# Patient Record
Sex: MALE | Race: WHITE | NOT HISPANIC OR LATINO | Employment: FULL TIME | URBAN - METROPOLITAN AREA
[De-identification: names, ages, dates, MRNs, and addresses within clinical notes are randomized per-mention and may not be internally consistent; named-entity substitution may affect disease eponyms.]

---

## 2023-05-22 ENCOUNTER — OFFICE VISIT (OUTPATIENT)
Dept: PRIMARY CARE | Facility: CLINIC | Age: 63
End: 2023-05-22
Payer: COMMERCIAL

## 2023-05-22 VITALS
TEMPERATURE: 97.9 F | WEIGHT: 315 LBS | BODY MASS INDEX: 41.75 KG/M2 | HEIGHT: 73 IN | SYSTOLIC BLOOD PRESSURE: 172 MMHG | DIASTOLIC BLOOD PRESSURE: 104 MMHG | HEART RATE: 86 BPM

## 2023-05-22 DIAGNOSIS — Z00.00 HEALTHCARE MAINTENANCE: ICD-10-CM

## 2023-05-22 DIAGNOSIS — B35.6 TINEA CRURIS: ICD-10-CM

## 2023-05-22 DIAGNOSIS — E66.01 CLASS 3 SEVERE OBESITY DUE TO EXCESS CALORIES WITH SERIOUS COMORBIDITY AND BODY MASS INDEX (BMI) OF 50.0 TO 59.9 IN ADULT (MULTI): ICD-10-CM

## 2023-05-22 DIAGNOSIS — L98.9 LESION OF LOWER EXTREMITY: ICD-10-CM

## 2023-05-22 DIAGNOSIS — E11.9 DIET-CONTROLLED DIABETES MELLITUS (MULTI): ICD-10-CM

## 2023-05-22 DIAGNOSIS — I10 PRIMARY HYPERTENSION: Primary | ICD-10-CM

## 2023-05-22 DIAGNOSIS — I83.002: ICD-10-CM

## 2023-05-22 DIAGNOSIS — G89.29 CHRONIC BILATERAL LOW BACK PAIN WITHOUT SCIATICA: ICD-10-CM

## 2023-05-22 DIAGNOSIS — L97.209: ICD-10-CM

## 2023-05-22 DIAGNOSIS — I48.11 LONGSTANDING PERSISTENT ATRIAL FIBRILLATION (MULTI): ICD-10-CM

## 2023-05-22 DIAGNOSIS — Z86.39 HISTORY OF HYPERTHYROIDISM: ICD-10-CM

## 2023-05-22 DIAGNOSIS — D50.0 ANEMIA DUE TO BLOOD LOSS: ICD-10-CM

## 2023-05-22 DIAGNOSIS — I89.0 LYMPHEDEMA: ICD-10-CM

## 2023-05-22 DIAGNOSIS — I87.2 CHRONIC VENOUS STASIS DERMATITIS OF BOTH LOWER EXTREMITIES: ICD-10-CM

## 2023-05-22 DIAGNOSIS — M54.50 CHRONIC BILATERAL LOW BACK PAIN WITHOUT SCIATICA: ICD-10-CM

## 2023-05-22 DIAGNOSIS — R53.83 OTHER FATIGUE: ICD-10-CM

## 2023-05-22 PROBLEM — I83.009 ULCER, VENOUS STASIS (MULTI): Status: ACTIVE | Noted: 2023-05-22

## 2023-05-22 PROBLEM — L97.909 ULCER, VENOUS STASIS (MULTI): Status: ACTIVE | Noted: 2023-05-22

## 2023-05-22 PROBLEM — B35.1 ONYCHOMYCOSIS OF TOENAIL: Status: ACTIVE | Noted: 2023-05-22

## 2023-05-22 PROBLEM — I48.91 ATRIAL FIBRILLATION (MULTI): Status: ACTIVE | Noted: 2023-05-22

## 2023-05-22 PROBLEM — E66.813 CLASS 3 SEVERE OBESITY DUE TO EXCESS CALORIES WITH SERIOUS COMORBIDITY AND BODY MASS INDEX (BMI) OF 50.0 TO 59.9 IN ADULT: Status: ACTIVE | Noted: 2023-05-22

## 2023-05-22 PROCEDURE — 4010F ACE/ARB THERAPY RXD/TAKEN: CPT | Performed by: FAMILY MEDICINE

## 2023-05-22 PROCEDURE — 99215 OFFICE O/P EST HI 40 MIN: CPT | Performed by: FAMILY MEDICINE

## 2023-05-22 PROCEDURE — 3080F DIAST BP >= 90 MM HG: CPT | Performed by: FAMILY MEDICINE

## 2023-05-22 PROCEDURE — 1036F TOBACCO NON-USER: CPT | Performed by: FAMILY MEDICINE

## 2023-05-22 PROCEDURE — 3077F SYST BP >= 140 MM HG: CPT | Performed by: FAMILY MEDICINE

## 2023-05-22 PROCEDURE — 3008F BODY MASS INDEX DOCD: CPT | Performed by: FAMILY MEDICINE

## 2023-05-22 PROCEDURE — 3044F HG A1C LEVEL LT 7.0%: CPT | Performed by: FAMILY MEDICINE

## 2023-05-22 RX ORDER — CHOLECALCIFEROL (VITAMIN D3) 50 MCG
1 TABLET ORAL DAILY
COMMUNITY
Start: 2019-10-04

## 2023-05-22 RX ORDER — NYSTATIN 100000 U/G
CREAM TOPICAL 2 TIMES DAILY
Qty: 60 G | Refills: 3 | Status: SHIPPED | OUTPATIENT
Start: 2023-05-22 | End: 2023-06-05

## 2023-05-22 RX ORDER — FLUCONAZOLE 100 MG/1
100 TABLET ORAL DAILY
Qty: 10 TABLET | Refills: 0 | Status: SHIPPED | OUTPATIENT
Start: 2023-05-22 | End: 2023-06-01

## 2023-05-22 RX ORDER — NYSTATIN 100000 [USP'U]/G
POWDER TOPICAL 2 TIMES DAILY
Qty: 60 G | Refills: 2 | Status: SHIPPED | OUTPATIENT
Start: 2023-05-22 | End: 2024-05-21

## 2023-05-22 RX ORDER — LISINOPRIL 20 MG/1
20 TABLET ORAL DAILY
COMMUNITY
End: 2023-05-22 | Stop reason: DRUGHIGH

## 2023-05-22 RX ORDER — FOLIC ACID 0.8 MG
1 TABLET ORAL DAILY
COMMUNITY
Start: 2019-10-04

## 2023-05-22 RX ORDER — LISINOPRIL 20 MG/1
30 TABLET ORAL DAILY
Qty: 45 TABLET | Refills: 0 | Status: SHIPPED | OUTPATIENT
Start: 2023-05-22 | End: 2023-07-17 | Stop reason: ALTCHOICE

## 2023-05-22 RX ORDER — ZINC GLUCONATE 100 MG
1 TABLET ORAL DAILY
Refills: 0
Start: 2023-05-22

## 2023-05-22 RX ORDER — IBUPROFEN 100 MG/5ML
1 SUSPENSION, ORAL (FINAL DOSE FORM) ORAL DAILY
COMMUNITY
Start: 2019-10-04

## 2023-05-22 RX ORDER — CALCIUM CARBONATE 300MG(750)
1 TABLET,CHEWABLE ORAL DAILY
COMMUNITY
Start: 2019-10-04

## 2023-05-22 RX ORDER — IODINE
CRYSTALS MISCELLANEOUS
COMMUNITY
Start: 2019-10-04

## 2023-05-22 RX ORDER — PREDNISONE 20 MG/1
TABLET ORAL
Qty: 18 TABLET | Refills: 0 | Status: ON HOLD | OUTPATIENT
Start: 2023-05-22 | End: 2023-10-13 | Stop reason: ALTCHOICE

## 2023-05-22 RX ORDER — SELENIUM 200 MCG
1 TABLET ORAL DAILY
COMMUNITY
Start: 2019-10-04

## 2023-05-22 RX ORDER — WARFARIN 1 MG/1
0.5 TABLET ORAL DAILY
COMMUNITY
Start: 2016-12-05 | End: 2023-12-07

## 2023-05-22 RX ORDER — VITAMIN E 268 MG
CAPSULE ORAL
COMMUNITY

## 2023-05-22 ASSESSMENT — ENCOUNTER SYMPTOMS
FATIGUE: 1
SHORTNESS OF BREATH: 1
WOUND: 1

## 2023-05-22 NOTE — PROGRESS NOTES
Subjective   Patient ID: Hugo Gauthier is a 62 y.o. male.    HPI  62 year old male for follow up, lesion on scalp for a week or two, not sure  Feels very tired  Meds unchanged  In wound care, ordered ultrasound  Review of Systems   Constitutional:  Positive for fatigue.   Respiratory:  Positive for shortness of breath.    Cardiovascular:  Positive for leg swelling.   Skin:  Positive for wound.       Objective   Physical Exam  Vitals reviewed.   Constitutional:       Appearance: Normal appearance.   HENT:      Head: Normocephalic and atraumatic.      Right Ear: Tympanic membrane normal.      Left Ear: Tympanic membrane normal.      Nose: Nose normal.      Mouth/Throat:      Mouth: Mucous membranes are moist.      Pharynx: Oropharynx is clear.   Eyes:      Extraocular Movements: Extraocular movements intact.      Conjunctiva/sclera: Conjunctivae normal.      Pupils: Pupils are equal, round, and reactive to light.   Cardiovascular:      Rate and Rhythm: Normal rate. Rhythm irregular.      Pulses: Normal pulses.      Heart sounds: Normal heart sounds.   Pulmonary:      Effort: Pulmonary effort is normal.      Breath sounds: Normal breath sounds.   Abdominal:      General: Abdomen is flat. Bowel sounds are normal.      Palpations: Abdomen is soft.   Musculoskeletal:         General: Normal range of motion.      Cervical back: Normal range of motion and neck supple.   Skin:     General: Skin is warm and dry.      Capillary Refill: Capillary refill takes less than 2 seconds.      Findings: Lesion present.      Comments: SCALP LESION   Neurological:      General: No focal deficit present.      Mental Status: He is alert and oriented to person, place, and time.   Psychiatric:         Mood and Affect: Mood normal.         Behavior: Behavior normal.         Assessment/Plan   Diagnoses and all orders for this visit:  Primary hypertension  Healthcare maintenance  -     zinc gluconate 100 mg tablet; Take 1 tablet (100 mg) by  mouth once daily.  Longstanding persistent atrial fibrillation (CMS/[facility])  -     POCT INR manually resulted  Chronic venous stasis dermatitis of both lower extremities  Lesion of lower extremity  Anemia due to blood loss  -     CBC and Auto Differential; Future  History of hyperthyroidism  -     Comprehensive Metabolic Panel; Future  Lymphedema  Venous stasis ulcer of calf with varicose veins, unspecified laterality, unspecified ulcer stage (CMS/[facility])  Class 3 severe obesity due to excess calories with serious comorbidity and body mass index (BMI) of 50.0 to 59.9 in adult (CMS/[facility])  Diet-controlled diabetes mellitus (CMS/[facility])  -     Hemoglobin A1C; Future  Other fatigue  -     Testosterone; Future  Chronic bilateral low back pain without sciatica  Tinea cruris  -     fluconazole (Diflucan) 100 mg tablet; Take 1 tablet (100 mg) by mouth once daily for 10 days.  -     nystatin (Mycostatin) 100,000 unit/gram powder; Apply topically 2 times a day.  -     nystatin (Mycostatin) cream; Apply topically 2 times a day for 14 days. apply to affected area  -     predniSONE (Deltasone) 20 mg tablet; Take 3 tabs (60mg) daily for 3 days, then take 2 tabs (40mg) daily for 3 days, then take 1 tab (20mg) daily for 3 days.  Other orders  -     lisinopril 20 mg tablet; Take 1.5 tablets (30 mg) by mouth once daily.

## 2023-05-22 NOTE — PATIENT INSTRUCTIONS
Diagnoses and all orders for this visit:  Primary hypertension  Healthcare maintenance  -     zinc gluconate 100 mg tablet; Take 1 tablet (100 mg) by mouth once daily.  Longstanding persistent atrial fibrillation (CMS/HCC)  -     POCT INR manually resulted  Chronic venous stasis dermatitis of both lower extremities  Lesion of lower extremity  Anemia due to blood loss  -     CBC and Auto Differential; Future  History of hyperthyroidism  -     Comprehensive Metabolic Panel; Future  Lymphedema  Venous stasis ulcer of calf with varicose veins, unspecified laterality, unspecified ulcer stage (CMS/[facility])  Class 3 severe obesity due to excess calories with serious comorbidity and body mass index (BMI) of 50.0 to 59.9 in adult (CMS/[facility])- stable.   Diet-controlled diabetes mellitus (CMS/[facility])  -     Hemoglobin A1C; Future (last 6.0)   Other fatigue  -     Testosterone; Future with next bloodwork  Chronic bilateral low back pain without sciatica- may have to go back to [facility] at some point.   Other orders  -     lisinopril 20 mg tablet; Take 1.5 tablets (30 mg) by mouth once daily.    Tinea cruris- will try oral steroid (which will also help scalp lesion) , diflucan, nystatin creams and powders.     Follow up six months. Blood work this summer. Recheck blood pressure with next INR.

## 2023-06-27 LAB
ALANINE AMINOTRANSFERASE (SGPT) (U/L) IN SER/PLAS: 31 U/L (ref 10–52)
ALBUMIN (G/DL) IN SER/PLAS: 4 G/DL (ref 3.4–5)
ALKALINE PHOSPHATASE (U/L) IN SER/PLAS: 44 U/L (ref 33–136)
ANION GAP IN SER/PLAS: 10 MMOL/L (ref 10–20)
ASPARTATE AMINOTRANSFERASE (SGOT) (U/L) IN SER/PLAS: 23 U/L (ref 9–39)
BILIRUBIN TOTAL (MG/DL) IN SER/PLAS: 0.5 MG/DL (ref 0–1.2)
CALCIUM (MG/DL) IN SER/PLAS: 9.3 MG/DL (ref 8.6–10.3)
CARBON DIOXIDE, TOTAL (MMOL/L) IN SER/PLAS: 29 MMOL/L (ref 21–32)
CHLORIDE (MMOL/L) IN SER/PLAS: 103 MMOL/L (ref 98–107)
CREATININE (MG/DL) IN SER/PLAS: 0.76 MG/DL (ref 0.5–1.3)
ERYTHROCYTE DISTRIBUTION WIDTH (RATIO) BY AUTOMATED COUNT: 18.5 % (ref 11.5–14.5)
ERYTHROCYTE MEAN CORPUSCULAR HEMOGLOBIN CONCENTRATION (G/DL) BY AUTOMATED: 30 G/DL (ref 32–36)
ERYTHROCYTE MEAN CORPUSCULAR VOLUME (FL) BY AUTOMATED COUNT: 92 FL (ref 80–100)
ERYTHROCYTES (10*6/UL) IN BLOOD BY AUTOMATED COUNT: 4.51 X10E12/L (ref 4.5–5.9)
GFR MALE: >90 ML/MIN/1.73M2
GLUCOSE (MG/DL) IN SER/PLAS: 124 MG/DL (ref 74–99)
HEMATOCRIT (%) IN BLOOD BY AUTOMATED COUNT: 41.7 % (ref 41–52)
HEMOGLOBIN (G/DL) IN BLOOD: 12.5 G/DL (ref 13.5–17.5)
LEUKOCYTES (10*3/UL) IN BLOOD BY AUTOMATED COUNT: 5.1 X10E9/L (ref 4.4–11.3)
PLATELETS (10*3/UL) IN BLOOD AUTOMATED COUNT: 219 X10E9/L (ref 150–450)
POTASSIUM (MMOL/L) IN SER/PLAS: 4.1 MMOL/L (ref 3.5–5.3)
PROTEIN TOTAL: 7.3 G/DL (ref 6.4–8.2)
SODIUM (MMOL/L) IN SER/PLAS: 138 MMOL/L (ref 136–145)
UREA NITROGEN (MG/DL) IN SER/PLAS: 12 MG/DL (ref 6–23)

## 2023-07-17 ENCOUNTER — CLINICAL SUPPORT (OUTPATIENT)
Dept: PRIMARY CARE | Facility: CLINIC | Age: 63
End: 2023-07-17
Payer: COMMERCIAL

## 2023-07-17 ENCOUNTER — TELEPHONE (OUTPATIENT)
Dept: PRIMARY CARE | Facility: CLINIC | Age: 63
End: 2023-07-17

## 2023-07-17 DIAGNOSIS — I10 PRIMARY HYPERTENSION: Primary | ICD-10-CM

## 2023-07-17 DIAGNOSIS — I48.11 LONGSTANDING PERSISTENT ATRIAL FIBRILLATION (MULTI): ICD-10-CM

## 2023-07-17 LAB — POC INR: 1.5 (ref 0.9–1.1)

## 2023-07-17 PROCEDURE — 85610 PROTHROMBIN TIME: CPT | Performed by: FAMILY MEDICINE

## 2023-07-17 RX ORDER — DILTIAZEM HYDROCHLORIDE 60 MG/1
60 TABLET, FILM COATED ORAL DAILY
Qty: 90 TABLET | Refills: 3 | Status: SHIPPED | OUTPATIENT
Start: 2023-07-17 | End: 2024-01-22 | Stop reason: WASHOUT

## 2023-07-17 RX ORDER — LISINOPRIL 30 MG/1
30 TABLET ORAL DAILY
Qty: 90 TABLET | Refills: 3 | Status: SHIPPED | OUTPATIENT
Start: 2023-07-17 | End: 2023-09-27

## 2023-07-17 RX ORDER — DILTIAZEM HYDROCHLORIDE 60 MG/1
60 TABLET, FILM COATED ORAL DAILY
Qty: 90 TABLET | Refills: 3 | Status: SHIPPED | OUTPATIENT
Start: 2023-07-17 | End: 2023-07-17 | Stop reason: SDUPTHER

## 2023-07-17 RX ORDER — DILTIAZEM HYDROCHLORIDE 60 MG/1
60 TABLET, FILM COATED ORAL DAILY
COMMUNITY
End: 2023-07-17 | Stop reason: SDUPTHER

## 2023-07-17 RX ORDER — LISINOPRIL 30 MG/1
30 TABLET ORAL DAILY
Qty: 90 TABLET | Refills: 3 | Status: SHIPPED | OUTPATIENT
Start: 2023-07-17 | End: 2023-07-17 | Stop reason: SDUPTHER

## 2023-07-17 NOTE — TELEPHONE ENCOUNTER
Patient needs refills on Warfairin 1mg -Family           Needs 2 medications sent to [facility]     Lisinopril 30mg or 20mg 1.5    Diltiazem 60mg

## 2023-07-17 NOTE — PROGRESS NOTES
Patient presented today for INR check. INR was 1.5  patient will take 4.5mg daily.  Repeat X1 month.    Leidy Charles MA II

## 2023-08-01 LAB
ALBUMIN (G/DL) IN SER/PLAS: 4.1 G/DL (ref 3.4–5)
ANION GAP IN SER/PLAS: 11 MMOL/L (ref 10–20)
CALCIUM (MG/DL) IN SER/PLAS: 9.3 MG/DL (ref 8.6–10.3)
CARBON DIOXIDE, TOTAL (MMOL/L) IN SER/PLAS: 29 MMOL/L (ref 21–32)
CHLORIDE (MMOL/L) IN SER/PLAS: 104 MMOL/L (ref 98–107)
CREATININE (MG/DL) IN SER/PLAS: 0.86 MG/DL (ref 0.5–1.3)
GFR MALE: >90 ML/MIN/1.73M2
GLUCOSE (MG/DL) IN SER/PLAS: 99 MG/DL (ref 74–99)
PHOSPHATE (MG/DL) IN SER/PLAS: 3.5 MG/DL (ref 2.5–4.9)
POTASSIUM (MMOL/L) IN SER/PLAS: 4.3 MMOL/L (ref 3.5–5.3)
SODIUM (MMOL/L) IN SER/PLAS: 140 MMOL/L (ref 136–145)
UREA NITROGEN (MG/DL) IN SER/PLAS: 14 MG/DL (ref 6–23)

## 2023-08-21 ENCOUNTER — LAB (OUTPATIENT)
Dept: LAB | Facility: LAB | Age: 63
End: 2023-08-21
Payer: COMMERCIAL

## 2023-08-21 DIAGNOSIS — R53.83 OTHER FATIGUE: ICD-10-CM

## 2023-08-21 DIAGNOSIS — E11.9 DIET-CONTROLLED DIABETES MELLITUS (MULTI): ICD-10-CM

## 2023-08-21 DIAGNOSIS — D50.0 ANEMIA DUE TO BLOOD LOSS: ICD-10-CM

## 2023-08-21 DIAGNOSIS — Z86.39 HISTORY OF HYPERTHYROIDISM: ICD-10-CM

## 2023-08-21 LAB
ALANINE AMINOTRANSFERASE (SGPT) (U/L) IN SER/PLAS: 28 U/L (ref 10–52)
ALBUMIN (G/DL) IN SER/PLAS: 4 G/DL (ref 3.4–5)
ALKALINE PHOSPHATASE (U/L) IN SER/PLAS: 43 U/L (ref 33–136)
ANION GAP IN SER/PLAS: 12 MMOL/L (ref 10–20)
ASPARTATE AMINOTRANSFERASE (SGOT) (U/L) IN SER/PLAS: 26 U/L (ref 9–39)
BASOPHILS (10*3/UL) IN BLOOD BY AUTOMATED COUNT: 0.04 X10E9/L (ref 0–0.1)
BASOPHILS/100 LEUKOCYTES IN BLOOD BY AUTOMATED COUNT: 0.6 % (ref 0–2)
BILIRUBIN TOTAL (MG/DL) IN SER/PLAS: 0.5 MG/DL (ref 0–1.2)
CALCIUM (MG/DL) IN SER/PLAS: 9.3 MG/DL (ref 8.6–10.3)
CARBON DIOXIDE, TOTAL (MMOL/L) IN SER/PLAS: 27 MMOL/L (ref 21–32)
CHLORIDE (MMOL/L) IN SER/PLAS: 104 MMOL/L (ref 98–107)
CREATININE (MG/DL) IN SER/PLAS: 0.75 MG/DL (ref 0.5–1.3)
EOSINOPHILS (10*3/UL) IN BLOOD BY AUTOMATED COUNT: 0.26 X10E9/L (ref 0–0.7)
EOSINOPHILS/100 LEUKOCYTES IN BLOOD BY AUTOMATED COUNT: 4.2 % (ref 0–6)
ERYTHROCYTE DISTRIBUTION WIDTH (RATIO) BY AUTOMATED COUNT: 17.2 % (ref 11.5–14.5)
ERYTHROCYTE MEAN CORPUSCULAR HEMOGLOBIN CONCENTRATION (G/DL) BY AUTOMATED: 29.6 G/DL (ref 32–36)
ERYTHROCYTE MEAN CORPUSCULAR VOLUME (FL) BY AUTOMATED COUNT: 94 FL (ref 80–100)
ERYTHROCYTES (10*6/UL) IN BLOOD BY AUTOMATED COUNT: 4.42 X10E12/L (ref 4.5–5.9)
ESTIMATED AVERAGE GLUCOSE FOR HBA1C: 131 MG/DL
GFR MALE: >90 ML/MIN/1.73M2
GLUCOSE (MG/DL) IN SER/PLAS: 116 MG/DL (ref 74–99)
HEMATOCRIT (%) IN BLOOD BY AUTOMATED COUNT: 41.5 % (ref 41–52)
HEMOGLOBIN (G/DL) IN BLOOD: 12.3 G/DL (ref 13.5–17.5)
HEMOGLOBIN A1C/HEMOGLOBIN TOTAL IN BLOOD: 6.2 %
IMMATURE GRANULOCYTES/100 LEUKOCYTES IN BLOOD BY AUTOMATED COUNT: 0.2 % (ref 0–0.9)
LEUKOCYTES (10*3/UL) IN BLOOD BY AUTOMATED COUNT: 6.2 X10E9/L (ref 4.4–11.3)
LYMPHOCYTES (10*3/UL) IN BLOOD BY AUTOMATED COUNT: 1.35 X10E9/L (ref 1.2–4.8)
LYMPHOCYTES/100 LEUKOCYTES IN BLOOD BY AUTOMATED COUNT: 21.8 % (ref 13–44)
MONOCYTES (10*3/UL) IN BLOOD BY AUTOMATED COUNT: 0.73 X10E9/L (ref 0.1–1)
MONOCYTES/100 LEUKOCYTES IN BLOOD BY AUTOMATED COUNT: 11.8 % (ref 2–10)
NEUTROPHILS (10*3/UL) IN BLOOD BY AUTOMATED COUNT: 3.79 X10E9/L (ref 1.2–7.7)
NEUTROPHILS/100 LEUKOCYTES IN BLOOD BY AUTOMATED COUNT: 61.4 % (ref 40–80)
PLATELETS (10*3/UL) IN BLOOD AUTOMATED COUNT: 217 X10E9/L (ref 150–450)
POTASSIUM (MMOL/L) IN SER/PLAS: 4.6 MMOL/L (ref 3.5–5.3)
PROTEIN TOTAL: 6.9 G/DL (ref 6.4–8.2)
SODIUM (MMOL/L) IN SER/PLAS: 138 MMOL/L (ref 136–145)
TESTOSTERONE (NG/DL) IN SER/PLAS: 96 NG/DL (ref 240–1000)
UREA NITROGEN (MG/DL) IN SER/PLAS: 16 MG/DL (ref 6–23)

## 2023-08-21 PROCEDURE — 80053 COMPREHEN METABOLIC PANEL: CPT

## 2023-08-21 PROCEDURE — 36415 COLL VENOUS BLD VENIPUNCTURE: CPT

## 2023-08-21 PROCEDURE — 83036 HEMOGLOBIN GLYCOSYLATED A1C: CPT

## 2023-08-21 PROCEDURE — 84403 ASSAY OF TOTAL TESTOSTERONE: CPT

## 2023-08-21 PROCEDURE — 85025 COMPLETE CBC W/AUTO DIFF WBC: CPT

## 2023-08-29 ENCOUNTER — TELEPHONE (OUTPATIENT)
Dept: PRIMARY CARE | Facility: CLINIC | Age: 63
End: 2023-08-29
Payer: COMMERCIAL

## 2023-08-29 DIAGNOSIS — M54.50 CHRONIC BILATERAL LOW BACK PAIN WITHOUT SCIATICA: Primary | ICD-10-CM

## 2023-08-29 DIAGNOSIS — G89.29 CHRONIC BILATERAL LOW BACK PAIN WITHOUT SCIATICA: Primary | ICD-10-CM

## 2023-08-29 DIAGNOSIS — M47.817 SPONDYLOSIS OF LUMBOSACRAL REGION WITHOUT MYELOPATHY OR RADICULOPATHY: ICD-10-CM

## 2023-08-29 PROBLEM — E29.1 HYPOGONADISM MALE: Status: ACTIVE | Noted: 2023-08-29

## 2023-08-29 NOTE — RESULT ENCOUNTER NOTE
Labs looked pretty good except A1C was 6.2 (still pretty good) Otherwise not too anemic, and testosterone was VERY low- you would be a candidate for supplementation- either with shots or patches or gel. Check you insurance to see what is on your formulary.

## 2023-08-29 NOTE — TELEPHONE ENCOUNTER
Would like to know if you can help him with FMLA papers and short term disability paperwork   Needs referral for lower back pain, Would like DR. Landers with [facility] if he is in network  Also needs a second blood test for testosterone

## 2023-09-05 ENCOUNTER — LAB (OUTPATIENT)
Dept: LAB | Facility: LAB | Age: 63
End: 2023-09-05
Payer: COMMERCIAL

## 2023-09-05 ENCOUNTER — OFFICE VISIT (OUTPATIENT)
Dept: PRIMARY CARE | Facility: CLINIC | Age: 63
End: 2023-09-05
Payer: COMMERCIAL

## 2023-09-05 VITALS
WEIGHT: 315 LBS | HEIGHT: 71 IN | BODY MASS INDEX: 44.1 KG/M2 | DIASTOLIC BLOOD PRESSURE: 84 MMHG | HEART RATE: 80 BPM | SYSTOLIC BLOOD PRESSURE: 152 MMHG | TEMPERATURE: 97.8 F | RESPIRATION RATE: 18 BRPM | OXYGEN SATURATION: 95 %

## 2023-09-05 DIAGNOSIS — M54.50 CHRONIC BILATERAL LOW BACK PAIN WITHOUT SCIATICA: ICD-10-CM

## 2023-09-05 DIAGNOSIS — I48.11 LONGSTANDING PERSISTENT ATRIAL FIBRILLATION (MULTI): ICD-10-CM

## 2023-09-05 DIAGNOSIS — G89.29 CHRONIC BILATERAL LOW BACK PAIN WITHOUT SCIATICA: ICD-10-CM

## 2023-09-05 DIAGNOSIS — L57.0 ACTINIC KERATOSES: Primary | ICD-10-CM

## 2023-09-05 DIAGNOSIS — M47.817 SPONDYLOSIS OF LUMBOSACRAL REGION WITHOUT MYELOPATHY OR RADICULOPATHY: ICD-10-CM

## 2023-09-05 LAB
POC INR: 4 (ref 0.9–1.1)
TESTOSTERONE (NG/DL) IN SER/PLAS: 75 NG/DL (ref 240–1000)

## 2023-09-05 PROCEDURE — 3079F DIAST BP 80-89 MM HG: CPT | Performed by: FAMILY MEDICINE

## 2023-09-05 PROCEDURE — 3008F BODY MASS INDEX DOCD: CPT | Performed by: FAMILY MEDICINE

## 2023-09-05 PROCEDURE — 99215 OFFICE O/P EST HI 40 MIN: CPT | Performed by: FAMILY MEDICINE

## 2023-09-05 PROCEDURE — 1036F TOBACCO NON-USER: CPT | Performed by: FAMILY MEDICINE

## 2023-09-05 PROCEDURE — 84403 ASSAY OF TOTAL TESTOSTERONE: CPT

## 2023-09-05 PROCEDURE — 85610 PROTHROMBIN TIME: CPT | Performed by: FAMILY MEDICINE

## 2023-09-05 PROCEDURE — 4010F ACE/ARB THERAPY RXD/TAKEN: CPT | Performed by: FAMILY MEDICINE

## 2023-09-05 PROCEDURE — 3044F HG A1C LEVEL LT 7.0%: CPT | Performed by: FAMILY MEDICINE

## 2023-09-05 PROCEDURE — 17110 DESTRUCTION B9 LES UP TO 14: CPT | Performed by: FAMILY MEDICINE

## 2023-09-05 PROCEDURE — 3077F SYST BP >= 140 MM HG: CPT | Performed by: FAMILY MEDICINE

## 2023-09-05 PROCEDURE — 36415 COLL VENOUS BLD VENIPUNCTURE: CPT

## 2023-09-05 ASSESSMENT — ENCOUNTER SYMPTOMS
SHORTNESS OF BREATH: 1
ROS SKIN COMMENTS: SEE HPI
FATIGUE: 1
PALPITATIONS: 0

## 2023-09-05 ASSESSMENT — PATIENT HEALTH QUESTIONNAIRE - PHQ9
SUM OF ALL RESPONSES TO PHQ9 QUESTIONS 1 AND 2: 0
1. LITTLE INTEREST OR PLEASURE IN DOING THINGS: NOT AT ALL
2. FEELING DOWN, DEPRESSED OR HOPELESS: NOT AT ALL

## 2023-09-05 ASSESSMENT — LIFESTYLE VARIABLES
SKIP TO QUESTIONS 9-10: 1
AUDIT-C TOTAL SCORE: 0
HOW OFTEN DO YOU HAVE SIX OR MORE DRINKS ON ONE OCCASION: NEVER
HOW MANY STANDARD DRINKS CONTAINING ALCOHOL DO YOU HAVE ON A TYPICAL DAY: PATIENT DOES NOT DRINK
HOW OFTEN DO YOU HAVE A DRINK CONTAINING ALCOHOL: NEVER

## 2023-09-05 NOTE — PROGRESS NOTES
"Subjective   Patient ID: Hugo Gauthier is a 62 y.o. male who presents for paperwork (FMLA).  Patient ID: Hugo Gauthier is a 62 y.o. male.    Destruction of lesion    Date/Time: 9/5/2023 12:48 PM    Performed by: SHERLEY Fox  Authorized by: SHERLEY Fox    Number of Lesions: 1  Lesion 1:     Body area: upper extremity    Upper extremity location: left shoulder.    Malignancy: benign lesion      Destruction method: cryotherapy        Presents for FMLA paperwork has been off work since 8/29 due to multiple health issues and is currently under the care of several specialists and is undergoing testing for potential surgery, due to this it is difficult for him to attend work, would benefit from a continuous period off to be able to attend appointments and testing, is following with vascular for peripheral vascular disease, cardiology, for shortness of breath and fatigue with pending cardiovascular testing and infectious disease for leg wounds.     Wound of left leg for 2 years, following with wound care for this monthly and was a recommended to see the podiatrist     Also wishes to have skin growth removed today               Review of Systems   Constitutional:  Positive for fatigue.   Respiratory:  Positive for shortness of breath.    Cardiovascular:  Negative for chest pain and palpitations.   Skin:         See HPI   All other systems reviewed and are negative.      Objective   /84   Pulse 80   Temp 36.6 °C (97.8 °F) (Temporal)   Resp 18   Ht 1.803 m (5' 11\")   Wt (!) 169 kg (371 lb 12.8 oz)   SpO2 95%   BMI 51.86 kg/m²     Physical Exam  Constitutional:       Appearance: Normal appearance.   HENT:      Head: Normocephalic and atraumatic.   Cardiovascular:      Rate and Rhythm: Normal rate and regular rhythm.      Heart sounds: No murmur heard.     No gallop.   Pulmonary:      Effort: Pulmonary effort is normal. No respiratory distress.      Breath sounds: Normal " breath sounds.   Abdominal:      General: Bowel sounds are normal. There is no distension.      Tenderness: There is no abdominal tenderness.   Musculoskeletal:         General: Normal range of motion.   Skin:     General: Skin is warm and dry.      Findings: No lesion or rash.      Comments: Left shoulder with 4mm in diameter actinic keratosis    Neurological:      General: No focal deficit present.      Mental Status: He is alert and oriented to person, place, and time. Mental status is at baseline.   Psychiatric:         Mood and Affect: Mood normal.         Behavior: Behavior normal.         Assessment/Plan   Problem List Items Addressed This Visit    None  Visit Diagnoses       Actinic keratoses    -  Primary    Relevant Orders    Cryotherapy, skin lesion

## 2023-09-11 ENCOUNTER — TELEPHONE (OUTPATIENT)
Dept: PRIMARY CARE | Facility: CLINIC | Age: 63
End: 2023-09-11
Payer: COMMERCIAL

## 2023-09-11 NOTE — TELEPHONE ENCOUNTER
Testosterone shots  [facility]  [facility]   [phone]  Fax [phone]

## 2023-09-12 ENCOUNTER — TELEPHONE (OUTPATIENT)
Dept: PRIMARY CARE | Facility: CLINIC | Age: 63
End: 2023-09-12
Payer: COMMERCIAL

## 2023-09-12 DIAGNOSIS — E29.1 HYPOGONADISM MALE: Primary | ICD-10-CM

## 2023-09-12 RX ORDER — TESTOSTERONE CYPIONATE 1000 MG/10ML
100 INJECTION, SOLUTION INTRAMUSCULAR
Qty: 2 ML | Refills: 5 | Status: SHIPPED | OUTPATIENT
Start: 2023-09-12 | End: 2023-09-13 | Stop reason: SDUPTHER

## 2023-09-12 RX ORDER — SYRINGE WITH NEEDLE, 1 ML 25GX5/8"
1 SYRINGE, EMPTY DISPOSABLE MISCELLANEOUS
Qty: 2 EACH | Refills: 5 | Status: SHIPPED | OUTPATIENT
Start: 2023-09-12 | End: 2024-03-06

## 2023-09-13 DIAGNOSIS — E29.1 HYPOGONADISM MALE: ICD-10-CM

## 2023-09-13 RX ORDER — TESTOSTERONE CYPIONATE 1000 MG/10ML
200 INJECTION, SOLUTION INTRAMUSCULAR
Qty: 2 ML | Refills: 0 | Status: SHIPPED | OUTPATIENT
Start: 2023-09-13 | End: 2023-09-27 | Stop reason: ENTERED-IN-ERROR

## 2023-09-18 ENCOUNTER — TELEPHONE (OUTPATIENT)
Dept: PRIMARY CARE | Facility: CLINIC | Age: 63
End: 2023-09-18
Payer: COMMERCIAL

## 2023-09-18 DIAGNOSIS — I48.11 LONGSTANDING PERSISTENT ATRIAL FIBRILLATION (MULTI): ICD-10-CM

## 2023-09-19 RX ORDER — WARFARIN 4 MG/1
TABLET ORAL
Qty: 30 TABLET | Refills: 0 | Status: SHIPPED | OUTPATIENT
Start: 2023-09-19 | End: 2023-10-24

## 2023-09-21 ENCOUNTER — TELEPHONE (OUTPATIENT)
Dept: PRIMARY CARE | Facility: CLINIC | Age: 63
End: 2023-09-21
Payer: COMMERCIAL

## 2023-09-21 DIAGNOSIS — L97.209: ICD-10-CM

## 2023-09-21 DIAGNOSIS — M54.50 CHRONIC BILATERAL LOW BACK PAIN WITHOUT SCIATICA: ICD-10-CM

## 2023-09-21 DIAGNOSIS — I87.2 CHRONIC VENOUS STASIS DERMATITIS OF BOTH LOWER EXTREMITIES: Primary | ICD-10-CM

## 2023-09-21 DIAGNOSIS — G89.29 CHRONIC BILATERAL LOW BACK PAIN WITHOUT SCIATICA: ICD-10-CM

## 2023-09-21 DIAGNOSIS — I83.002: ICD-10-CM

## 2023-09-27 ENCOUNTER — TELEPHONE (OUTPATIENT)
Dept: PRIMARY CARE | Facility: CLINIC | Age: 63
End: 2023-09-27
Payer: COMMERCIAL

## 2023-09-27 DIAGNOSIS — E29.1 HYPOGONADISM MALE: ICD-10-CM

## 2023-09-27 RX ORDER — TESTOSTERONE CYPIONATE 1000 MG/10ML
200 INJECTION, SOLUTION INTRAMUSCULAR
Qty: 4 ML | Refills: 5 | Status: CANCELLED | OUTPATIENT
Start: 2023-09-27

## 2023-09-27 RX ORDER — LISINOPRIL 40 MG/1
40 TABLET ORAL DAILY
COMMUNITY
Start: 2023-08-21 | End: 2024-02-22 | Stop reason: SDUPTHER

## 2023-09-27 RX ORDER — TESTOSTERONE CYPIONATE 200 MG/ML
200 INJECTION, SOLUTION INTRAMUSCULAR
Qty: 2 ML | Refills: 5 | Status: SHIPPED | OUTPATIENT
Start: 2023-09-27 | End: 2024-03-06

## 2023-09-29 ENCOUNTER — APPOINTMENT (OUTPATIENT)
Dept: PRIMARY CARE | Facility: CLINIC | Age: 63
End: 2023-09-29
Payer: COMMERCIAL

## 2023-10-01 PROBLEM — R40.0 DAYTIME SOMNOLENCE: Status: ACTIVE | Noted: 2023-10-01

## 2023-10-01 PROBLEM — R06.02 SOBOE (SHORTNESS OF BREATH ON EXERTION): Status: ACTIVE | Noted: 2023-10-01

## 2023-10-01 PROBLEM — E66.01 MORBID OBESITY (MULTI): Status: ACTIVE | Noted: 2023-10-01

## 2023-10-01 PROBLEM — R53.82 CHRONIC FATIGUE: Status: ACTIVE | Noted: 2023-10-01

## 2023-10-02 PROBLEM — B35.6 TINEA CRURIS: Status: ACTIVE | Noted: 2023-10-02

## 2023-10-02 PROBLEM — D22.4 NEVUS OF SCALP: Status: ACTIVE | Noted: 2023-10-02

## 2023-10-02 PROBLEM — R31.9 BLOOD IN URINE: Status: ACTIVE | Noted: 2023-10-02

## 2023-10-02 PROBLEM — L85.8 CUTANEOUS HORN: Status: ACTIVE | Noted: 2023-10-02

## 2023-10-02 PROBLEM — L57.0 ACTINIC KERATOSIS: Status: ACTIVE | Noted: 2023-10-02

## 2023-10-02 PROBLEM — Z86.2 HISTORY OF IRON DEFICIENCY ANEMIA: Status: ACTIVE | Noted: 2023-10-02

## 2023-10-02 PROBLEM — Z79.01 ANTICOAGULATED BY ANTICOAGULATION TREATMENT: Status: ACTIVE | Noted: 2023-10-02

## 2023-10-02 PROBLEM — F41.8 TEST ANXIETY: Status: ACTIVE | Noted: 2023-10-02

## 2023-10-02 PROBLEM — L23.7 POISON IVY DERMATITIS: Status: ACTIVE | Noted: 2023-10-02

## 2023-10-02 PROBLEM — I87.1 MAY-THURNER SYNDROME: Status: ACTIVE | Noted: 2023-10-02

## 2023-10-02 PROBLEM — R30.0 DYSURIA: Status: ACTIVE | Noted: 2023-10-02

## 2023-10-02 PROBLEM — R35.0 URINARY FREQUENCY: Status: ACTIVE | Noted: 2023-10-02

## 2023-10-02 PROBLEM — R94.39 ABNORMAL NUCLEAR STRESS TEST: Status: ACTIVE | Noted: 2023-10-02

## 2023-10-02 RX ORDER — KETOCONAZOLE 20 MG/G
CREAM TOPICAL
COMMUNITY
Start: 2023-05-11 | End: 2023-12-07

## 2023-10-02 RX ORDER — KETOCONAZOLE 200 MG/1
TABLET ORAL
Status: ON HOLD | COMMUNITY
Start: 2023-02-27 | End: 2023-10-13 | Stop reason: ALTCHOICE

## 2023-10-02 RX ORDER — CEFDINIR 300 MG/1
300 CAPSULE ORAL DAILY
Status: ON HOLD | COMMUNITY
End: 2023-10-13 | Stop reason: ALTCHOICE

## 2023-10-04 ENCOUNTER — APPOINTMENT (OUTPATIENT)
Dept: CARDIOLOGY | Facility: CLINIC | Age: 63
End: 2023-10-04
Payer: COMMERCIAL

## 2023-10-04 ENCOUNTER — LAB (OUTPATIENT)
Dept: LAB | Facility: LAB | Age: 63
End: 2023-10-04
Payer: COMMERCIAL

## 2023-10-04 DIAGNOSIS — R94.39 ABNORMAL RESULT OF OTHER CARDIOVASCULAR FUNCTION STUDY: ICD-10-CM

## 2023-10-04 DIAGNOSIS — R94.39 ABNORMAL RESULT OF OTHER CARDIOVASCULAR FUNCTION STUDY: Primary | ICD-10-CM

## 2023-10-04 LAB
ANION GAP SERPL CALC-SCNC: 13 MMOL/L (ref 10–20)
BUN SERPL-MCNC: 12 MG/DL (ref 6–23)
CALCIUM SERPL-MCNC: 9.6 MG/DL (ref 8.6–10.3)
CHLORIDE SERPL-SCNC: 102 MMOL/L (ref 98–107)
CO2 SERPL-SCNC: 26 MMOL/L (ref 21–32)
CREAT SERPL-MCNC: 0.76 MG/DL (ref 0.5–1.3)
ERYTHROCYTE [DISTWIDTH] IN BLOOD BY AUTOMATED COUNT: 16.4 % (ref 11.5–14.5)
GFR SERPL CREATININE-BSD FRML MDRD: >90 ML/MIN/1.73M*2
GLUCOSE SERPL-MCNC: 119 MG/DL (ref 74–99)
HCT VFR BLD AUTO: 39.4 % (ref 41–52)
HGB BLD-MCNC: 12.1 G/DL (ref 13.5–17.5)
MCH RBC QN AUTO: 28.4 PG (ref 26–34)
MCHC RBC AUTO-ENTMCNC: 30.7 G/DL (ref 32–36)
MCV RBC AUTO: 93 FL (ref 80–100)
NRBC BLD-RTO: 0 /100 WBCS (ref 0–0)
PLATELET # BLD AUTO: 225 X10*3/UL (ref 150–450)
PMV BLD AUTO: 9.6 FL (ref 7.5–11.5)
POTASSIUM SERPL-SCNC: 4.4 MMOL/L (ref 3.5–5.3)
RBC # BLD AUTO: 4.26 X10*6/UL (ref 4.5–5.9)
SODIUM SERPL-SCNC: 137 MMOL/L (ref 136–145)
WBC # BLD AUTO: 5.6 X10*3/UL (ref 4.4–11.3)

## 2023-10-04 PROCEDURE — 36415 COLL VENOUS BLD VENIPUNCTURE: CPT

## 2023-10-11 NOTE — TELEPHONE ENCOUNTER
10/11/23  1035  Patient called in to report he had his sleep study, labs done for heart cath and informed he has stopped his Warfarin in preparation for heart cath.    Patient to follow up with Dr. Medeiros on 10/20/23

## 2023-10-13 ENCOUNTER — HOSPITAL ENCOUNTER (OUTPATIENT)
Facility: HOSPITAL | Age: 63
Setting detail: OUTPATIENT SURGERY
Discharge: HOME | End: 2023-10-13
Attending: STUDENT IN AN ORGANIZED HEALTH CARE EDUCATION/TRAINING PROGRAM | Admitting: STUDENT IN AN ORGANIZED HEALTH CARE EDUCATION/TRAINING PROGRAM
Payer: COMMERCIAL

## 2023-10-13 VITALS
DIASTOLIC BLOOD PRESSURE: 91 MMHG | OXYGEN SATURATION: 98 % | TEMPERATURE: 97 F | HEART RATE: 95 BPM | SYSTOLIC BLOOD PRESSURE: 177 MMHG | RESPIRATION RATE: 16 BRPM

## 2023-10-13 DIAGNOSIS — I20.9 ANGINA PECTORIS, UNSPECIFIED (CMS-HCC): ICD-10-CM

## 2023-10-13 LAB
POCT INTERNATIONAL NORMALIZATION RATIO: 1.8
POCT PROTHROMBIN TIME: 22 SECONDS

## 2023-10-13 PROCEDURE — 2500000001 HC RX 250 WO HCPCS SELF ADMINISTERED DRUGS (ALT 637 FOR MEDICARE OP): Performed by: STUDENT IN AN ORGANIZED HEALTH CARE EDUCATION/TRAINING PROGRAM

## 2023-10-13 PROCEDURE — 7100000009 HC PHASE TWO TIME - INITIAL BASE CHARGE: Performed by: STUDENT IN AN ORGANIZED HEALTH CARE EDUCATION/TRAINING PROGRAM

## 2023-10-13 PROCEDURE — 2500000005 HC RX 250 GENERAL PHARMACY W/O HCPCS: Performed by: STUDENT IN AN ORGANIZED HEALTH CARE EDUCATION/TRAINING PROGRAM

## 2023-10-13 PROCEDURE — 2500000004 HC RX 250 GENERAL PHARMACY W/ HCPCS (ALT 636 FOR OP/ED): Performed by: STUDENT IN AN ORGANIZED HEALTH CARE EDUCATION/TRAINING PROGRAM

## 2023-10-13 PROCEDURE — 2720000007 HC OR 272 NO HCPCS: Performed by: STUDENT IN AN ORGANIZED HEALTH CARE EDUCATION/TRAINING PROGRAM

## 2023-10-13 PROCEDURE — 7100000010 HC PHASE TWO TIME - EACH INCREMENTAL 1 MINUTE: Performed by: STUDENT IN AN ORGANIZED HEALTH CARE EDUCATION/TRAINING PROGRAM

## 2023-10-13 PROCEDURE — 99221 1ST HOSP IP/OBS SF/LOW 40: CPT | Performed by: NURSE PRACTITIONER

## 2023-10-13 PROCEDURE — 2500000004 HC RX 250 GENERAL PHARMACY W/ HCPCS (ALT 636 FOR OP/ED): Performed by: NURSE PRACTITIONER

## 2023-10-13 PROCEDURE — C1894 INTRO/SHEATH, NON-LASER: HCPCS | Performed by: STUDENT IN AN ORGANIZED HEALTH CARE EDUCATION/TRAINING PROGRAM

## 2023-10-13 PROCEDURE — 93458 L HRT ARTERY/VENTRICLE ANGIO: CPT | Performed by: STUDENT IN AN ORGANIZED HEALTH CARE EDUCATION/TRAINING PROGRAM

## 2023-10-13 PROCEDURE — 99152 MOD SED SAME PHYS/QHP 5/>YRS: CPT | Performed by: STUDENT IN AN ORGANIZED HEALTH CARE EDUCATION/TRAINING PROGRAM

## 2023-10-13 PROCEDURE — 2550000001 HC RX 255 CONTRASTS: Performed by: STUDENT IN AN ORGANIZED HEALTH CARE EDUCATION/TRAINING PROGRAM

## 2023-10-13 RX ORDER — HEPARIN SODIUM 1000 [USP'U]/ML
INJECTION, SOLUTION INTRAVENOUS; SUBCUTANEOUS AS NEEDED
Status: DISCONTINUED | OUTPATIENT
Start: 2023-10-13 | End: 2023-10-13 | Stop reason: HOSPADM

## 2023-10-13 RX ORDER — HYDRALAZINE HYDROCHLORIDE 20 MG/ML
INJECTION INTRAMUSCULAR; INTRAVENOUS AS NEEDED
Status: DISCONTINUED | OUTPATIENT
Start: 2023-10-13 | End: 2023-10-13 | Stop reason: HOSPADM

## 2023-10-13 RX ORDER — FENTANYL CITRATE 50 UG/ML
50 INJECTION, SOLUTION INTRAMUSCULAR; INTRAVENOUS ONCE
Status: COMPLETED | OUTPATIENT
Start: 2023-10-13 | End: 2023-10-13

## 2023-10-13 RX ORDER — MIDAZOLAM HYDROCHLORIDE 1 MG/ML
INJECTION INTRAMUSCULAR; INTRAVENOUS AS NEEDED
Status: DISCONTINUED | OUTPATIENT
Start: 2023-10-13 | End: 2023-10-13 | Stop reason: HOSPADM

## 2023-10-13 RX ORDER — LIDOCAINE HYDROCHLORIDE 20 MG/ML
INJECTION, SOLUTION INFILTRATION; PERINEURAL AS NEEDED
Status: DISCONTINUED | OUTPATIENT
Start: 2023-10-13 | End: 2023-10-13 | Stop reason: HOSPADM

## 2023-10-13 RX ORDER — ASPIRIN 325 MG
TABLET ORAL AS NEEDED
Status: DISCONTINUED | OUTPATIENT
Start: 2023-10-13 | End: 2023-10-13 | Stop reason: HOSPADM

## 2023-10-13 RX ORDER — SODIUM CHLORIDE 9 MG/ML
100 INJECTION, SOLUTION INTRAVENOUS CONTINUOUS
Status: DISCONTINUED | OUTPATIENT
Start: 2023-10-13 | End: 2023-10-13 | Stop reason: HOSPADM

## 2023-10-13 RX ORDER — HYDRALAZINE HYDROCHLORIDE 20 MG/ML
5 INJECTION INTRAMUSCULAR; INTRAVENOUS EVERY 6 HOURS PRN
Status: DISCONTINUED | OUTPATIENT
Start: 2023-10-13 | End: 2023-10-13

## 2023-10-13 RX ORDER — FENTANYL CITRATE 50 UG/ML
INJECTION, SOLUTION INTRAMUSCULAR; INTRAVENOUS AS NEEDED
Status: DISCONTINUED | OUTPATIENT
Start: 2023-10-13 | End: 2023-10-13 | Stop reason: HOSPADM

## 2023-10-13 RX ADMIN — SODIUM CHLORIDE 100 ML/HR: 9 INJECTION, SOLUTION INTRAVENOUS at 07:55

## 2023-10-13 RX ADMIN — FENTANYL CITRATE 50 MCG: 50 INJECTION, SOLUTION INTRAMUSCULAR; INTRAVENOUS at 10:45

## 2023-10-13 RX ADMIN — HYDRALAZINE HYDROCHLORIDE 5 MG: 20 INJECTION INTRAMUSCULAR; INTRAVENOUS at 14:01

## 2023-10-13 RX ADMIN — HYDRALAZINE HYDROCHLORIDE 5 MG: 20 INJECTION INTRAMUSCULAR; INTRAVENOUS at 13:27

## 2023-10-13 ASSESSMENT — ENCOUNTER SYMPTOMS
NEUROLOGICAL NEGATIVE: 1
CARDIOVASCULAR NEGATIVE: 1
CONSTITUTIONAL NEGATIVE: 1
SHORTNESS OF BREATH: 1

## 2023-10-13 ASSESSMENT — PAIN SCALES - GENERAL
PAINLEVEL_OUTOF10: 5 - MODERATE PAIN
PAINLEVEL_OUTOF10: 0 - NO PAIN
PAINLEVEL_OUTOF10: 0 - NO PAIN
PAINLEVEL_OUTOF10: 3
PAINLEVEL_OUTOF10: 0 - NO PAIN
PAINLEVEL_OUTOF10: 3
PAINLEVEL_OUTOF10: 0 - NO PAIN
PAINLEVEL_OUTOF10: 8
PAINLEVEL_OUTOF10: 0 - NO PAIN
PAINLEVEL_OUTOF10: 5 - MODERATE PAIN

## 2023-10-13 ASSESSMENT — COLUMBIA-SUICIDE SEVERITY RATING SCALE - C-SSRS
2. HAVE YOU ACTUALLY HAD ANY THOUGHTS OF KILLING YOURSELF?: NO
1. IN THE PAST MONTH, HAVE YOU WISHED YOU WERE DEAD OR WISHED YOU COULD GO TO SLEEP AND NOT WAKE UP?: NO
6. HAVE YOU EVER DONE ANYTHING, STARTED TO DO ANYTHING, OR PREPARED TO DO ANYTHING TO END YOUR LIFE?: NO

## 2023-10-13 ASSESSMENT — PAIN - FUNCTIONAL ASSESSMENT
PAIN_FUNCTIONAL_ASSESSMENT: 0-10
PAIN_FUNCTIONAL_ASSESSMENT: 0-10

## 2023-10-13 NOTE — H&P
History Of Present Illness  Hugo Gauthier is a 62 y.o. male presenting with shortness of breath on exertion and abnormal nuclear Lexiscan stress testing on 9/25/2023. The stress test revealed, medium-sized area of partially reversible perfusion defect of the apical and inferior and interoseptal segment, consistent with moderate left ventricular systolic dysfunction on post stress gated imaging. LVEF 38%. We will perform LHC to evaluate for ischemia.      Past Medical History  Past Medical History:   Diagnosis Date    Arrhythmia     Clotting disorder (CMS/HCC)     Disease of thyroid gland     Hypertension     Personal history of diseases of the blood and blood-forming organs and certain disorders involving the immune mechanism 10/19/2021    History of iron deficiency anemia    Personal history of diseases of the blood and blood-forming organs and certain disorders involving the immune mechanism 10/19/2021    History of anemia       Surgical History  History reviewed. No pertinent surgical history.     Social History  He reports that he has quit smoking. His smoking use included cigarettes. He has a 20.00 pack-year smoking history. He has never been exposed to tobacco smoke. He has never used smokeless tobacco. He reports that he does not currently use alcohol. He reports that he does not currently use drugs.    Family History  No family history on file.     Allergies  Patient has no known allergies.    Review of Systems   Constitutional: Negative.    Respiratory:  Positive for shortness of breath.    Cardiovascular: Negative.    Neurological: Negative.         Physical Exam  Constitutional:       Appearance: Normal appearance.   Cardiovascular:      Rate and Rhythm: Normal rate and regular rhythm.      Pulses: Normal pulses.      Heart sounds: Normal heart sounds.   Pulmonary:      Effort: Pulmonary effort is normal.      Breath sounds: Normal breath sounds.   Skin:     General: Skin is warm and dry.   Neurological:       General: No focal deficit present.      Mental Status: He is alert and oriented to person, place, and time.          Last Recorded Vitals  Pulse 87, temperature 36.2 °C (97.2 °F), temperature source Tympanic, resp. rate 14, SpO2 100 %.    Relevant Results  Abnormal stress test, see above          Assessment/Plan   Abnormal Stress Test     [facility]             I spent 20 minutes in the professional and overall care of this patient.      Sruthi Slater, APRN-CNP

## 2023-10-13 NOTE — POST-PROCEDURE NOTE
Physician Transition of Care Summary  Invasive Cardiovascular Lab    Procedure Date: 10/13/2023  Attending:    Harini Lamas - Primary  Resident/Fellow/Other Assistant: No surgical staff documented.    Pre Procedure Indications:   Shortness of breath, abnormal stress test     Post Procedure Diagnosis:   Shortness of breath, abnormal stress test    Procedure(s):   Left Heart Catheterization    Procedure Findings:   Non-obstructive CAD    Description of the Procedure:   RRA>compression band   Hydralazine 10mg IVP     Complications:   None     Stents/Implants:   None     Anticoagulation/Antiplatelet Plan:       Estimated Blood Loss:   5 mL    Anesthesia: Moderate Sedation Anesthesia Staff: No anesthesia staff entered.    Any Specimen(s) Removed:   None     Disposition:   Home       Electronically signed by: SHERLEY Arredondo, 10/13/2023 11:30 AM

## 2023-10-13 NOTE — DISCHARGE INSTRUCTIONS

## 2023-10-15 NOTE — PROGRESS NOTES
[facility]    Patient Name: Hugo Gauthier  Patient : 1960      Scribe Attestation  By signing my name below, I, Dawit Doss   attest that this documentation has been prepared under the direction and in the presence of Gorge Medeiros DO.       Reason for visit:  This is a 62-year-old male here for follow-up regarding shortness of breath on exertion/abnormal stress, chronic fatigue/daytime tiredness, hypertension, and morbid obesity.      HPI:  This is a 62-year-old male here for follow-up regarding shortness of breath on exertion/abnormal stress, chronic fatigue/daytime tiredness, hypertension, and morbid obesity.  The patient was last evaluated by me in 2023.  At that visit I had ordered an echocardiogram, stress study, sleep study, increase lisinopril to 40 mg daily, and asked that he follow-up in 1 month.  Nuclear stress done 2023 showed a medium sized area of partially reversible perfusion defect in the apical/inferoseptal/inferior segments concerning for mixed scar/ischemia.  Patient was subsequently referred for a left heart catheterization.  Echocardiogram done in 2023 showed normal left ventricular systolic function with an ejection fraction of 60%, grade 1 diastolic dysfunction, and difficult imaging with poorly visualized anatomic structures.  Cardiac catheterization done on 10/13/23 showed minimal non obstructive coronary artery disease in a right dominant system.   and left Ventricular end-diastolic pressure = 20.  BMP done 10/4/2023 showed normal serum sodium and potassium with a serum creatinine 0.76, CBC showed a hemoglobin of 12.1. ECG done today showed showed sinus rhythm with a heart rate of 88 bpm.   The patient reports having an episode of chest pain described as heaviness as if someone was sitting on his chest which lasted for a couple of seconds. He also reports having shortness of breath on exertion. He denies  any new palpitations or lightheadedness. He states that he takes all of his medications as prescribed. During my exam, he was resting comfortably on the exam table.           Assessment/Plan:   1. Coronary artery disease/Chest pain  Cardiac catheterization done on 10/13/23 showed minimal non obstructive coronary artery disease in a right dominant system and left Ventricular end-diastolic pressure = 20.  Echocardiogram done in September 2023 showed normal left ventricular systolic function with an ejection fraction of 60%, grade 1 diastolic dysfunction, and difficult imaging with poorly visualized anatomic structures.    ECG done today showed sinus rhythm with a heart rate of 88 bpm.    He reports an episode of chest pain described as chest heaviness with associated shortness of breath as noted in the HPI.  Blood pressure appears suboptimally controlled on exam today.  I will start him on spironolactone 25 mg twice daily and furosemide 40 mg daily.  He should continue his other antihypertensive medications.  Recent lab works as noted in the HPI.   Lab works will be done in 1 week and again in 3 months prior to the next visit.   I discussed with him the importance of following a low-sodium heart healthy diet as well as weight loss.   Follow up in 3 and sooner if necessary.     2. HFpEF/shortness of breath on exertion  Cardiac catheterization done on 10/13/23 showed minimal non obstructive coronary artery disease in a right dominant system and left Ventricular end-diastolic pressure = 20.  Echocardiogram done in September 2023 showed normal left ventricular systolic function with an ejection fraction of 60%, grade 1 diastolic dysfunction, and difficult imaging with poorly visualized anatomic structures.    He reports an episode of chest pain described as chest heaviness with associated shortness of breath as noted in the HPI.  He does have 1+ pitting edema on exam today.  I will start him on spironolactone 25 mg twice  daily and furosemide 40 mg daily.  I will continue his other cardiac medications.  Recent lab works as noted in the HPI.   Lab works will be done in 1 week and again in 3 months prior to the next visit.   I discussed with him the importance of following a low-sodium heart healthy diet as well as weight loss.   Follow up in 3 months and sooner if necessary.     3. Chronic fatigue/daytime tiredness  The previously reported chronic fatigue/daytime sleepiness had remained stable since the last visit.   We will obtain result of recent sleep study.    4. Hypertension  The patient has a history of hypertension which appears controlled on exam today.  He should continue his current antihypertensive medications.    5. Morbid obesity  Please see lifestyle recommendations below.       Orders:   Start spironolactone 25 mg twice daily and furosemide 40 mg daily.  BMP/BNP/Mg in 1 week  CMP/CBC/lipid/Mg/BNP in 3 months  Obtain recent sleep study result.  Follow-up in 3 months    Lifestyle Recommendations  I recommend a whole-food plant-based diet, an eating pattern that encourages the consumption of unrefined plant foods (such as fruits, vegetables, tubers, whole grains, legumes, nuts and seeds) and discourages meats, dairy products, eggs and processed foods.     The AHA/ACC recommends that the patient consume a dietary pattern that emphasizes intake of vegetables, fruits, and whole grains; includes low-fat dairy products, poultry, fish, legumes, non-tropical vegetable oils, and nuts; and limits intake of sodium, sweets, sugar-sweetened beverages, and red meats.  Adapt this dietary pattern to appropriate calorie requirements (a 500-750 kcal/day deficit to loose weight), personal and cultural food preferences, and nutrition therapy for other medical conditions (including diabetes).  Achieve this pattern by following plans such as the [place] Mediterranean, DASH dietary pattern, or AHA diet.     Engage in 2 hours and 30 minutes per  week of moderate-intensity physical activity, or 1 hour and 15 minutes (75 minutes) per week of vigorous-intensity aerobic physical activity, or an equivalent combination of moderate and vigorous-intensity aerobic physical activity. Aerobic activity should be performed in episodes of at least 10 minutes preferably spread throughout the week.     Adhering to a heart healthy diet, regular exercise habits, avoidance of tobacco products, and maintenance of a healthy weight are crucial components of their heart disease risk reduction.     Any positive review of systems not specifically addressed in the office visit today should be evaluated and treated by the patients primary care physician or in an emergency department if necessary     Patient was notified that results from ordered tests will be called to the patient if it changes current management; it will otherwise be discussed at a future appointment and available on  [facility].     Thank you for allowing me to participate in the care of this patient.        This document was generated using the assistance of voice recognition software. If there are any errors of spelling, grammar, syntax, or meaning; please feel free to contact me directly for clarification.    Past Medical History:  He has a past medical history of Arrhythmia, Clotting disorder (CMS/[facility]), Disease of thyroid gland, Hypertension, Personal history of diseases of the blood and blood-forming organs and certain disorders involving the immune mechanism (10/19/2021), and Personal history of diseases of the blood and blood-forming organs and certain disorders involving the immune mechanism (10/19/2021).    He has no past medical history of AAA (abdominal aortic aneurysm) (CMS/[facility]), Asthma, Cancer (CMS/[facility]), Carotid artery occlusion, CHF (congestive heart failure) (CMS/[facility]), Chronic kidney disease, Coronary artery disease, Diabetes mellitus (CMS/[facility]), Heart murmur, Hyperlipidemia, Stroke (CMS/[facility]), or  "Syncope.    Past Surgical History:  He has no past surgical history on file.      Social History:  He reports that he has quit smoking. His smoking use included cigarettes. He has a 20.00 pack-year smoking history. He has never been exposed to tobacco smoke. He has never used smokeless tobacco. He reports that he does not currently use alcohol. He reports that he does not currently use drugs.    Family History:  No family history on file.     Allergies:  Patient has no known allergies.    Outpatient Medications:  Current Outpatient Medications   Medication Instructions    alpha tocopherol (Vitamin E) 268 mg (400 unit) capsule oral    ascorbic acid (Vitamin C) 1,000 mg tablet 1 tablet, oral, Daily    cholecalciferol (Vitamin D-3) 50 MCG (2000 UT) tablet 1 tablet, oral, Daily    dilTIAZem (CARDIZEM) 60 mg, oral, Daily    folic acid (Folvite) 800 mcg tablet 1 tablet, oral, Daily    iodine, bulk, crystals USE AS DIRECTED.    ketoconazole (NIZOral) 2 % cream     lisinopril 40 mg, oral, Daily    magnesium oxide (Mag-Ox) 400 mg tablet 1 tablet, oral, Daily    nystatin (Mycostatin) 100,000 unit/gram powder Topical, 2 times daily    selenium 200 mcg tablet 1 tablet, oral, Daily    silver sulfADIAZINE (Silvadene) 1 % cream APPLY AND RUB IN A THIN FILM TO AFFECTED AREAS TWICE DAILY.(AM AND PM).    syringe with needle (BD Luer-Juliette Syringe) 3 mL 25 gauge x 1\" syringe 1 mL, miscellaneous, Every 14 days, Using syringe. 2 per month.    testosterone cypionate (DEPO-TESTOSTERONE) 200 mg, intramuscular, Every 14 days    triamcinolone (Kenalog) 0.1 % cream APPLY 2-3 TIMES DAILY TO AFFECTED AREA(S).    warfarin (Coumadin) 4 mg tablet TAKE 1 TABLET EVERY DAY    warfarin (COUMADIN) 0.5 mg, oral, Daily, Last dose 10/8     zinc gluconate 100 mg, oral, Daily        ROS:  A 14 point review of systems was done and is negative other than as stated in HPI    Vitals:      10/13/2023    11:30 AM 10/13/2023    12:00 PM 10/13/2023    12:30 PM " 10/13/2023     1:00 PM 10/13/2023     1:35 PM 10/13/2023     2:00 PM 10/13/2023     2:15 PM   Vitals   Systolic 153 150 192 176 188 195 177   Diastolic 69 78 103 102 95 111 91   Heart Rate 87 88 89 88 87 89 95   Resp 18 14 18 18   16        Physical Exam:     Constitutional: Cooperative, in no acute distress, alert, appears stated age.   Skin: Skin color, texture, turgor normal. No rashes or lesions.   Head: Normocephalic. No masses, lesions, tenderness or abnormalities   Eyes: Extraocular movements are grossly intact.   Mouth and throat: Mucous membranes moist   Neck: Neck supple, no carotid bruits, no JVD   Respiratory: Lungs clear to auscultation, no wheezing or rhonchi, no use of accessory muscles   Chest wall: No scars, normal excursion with respiration   Cardiovascular: Regular rhythm without murmur  Gastrointestinal: Abdomen soft, nontender. Bowel sounds normal. Morbidly obese.  Musculoskeletal: Strength equal in upper extremities   Extremities: 1+ pitting edema   Neurologic: Sensation grossly intact, alert and oriented ×3          Intake/Output:   No intake/output data recorded.    Outpatient Medications  Current Outpatient Medications on File Prior to Visit   Medication Sig Dispense Refill    alpha tocopherol (Vitamin E) 268 mg (400 unit) capsule Take by mouth.      ascorbic acid (Vitamin C) 1,000 mg tablet Take 1 tablet (1,000 mg) by mouth once daily.      cholecalciferol (Vitamin D-3) 50 MCG (2000 UT) tablet Take 1 tablet (50 mcg) by mouth once daily.      dilTIAZem (Cardizem) 60 mg immediate release tablet Take 1 tablet (60 mg) by mouth once daily. 90 tablet 3    folic acid (Folvite) 800 mcg tablet Take 1 tablet (800 mcg) by mouth once daily.      iodine, bulk, crystals USE AS DIRECTED.      ketoconazole (NIZOral) 2 % cream       lisinopril 40 mg tablet Take 1 tablet (40 mg) by mouth once daily.      magnesium oxide (Mag-Ox) 400 mg tablet Take 1 tablet (400 mg) by mouth once daily.      nystatin  "(Mycostatin) 100,000 unit/gram powder Apply topically 2 times a day. 60 g 2    selenium 200 mcg tablet Take 1 tablet (200,000 mcg) by mouth once daily.      silver sulfADIAZINE (Silvadene) 1 % cream APPLY AND RUB IN A THIN FILM TO AFFECTED AREAS TWICE DAILY.(AM AND PM). 85 g 0    syringe with needle (BD Luer-Juliette Syringe) 3 mL 25 gauge x 1\" syringe 1 mL every 14 (fourteen) days. Using syringe. 2 per month. 2 each 5    testosterone cypionate (Depo-Testosterone) 200 mg/mL injection Inject 1 mL (200 mg) into the shoulder, thigh, or buttocks every 14 (fourteen) days. 2 mL 5    triamcinolone (Kenalog) 0.1 % cream APPLY 2-3 TIMES DAILY TO AFFECTED AREA(S). 80 g 0    warfarin (Coumadin) 1 mg tablet Take 0.5 tablets (0.5 mg) by mouth once daily. Last dose 10/8      warfarin (Coumadin) 4 mg tablet TAKE 1 TABLET EVERY DAY (Patient taking differently: Take 1 tablet (4 mg) by mouth 1 time. 10/8 last dose) 30 tablet 0    zinc gluconate 100 mg tablet Take 1 tablet (100 mg) by mouth once daily.  0    [DISCONTINUED] cefdinir (Omnicef) 300 mg capsule Take 1 capsule (300 mg) by mouth once daily.      [DISCONTINUED] ketoconazole (NIZOral) 200 mg tablet       [DISCONTINUED] predniSONE (Deltasone) 20 mg tablet Take 3 tabs (60mg) daily for 3 days, then take 2 tabs (40mg) daily for 3 days, then take 1 tab (20mg) daily for 3 days. (Patient not taking: Reported on 9/5/2023) 18 tablet 0     No current facility-administered medications on file prior to visit.       Labs: (past 26 weeks)  Recent Results (from the past 4368 hour(s))   Comprehensive Metabolic Panel    Collection Time: 06/27/23  2:00 PM   Result Value Ref Range    Glucose 124 (H) 74 - 99 mg/dL    Sodium 138 136 - 145 mmol/L    Potassium 4.1 3.5 - 5.3 mmol/L    Chloride 103 98 - 107 mmol/L    Bicarbonate 29 21 - 32 mmol/L    Anion Gap 10 10 - 20 mmol/L    Urea Nitrogen 12 6 - 23 mg/dL    Creatinine 0.76 0.50 - 1.30 mg/dL    GFR MALE >90 >90 mL/min/1.73m2    Calcium 9.3 8.6 - 10.3 " mg/dL    Albumin 4.0 3.4 - 5.0 g/dL    Alkaline Phosphatase 44 33 - 136 U/L    Total Protein 7.3 6.4 - 8.2 g/dL    AST 23 9 - 39 U/L    Total Bilirubin 0.5 0.0 - 1.2 mg/dL    ALT (SGPT) 31 10 - 52 U/L   CBC    Collection Time: 06/27/23  2:00 PM   Result Value Ref Range    WBC 5.1 4.4 - 11.3 x10E9/L    RBC 4.51 4.50 - 5.90 x10E12/L    Hemoglobin 12.5 (L) 13.5 - 17.5 g/dL    Hematocrit 41.7 41.0 - 52.0 %    MCV 92 80 - 100 fL    MCHC 30.0 (L) 32.0 - 36.0 g/dL    Platelets 219 150 - 450 x10E9/L    RDW 18.5 (H) 11.5 - 14.5 %   POCT INR manually resulted    Collection Time: 07/17/23  8:28 AM   Result Value Ref Range    POC INR 1.5 (A) 0.9 - 1.1   Renal Function Panel    Collection Time: 08/01/23  3:13 PM   Result Value Ref Range    Glucose 99 74 - 99 mg/dL    Sodium 140 136 - 145 mmol/L    Potassium 4.3 3.5 - 5.3 mmol/L    Chloride 104 98 - 107 mmol/L    Bicarbonate 29 21 - 32 mmol/L    Anion Gap 11 10 - 20 mmol/L    Urea Nitrogen 14 6 - 23 mg/dL    Creatinine 0.86 0.50 - 1.30 mg/dL    GFR MALE >90 >90 mL/min/1.73m2    Calcium 9.3 8.6 - 10.3 mg/dL    Phosphorus 3.5 2.5 - 4.9 mg/dL    Albumin 4.1 3.4 - 5.0 g/dL   Hemoglobin A1C    Collection Time: 08/21/23 10:09 AM   Result Value Ref Range    Hemoglobin A1C 6.2 (A) %    Estimated Average Glucose 131 MG/DL   CBC and Auto Differential    Collection Time: 08/21/23 10:09 AM   Result Value Ref Range    WBC 6.2 4.4 - 11.3 x10E9/L    RBC 4.42 (L) 4.50 - 5.90 x10E12/L    Hemoglobin 12.3 (L) 13.5 - 17.5 g/dL    Hematocrit 41.5 41.0 - 52.0 %    MCV 94 80 - 100 fL    MCHC 29.6 (L) 32.0 - 36.0 g/dL    Platelets 217 150 - 450 x10E9/L    RDW 17.2 (H) 11.5 - 14.5 %    Neutrophils % 61.4 40.0 - 80.0 %    Immature Granulocytes %, Automated 0.2 0.0 - 0.9 %    Lymphocytes % 21.8 13.0 - 44.0 %    Monocytes % 11.8 2.0 - 10.0 %    Eosinophils % 4.2 0.0 - 6.0 %    Basophils % 0.6 0.0 - 2.0 %    Neutrophils Absolute 3.79 1.20 - 7.70 x10E9/L    Lymphocytes Absolute 1.35 1.20 - 4.80 x10E9/L     Monocytes Absolute 0.73 0.10 - 1.00 x10E9/L    Eosinophils Absolute 0.26 0.00 - 0.70 x10E9/L    Basophils Absolute 0.04 0.00 - 0.10 x10E9/L   Comprehensive Metabolic Panel    Collection Time: 08/21/23 10:09 AM   Result Value Ref Range    Glucose 116 (H) 74 - 99 mg/dL    Sodium 138 136 - 145 mmol/L    Potassium 4.6 3.5 - 5.3 mmol/L    Chloride 104 98 - 107 mmol/L    Bicarbonate 27 21 - 32 mmol/L    Anion Gap 12 10 - 20 mmol/L    Urea Nitrogen 16 6 - 23 mg/dL    Creatinine 0.75 0.50 - 1.30 mg/dL    GFR MALE >90 >90 mL/min/1.73m2    Calcium 9.3 8.6 - 10.3 mg/dL    Albumin 4.0 3.4 - 5.0 g/dL    Alkaline Phosphatase 43 33 - 136 U/L    Total Protein 6.9 6.4 - 8.2 g/dL    AST 26 9 - 39 U/L    Total Bilirubin 0.5 0.0 - 1.2 mg/dL    ALT (SGPT) 28 10 - 52 U/L   Testosterone    Collection Time: 08/21/23 10:09 AM   Result Value Ref Range    Testosterone 96 (L) 240 - 1000 ng/dL   Testosterone    Collection Time: 09/05/23  8:07 AM   Result Value Ref Range    Testosterone 75 (L) 240 - 1000 ng/dL   POCT INR manually resulted    Collection Time: 09/05/23  3:34 PM   Result Value Ref Range    POC INR 4.0 (A) 0.9 - 1.1   Tissue/Wound Culture/Smear    Collection Time: 09/13/23 11:40 AM    Specimen: Tissue/Wound    L 2ND TOE   Result Value Ref Range    Gram Stain       2+ GRANULOCYTES.~4+ GRAM (-) BACILLI.  2+ GRAM (+) COCCI    Tissue/Wound Culture/Smear (A)        4+~AEROBIC MIXED BACTERIA~   ANAEROBIC MIXED BACTERIA    Tissue/Wound Culture/Smear Streptococcus, group C (A)    Basic Metabolic Panel    Collection Time: 10/04/23  9:39 AM   Result Value Ref Range    Glucose 119 (H) 74 - 99 mg/dL    Sodium 137 136 - 145 mmol/L    Potassium 4.4 3.5 - 5.3 mmol/L    Chloride 102 98 - 107 mmol/L    Bicarbonate 26 21 - 32 mmol/L    Anion Gap 13 10 - 20 mmol/L    Urea Nitrogen 12 6 - 23 mg/dL    Creatinine 0.76 0.50 - 1.30 mg/dL    eGFR >90 >60 mL/min/1.73m*2    Calcium 9.6 8.6 - 10.3 mg/dL   CBC    Collection Time: 10/04/23  9:39 AM   Result  Value Ref Range    WBC 5.6 4.4 - 11.3 x10*3/uL    nRBC 0.0 0.0 - 0.0 /100 WBCs    RBC 4.26 (L) 4.50 - 5.90 x10*6/uL    Hemoglobin 12.1 (L) 13.5 - 17.5 g/dL    Hematocrit 39.4 (L) 41.0 - 52.0 %    MCV 93 80 - 100 fL    MCH 28.4 26.0 - 34.0 pg    MCHC 30.7 (L) 32.0 - 36.0 g/dL    RDW 16.4 (H) 11.5 - 14.5 %    Platelets 225 150 - 450 x10*3/uL    MPV 9.6 7.5 - 11.5 fL   POCT PT/INR    Collection Time: 10/13/23  7:38 AM   Result Value Ref Range    POCT Prothrombin time 22.0 seconds    POCT INR 1.8        ECG  No results found for this or any previous visit (from the past 4464 hour(s)).    Echocardiogram  No results found for this or any previous visit from the past 1095 days.    Problem List Items Addressed This Visit       HTN (hypertension) - Primary    SOBOE (shortness of breath on exertion)    Chronic fatigue    Daytime somnolence    Coronary artery disease involving native coronary artery of native heart         Gorge Medeiros DO, FACC, FACOI

## 2023-10-17 ENCOUNTER — OFFICE VISIT (OUTPATIENT)
Dept: INFECTIOUS DISEASES | Facility: HOSPITAL | Age: 63
End: 2023-10-17
Payer: COMMERCIAL

## 2023-10-17 VITALS
SYSTOLIC BLOOD PRESSURE: 158 MMHG | DIASTOLIC BLOOD PRESSURE: 81 MMHG | OXYGEN SATURATION: 96 % | RESPIRATION RATE: 22 BRPM | HEART RATE: 90 BPM

## 2023-10-17 DIAGNOSIS — M86.372: Primary | ICD-10-CM

## 2023-10-17 ASSESSMENT — ENCOUNTER SYMPTOMS
OCCASIONAL FEELINGS OF UNSTEADINESS: 0
DEPRESSION: 0
LOSS OF SENSATION IN FEET: 0

## 2023-10-17 ASSESSMENT — COLUMBIA-SUICIDE SEVERITY RATING SCALE - C-SSRS
1. IN THE PAST MONTH, HAVE YOU WISHED YOU WERE DEAD OR WISHED YOU COULD GO TO SLEEP AND NOT WAKE UP?: NO
2. HAVE YOU ACTUALLY HAD ANY THOUGHTS OF KILLING YOURSELF?: NO
6. HAVE YOU EVER DONE ANYTHING, STARTED TO DO ANYTHING, OR PREPARED TO DO ANYTHING TO END YOUR LIFE?: NO

## 2023-10-17 ASSESSMENT — PATIENT HEALTH QUESTIONNAIRE - PHQ9
2. FEELING DOWN, DEPRESSED OR HOPELESS: NOT AT ALL
SUM OF ALL RESPONSES TO PHQ9 QUESTIONS 1 AND 2: 0
1. LITTLE INTEREST OR PLEASURE IN DOING THINGS: NOT AT ALL

## 2023-10-17 ASSESSMENT — PAIN SCALES - GENERAL: PAINLEVEL: 3

## 2023-10-17 NOTE — LETTER
10/17/23    Edilma Luna MD  [id number] [facility] [id number]      Dear Dr. Edilma Luna MD,    I am writing to confirm that your patient, Hugo Gauthier, received care in my office on 10/17/23. I have enclosed a summary of the care provided to Hugo for your reference.    Please contact me with any questions you may have regarding the visit.    Sincerely,         Len Licona MD  [address] [id number]    CC: No Recipients

## 2023-10-17 NOTE — PROGRESS NOTES
Subjective   Patient ID: Hugo Gauthier is a 62 y.o. male.        Mr. Gauthier is a 61-year-old male with a history of DVT on Coumadin, hypertension, obesity chronic bilateral lower extremity wounds and stasis changes follows up at wound Care center recent admission for left lower extremity cellulitis and infected wounds was hospitalized from April 1 to April 7, wound cultures resulted in MSSA and patient was treated with IV antibiotics and was discharged on Keflex to complete the treatment.    Patient has had several courses of Keflex due to worsening wound in the recent past  Given prescription of flagyl and keflex after last visit due to draining purulent wound of the left lower extremity, 10 day supply    Patient reports that his left lower extremity wounds appear to be improved from his last visit, on exam the posterior calf wound does appear improved.     Denies any fevers, chills, or any other complaints.     Has been working to improve diet and cut out sugar.    Labs done after last visit show WBC within normal limits       Patient reported that his left lower wounds appear to be better.  Less drainage denies any fevers chills patient seen wound care as well as Dr. Snyder  On my exam the posterior calf wound is improved second toe with small superficial wound with minimal drainage  CT scan that was ordered in September did not show any abscess or osteomyelitis  Labs without any evidence of leukocytosis but was obtained 2 weeks ago    Denies any fevers, chills, or any other complaints.   Has been working to improve diet and cut out sugar.        Review of Systems  12 point review of systems noted to be negative except as noted in HPI  Objective   Physical Exam  General : AAO X 3, well appearing obese male, appears stated age, NAD  HEENT: Normocephalic, atrauamtic  Lungs: CTAB   CV: no murmurs, RRR  Abdomen : obese abdomen, soft, BS +, non tender   extremities: Left lower extremity venous stasis dermatitis present.  Left second toe ulcer at the base of the digit with mild purulent drainage Leg has dry, flakey skin and is erythematous. Mildly warm. Ulcer present on posterior left leg, improvement noted. Onychomycosis present/however has improved  Laboratory:         Assessment/Plan   Bilateral lower extremity stasis changes  Hypertension  History of DVT on Coumadin  Plan  Will not prescribe any more antibiotics/patient will continue care and follow-up with podiatry  CT left lower extremity without contrast to evaluate abscess formation about the left toe reviewed skin thickening with reticular increased density in the subcutaneous  tissues which may represent lymphedema and/or cellulitis.  No osteomyelitis noted   Patient and his wife in the room verbalized understanding of the plan  Patient verbalized understanding of the plan  Advised strict glycemic control and follow-up with primary last   We will follow the patient as needed basis  Patient told to call for questions and concerns      Len Licona MD

## 2023-10-20 ENCOUNTER — OFFICE VISIT (OUTPATIENT)
Dept: CARDIOLOGY | Facility: CLINIC | Age: 63
End: 2023-10-20
Payer: COMMERCIAL

## 2023-10-20 VITALS
BODY MASS INDEX: 45.1 KG/M2 | HEART RATE: 88 BPM | DIASTOLIC BLOOD PRESSURE: 92 MMHG | HEIGHT: 70 IN | WEIGHT: 315 LBS | SYSTOLIC BLOOD PRESSURE: 160 MMHG

## 2023-10-20 DIAGNOSIS — R07.89 OTHER CHEST PAIN: ICD-10-CM

## 2023-10-20 DIAGNOSIS — I10 PRIMARY HYPERTENSION: Primary | ICD-10-CM

## 2023-10-20 DIAGNOSIS — I25.10 CORONARY ARTERY DISEASE INVOLVING NATIVE CORONARY ARTERY OF NATIVE HEART WITHOUT ANGINA PECTORIS: ICD-10-CM

## 2023-10-20 DIAGNOSIS — R53.82 CHRONIC FATIGUE: ICD-10-CM

## 2023-10-20 DIAGNOSIS — R06.02 SOBOE (SHORTNESS OF BREATH ON EXERTION): ICD-10-CM

## 2023-10-20 DIAGNOSIS — I50.32 CHRONIC HEART FAILURE WITH PRESERVED EJECTION FRACTION (MULTI): ICD-10-CM

## 2023-10-20 DIAGNOSIS — R40.0 DAYTIME SOMNOLENCE: ICD-10-CM

## 2023-10-20 PROBLEM — I50.30 (HFPEF) HEART FAILURE WITH PRESERVED EJECTION FRACTION (MULTI): Status: ACTIVE | Noted: 2023-10-20

## 2023-10-20 PROCEDURE — 1036F TOBACCO NON-USER: CPT | Performed by: INTERNAL MEDICINE

## 2023-10-20 PROCEDURE — 3080F DIAST BP >= 90 MM HG: CPT | Performed by: INTERNAL MEDICINE

## 2023-10-20 PROCEDURE — 3077F SYST BP >= 140 MM HG: CPT | Performed by: INTERNAL MEDICINE

## 2023-10-20 PROCEDURE — 3044F HG A1C LEVEL LT 7.0%: CPT | Performed by: INTERNAL MEDICINE

## 2023-10-20 PROCEDURE — 4010F ACE/ARB THERAPY RXD/TAKEN: CPT | Performed by: INTERNAL MEDICINE

## 2023-10-20 PROCEDURE — 93000 ELECTROCARDIOGRAM COMPLETE: CPT | Performed by: INTERNAL MEDICINE

## 2023-10-20 PROCEDURE — 3008F BODY MASS INDEX DOCD: CPT | Performed by: INTERNAL MEDICINE

## 2023-10-20 PROCEDURE — 99214 OFFICE O/P EST MOD 30 MIN: CPT | Performed by: INTERNAL MEDICINE

## 2023-10-20 RX ORDER — FUROSEMIDE 40 MG/1
40 TABLET ORAL DAILY
Qty: 90 TABLET | Refills: 1 | Status: SHIPPED | OUTPATIENT
Start: 2023-10-20 | End: 2024-05-28

## 2023-10-20 RX ORDER — SPIRONOLACTONE 25 MG/1
25 TABLET ORAL 2 TIMES DAILY
Qty: 180 TABLET | Refills: 1 | Status: SHIPPED | OUTPATIENT
Start: 2023-10-20 | End: 2024-01-22 | Stop reason: SDUPTHER

## 2023-10-23 ENCOUNTER — OFFICE VISIT (OUTPATIENT)
Dept: WOUND CARE | Facility: CLINIC | Age: 63
End: 2023-10-23
Payer: COMMERCIAL

## 2023-10-23 ENCOUNTER — LAB (OUTPATIENT)
Dept: LAB | Facility: LAB | Age: 63
End: 2023-10-23
Payer: COMMERCIAL

## 2023-10-23 DIAGNOSIS — I25.10 CORONARY ARTERY DISEASE INVOLVING NATIVE CORONARY ARTERY OF NATIVE HEART WITHOUT ANGINA PECTORIS: ICD-10-CM

## 2023-10-23 DIAGNOSIS — R40.0 DAYTIME SOMNOLENCE: ICD-10-CM

## 2023-10-23 DIAGNOSIS — I10 PRIMARY HYPERTENSION: ICD-10-CM

## 2023-10-23 DIAGNOSIS — R07.89 OTHER CHEST PAIN: ICD-10-CM

## 2023-10-23 DIAGNOSIS — R53.82 CHRONIC FATIGUE: ICD-10-CM

## 2023-10-23 DIAGNOSIS — I50.32 CHRONIC HEART FAILURE WITH PRESERVED EJECTION FRACTION (MULTI): ICD-10-CM

## 2023-10-23 DIAGNOSIS — R06.02 SOBOE (SHORTNESS OF BREATH ON EXERTION): ICD-10-CM

## 2023-10-23 LAB
ANION GAP SERPL CALC-SCNC: 17 MMOL/L (ref 10–20)
BNP SERPL-MCNC: 12 PG/ML (ref 0–99)
BUN SERPL-MCNC: 14 MG/DL (ref 6–23)
CALCIUM SERPL-MCNC: 9.4 MG/DL (ref 8.6–10.3)
CHLORIDE SERPL-SCNC: 99 MMOL/L (ref 98–107)
CO2 SERPL-SCNC: 25 MMOL/L (ref 21–32)
CREAT SERPL-MCNC: 0.81 MG/DL (ref 0.5–1.3)
GFR SERPL CREATININE-BSD FRML MDRD: >90 ML/MIN/1.73M*2
GLUCOSE SERPL-MCNC: 116 MG/DL (ref 74–99)
MAGNESIUM SERPL-MCNC: 2.05 MG/DL (ref 1.6–2.4)
POTASSIUM SERPL-SCNC: 4.4 MMOL/L (ref 3.5–5.3)
SODIUM SERPL-SCNC: 137 MMOL/L (ref 136–145)

## 2023-10-23 PROCEDURE — 83880 ASSAY OF NATRIURETIC PEPTIDE: CPT

## 2023-10-23 PROCEDURE — 36415 COLL VENOUS BLD VENIPUNCTURE: CPT

## 2023-10-23 PROCEDURE — 11042 DBRDMT SUBQ TIS 1ST 20SQCM/<: CPT | Performed by: CLINICAL NURSE SPECIALIST

## 2023-10-23 PROCEDURE — 11045 DBRDMT SUBQ TISS EACH ADDL: CPT

## 2023-10-23 PROCEDURE — 3044F HG A1C LEVEL LT 7.0%: CPT | Performed by: CLINICAL NURSE SPECIALIST

## 2023-10-23 PROCEDURE — 83735 ASSAY OF MAGNESIUM: CPT

## 2023-10-23 PROCEDURE — 11045 DBRDMT SUBQ TISS EACH ADDL: CPT | Performed by: CLINICAL NURSE SPECIALIST

## 2023-10-23 PROCEDURE — 1036F TOBACCO NON-USER: CPT | Performed by: CLINICAL NURSE SPECIALIST

## 2023-10-23 PROCEDURE — 80048 BASIC METABOLIC PNL TOTAL CA: CPT

## 2023-10-23 PROCEDURE — 3008F BODY MASS INDEX DOCD: CPT | Performed by: CLINICAL NURSE SPECIALIST

## 2023-10-23 PROCEDURE — 4010F ACE/ARB THERAPY RXD/TAKEN: CPT | Performed by: CLINICAL NURSE SPECIALIST

## 2023-10-23 PROCEDURE — 11042 DBRDMT SUBQ TIS 1ST 20SQCM/<: CPT

## 2023-10-25 ENCOUNTER — TELEPHONE (OUTPATIENT)
Dept: CARDIOLOGY | Facility: CLINIC | Age: 63
End: 2023-10-25
Payer: COMMERCIAL

## 2023-10-25 DIAGNOSIS — G47.33 OBSTRUCTIVE SLEEP APNEA SYNDROME: ICD-10-CM

## 2023-10-25 DIAGNOSIS — I87.2 CHRONIC VENOUS STASIS DERMATITIS OF BOTH LOWER EXTREMITIES: Primary | ICD-10-CM

## 2023-10-25 NOTE — TELEPHONE ENCOUNTER
----- Message from Gorge Medeiros DO sent at 10/23/2023 12:11 PM EDT -----  BNP is within normal limits.  Continue plan of care discussed at office visit.  Patient also reportedly had a sleep study done recently and was requesting results and his office visit.  I was unable to see results during that visit.  Please obtain the sleep study results to review with the patient.  If sleep apnea is present please refer patient to sleep medicine for management.    10/25/23  1255  Called results to patient for BNP and sleep study. Informed patient of results and that referrals have been placed for sleep medicine and for vascular medicine; vascular medicine for patient request and sleep medicine for [facility] on sleep study.    Patient verbalized understanding of results and is agreeable to plan.      Task sent to [facility] for scheduling.

## 2023-10-27 PROBLEM — G47.30 SLEEP APNEA: Status: ACTIVE | Noted: 2023-10-27

## 2023-10-27 PROBLEM — Z01.810 PREOPERATIVE CARDIOVASCULAR EXAMINATION: Status: ACTIVE | Noted: 2023-10-27

## 2023-10-27 NOTE — PROGRESS NOTES
[facility] V[facility] Card[facility]    Patient Name: Hugo Gauthier  Patient : 1960      Scribe Attestation  By signing my name below, I,Dawit Doss   attest that this documentation has been prepared under the direction and in the presence of Gorge Medeiros DO.       Reason for visit:  This is a 62-year-old male here for follow-up regarding his history of coronary artery disease/chest pain, HFpEF/shortness of breath on exertion, chronic fatigue/daytime tiredness, hypertension, morbid obesity, and for preoperative cardiovascular examination prior to a vascular procedure.     HPI:  This is a 62-year-old male here for follow-up regarding his history of coronary artery disease/chest pain, HFpEF/shortness of breath on exertion, chronic fatigue/daytime tiredness, hypertension, morbid obesity, and for preoperative cardiovascular examination prior to a vascular procedure.  The patient was last evaluated by me on 10/20/2023.  At that visit I started him on spironolactone 25 mg twice daily, furosemide 40 mg daily, ordered blood work including BMP/BNP/magnesium in 1 week, ordered a CMP/CBC/lipid/magnesium/BMP in 3 months, requested results of his sleep study, and asked that he follow-up in 3 months.  Sleep study results obtained showing obstructive sleep apnea and patient was referred to sleep medicine for management.  Patient had also requested a referral to vascular medicine and was referred at that time.  BMP done 10/23/2023 showed normal serum sodium and potassium with a serum creatinine 0.81, serum magnesium was 2.05, BNP was 12. ECG done today showed sinus rhythm with a heart rate of 91 bpm.  The patient reports that he has been feeling generally well from the cardiac standpoint. He denies any new chest pain, shortness of breath, palpitations and lightheadedness. He states that his blood pressure and lower extremity edema has improved since the last visit. He states he has an upcoming  appointment with sleep medicine. He states that he takes all of his medications as prescribed. During my exam, he was resting comfortably on the exam table.            Assessment/Plan:   1. Coronary artery disease/Chest pain  The patient had a history of minimal nonobstructive coronary artery disease in a right dominant system and left Ventricular end-diastolic pressure = 20 seen on cardiac catheterization.   Echocardiogram done in September 2023 showed normal left ventricular systolic function with an ejection fraction of 60%, grade 1 diastolic dysfunction, and difficult imaging with poorly visualized anatomic structures.    ECG done today showed sinus rhythm with a heart rate of 91 bpm.     He denies recurrence of chest pain/ chest heaviness since the last visit.   Blood pressure appears controlled on exam today.  He should continue his current antihypertensive medications.  Recent lab works as noted in the HPI.   I discussed with him the importance of following a low-sodium heart healthy diet as well as weight loss.   Follow up as previously recommended.     2. HFpEF/shortness of breath on exertion  The patient has a history of HFpEF.  Cardiac catheterization done on 10/13/23 showed minimal non obstructive coronary artery disease in a right dominant system and left ventricular end-diastolic pressure = 20.  Echocardiogram done in September 2023 showed normal left ventricular systolic function with an ejection fraction of 60%, grade 1 diastolic dysfunction, and difficult imaging with poorly visualized anatomic structures.    He should continue his current cardiac medications.  Recent lab works as noted in the HPI.   I discussed with him the importance of following a low-sodium heart healthy diet as well as weight loss.   Follow up as previously recommended.     3. Sleep apnea/Chronic fatigue/daytime tiredness  The previously reported chronic fatigue/daytime sleepiness had remained unchanged since the last visit.    Sleep study results obtained showing obstructive sleep apnea and patient had been referred to sleep medicine for management.      4. Hypertension  The patient has a history of hypertension which appears controlled on exam today.  He should continue his current antihypertensive medications.     5. Morbid obesity  Please see lifestyle recommendations below.     6. Sleep apnea  The patient has a history of sleep apnea. He should continue to follow with Sleep Medicine.       Order:   Refer to Vascular Surgery (Previously placed)  Follow-up as previously recommended with lab works as previously recommended.    Lifestyle Recommendations  I recommend a whole-food plant-based diet, an eating pattern that encourages the consumption of unrefined plant foods (such as fruits, vegetables, tubers, whole grains, legumes, nuts and seeds) and discourages meats, dairy products, eggs and processed foods.     The AHA/ACC recommends that the patient consume a dietary pattern that emphasizes intake of vegetables, fruits, and whole grains; includes low-fat dairy products, poultry, fish, legumes, non-tropical vegetable oils, and nuts; and limits intake of sodium, sweets, sugar-sweetened beverages, and red meats.  Adapt this dietary pattern to appropriate calorie requirements (a 500-750 kcal/day deficit to loose weight), personal and cultural food preferences, and nutrition therapy for other medical conditions (including diabetes).  Achieve this pattern by following plans such as the [place] Mediterranean, DASH dietary pattern, or AHA diet.     Engage in 2 hours and 30 minutes per week of moderate-intensity physical activity, or 1 hour and 15 minutes (75 minutes) per week of vigorous-intensity aerobic physical activity, or an equivalent combination of moderate and vigorous-intensity aerobic physical activity. Aerobic activity should be performed in episodes of at least 10 minutes preferably spread throughout the week.     Adhering to a  heart healthy diet, regular exercise habits, avoidance of tobacco products, and maintenance of a healthy weight are crucial components of their heart disease risk reduction.     Any positive review of systems not specifically addressed in the office visit today should be evaluated and treated by the patients primary care physician or in an emergency department if necessary     Patient was notified that results from ordered tests will be called to the patient if it changes current management; it will otherwise be discussed at a future appointment and available on [facility].     Thank you for allowing me to participate in the care of this patient.        This document was generated using the assistance of voice recognition software. If there are any errors of spelling, grammar, syntax, or meaning; please feel free to contact me directly for clarification.    Past Medical History:  He has a past medical history of Arrhythmia, Clotting disorder (CMS/[facility]), Coronary artery disease involving native coronary artery of native heart (10/20/2023), Disease of thyroid gland, Hypertension, Personal history of diseases of the blood and blood-forming organs and certain disorders involving the immune mechanism (10/19/2021), and Personal history of diseases of the blood and blood-forming organs and certain disorders involving the immune mechanism (10/19/2021).    He has no past medical history of AAA (abdominal aortic aneurysm) (CMS/[facility]), Asthma, Cancer (CMS/[facility]), Carotid artery occlusion, CHF (congestive heart failure) (CMS/[facility]), Chronic kidney disease, Diabetes mellitus (CMS/HCC), Heart murmur, Hyperlipidemia, Stroke (CMS/[facility]), or Syncope.    Past Surgical History:  He has a past surgical history that includes Cardiac catheterization (N/A, 10/13/2023).      Social History:  He reports that he has quit smoking. His smoking use included cigarettes. He has a 20.00 pack-year smoking history. He has never been exposed to tobacco smoke.  "He has never used smokeless tobacco. He reports that he does not currently use alcohol. He reports that he does not currently use drugs.    Family History:  No family history on file.     Allergies:  Patient has no known allergies.    Outpatient Medications:  Current Outpatient Medications   Medication Instructions    alpha tocopherol (Vitamin E) 268 mg (400 unit) capsule oral    ascorbic acid (Vitamin C) 1,000 mg tablet 1 tablet, oral, Daily    cholecalciferol (Vitamin D-3) 50 MCG (2000 UT) tablet 1 tablet, oral, Daily    dilTIAZem (CARDIZEM) 60 mg, oral, Daily    folic acid (Folvite) 800 mcg tablet 1 tablet, oral, Daily    furosemide (LASIX) 40 mg, oral, Daily    iodine, bulk, crystals USE AS DIRECTED.    ketoconazole (NIZOral) 2 % cream     lisinopril 40 mg, oral, Daily    magnesium oxide (Mag-Ox) 400 mg tablet 1 tablet, oral, Daily    nystatin (Mycostatin) 100,000 unit/gram powder Topical, 2 times daily    selenium 200 mcg tablet 1 tablet, oral, Daily    silver sulfADIAZINE (Silvadene) 1 % cream APPLY AND RUB IN A THIN FILM TO AFFECTED AREAS TWICE DAILY.(AM AND PM).    spironolactone (ALDACTONE) 25 mg, oral, 2 times daily    syringe with needle (BD Luer-Juliette Syringe) 3 mL 25 gauge x 1\" syringe 1 mL, miscellaneous, Every 14 days, Using syringe. 2 per month.    testosterone cypionate (DEPO-TESTOSTERONE) 200 mg, intramuscular, Every 14 days    triamcinolone (Kenalog) 0.1 % cream APPLY 2-3 TIMES DAILY TO AFFECTED AREA(S).    warfarin (Coumadin) 4 mg tablet TAKE 1 TABLET EVERY DAY    warfarin (COUMADIN) 0.5 mg, oral, Daily, Last dose 10/8     zinc gluconate 100 mg, oral, Daily        ROS:  A 14 point review of systems was done and is negative other than as stated in HPI    Vitals:      10/13/2023    12:30 PM 10/13/2023     1:00 PM 10/13/2023     1:35 PM 10/13/2023     2:00 PM 10/13/2023     2:15 PM 10/17/2023    12:42 PM 10/20/2023     1:51 PM   Vitals   Systolic 192 176 188 195 177 158 160   Diastolic 103 102 95 111 " "91 81 92   Heart Rate 89 88 87 89 95 90 88   Resp 18 18   16 22    Height (in)       1.778 m (5' 10\")   Weight (lb)       371   BMI       53.23 kg/m2   BSA (m2)       2.88 m2        Physical Exam:   Constitutional: Cooperative, in no acute distress, alert, appears stated age.   Skin: Skin color, texture, turgor normal. No rashes or lesions.   Head: Normocephalic. No masses, lesions, tenderness or abnormalities   Eyes: Extraocular movements are grossly intact.   Mouth and throat: Mucous membranes moist   Neck: Neck supple, no carotid bruits, no JVD   Respiratory: Lungs clear to auscultation, no wheezing or rhonchi, no use of accessory muscles   Chest wall: No scars, normal excursion with respiration   Cardiovascular: Regular rhythm without murmur  Gastrointestinal: Abdomen soft, nontender. Bowel sounds normal. Morbidly obese.  Musculoskeletal: Strength equal in upper extremities   Extremities: 1+ pitting edema  Neurologic: Sensation grossly intact, alert and oriented ×3     Intake/Output:   No intake/output data recorded.    Outpatient Medications  Current Outpatient Medications on File Prior to Visit   Medication Sig Dispense Refill    alpha tocopherol (Vitamin E) 268 mg (400 unit) capsule Take by mouth.      ascorbic acid (Vitamin C) 1,000 mg tablet Take 1 tablet (1,000 mg) by mouth once daily.      cholecalciferol (Vitamin D-3) 50 MCG (2000 UT) tablet Take 1 tablet (2,000 Units) by mouth once daily.      dilTIAZem (Cardizem) 60 mg immediate release tablet Take 1 tablet (60 mg) by mouth once daily. 90 tablet 3    folic acid (Folvite) 800 mcg tablet Take 1 tablet (800 mcg) by mouth once daily.      furosemide (Lasix) 40 mg tablet Take 1 tablet (40 mg) by mouth once daily. 90 tablet 1    iodine, bulk, crystals USE AS DIRECTED.      ketoconazole (NIZOral) 2 % cream       lisinopril 40 mg tablet Take 1 tablet (40 mg) by mouth once daily.      magnesium oxide (Mag-Ox) 400 mg tablet Take 1 tablet (400 mg) by mouth once " "daily.      nystatin (Mycostatin) 100,000 unit/gram powder Apply topically 2 times a day. 60 g 2    selenium 200 mcg tablet Take 1 tablet (200,000 mcg) by mouth once daily.      silver sulfADIAZINE (Silvadene) 1 % cream APPLY AND RUB IN A THIN FILM TO AFFECTED AREAS TWICE DAILY.(AM AND PM). 85 g 0    spironolactone (Aldactone) 25 mg tablet Take 1 tablet (25 mg) by mouth 2 times a day. 180 tablet 1    syringe with needle (BD Luer-Juliette Syringe) 3 mL 25 gauge x 1\" syringe 1 mL every 14 (fourteen) days. Using syringe. 2 per month. 2 each 5    testosterone cypionate (Depo-Testosterone) 200 mg/mL injection Inject 1 mL (200 mg) into the shoulder, thigh, or buttocks every 14 (fourteen) days. 2 mL 5    triamcinolone (Kenalog) 0.1 % cream APPLY 2-3 TIMES DAILY TO AFFECTED AREA(S). 80 g 0    warfarin (Coumadin) 1 mg tablet Take 0.5 tablets (0.5 mg) by mouth once daily. Last dose 10/8      warfarin (Coumadin) 4 mg tablet TAKE 1 TABLET EVERY DAY 30 tablet 0    zinc gluconate 100 mg tablet Take 1 tablet (100 mg) by mouth once daily.  0    [DISCONTINUED] warfarin (Coumadin) 4 mg tablet TAKE 1 TABLET EVERY DAY (Patient taking differently: Take 1 tablet (4 mg) by mouth 1 time. 10/8 last dose) 30 tablet 0     No current facility-administered medications on file prior to visit.       Labs: (past 26 weeks)  Recent Results (from the past 4368 hour(s))   Comprehensive Metabolic Panel    Collection Time: 06/27/23  2:00 PM   Result Value Ref Range    Glucose 124 (H) 74 - 99 mg/dL    Sodium 138 136 - 145 mmol/L    Potassium 4.1 3.5 - 5.3 mmol/L    Chloride 103 98 - 107 mmol/L    Bicarbonate 29 21 - 32 mmol/L    Anion Gap 10 10 - 20 mmol/L    Urea Nitrogen 12 6 - 23 mg/dL    Creatinine 0.76 0.50 - 1.30 mg/dL    GFR MALE >90 >90 mL/min/1.73m2    Calcium 9.3 8.6 - 10.3 mg/dL    Albumin 4.0 3.4 - 5.0 g/dL    Alkaline Phosphatase 44 33 - 136 U/L    Total Protein 7.3 6.4 - 8.2 g/dL    AST 23 9 - 39 U/L    Total Bilirubin 0.5 0.0 - 1.2 mg/dL    ALT " (SGPT) 31 10 - 52 U/L   CBC    Collection Time: 06/27/23  2:00 PM   Result Value Ref Range    WBC 5.1 4.4 - 11.3 x10E9/L    RBC 4.51 4.50 - 5.90 x10E12/L    Hemoglobin 12.5 (L) 13.5 - 17.5 g/dL    Hematocrit 41.7 41.0 - 52.0 %    MCV 92 80 - 100 fL    MCHC 30.0 (L) 32.0 - 36.0 g/dL    Platelets 219 150 - 450 x10E9/L    RDW 18.5 (H) 11.5 - 14.5 %   POCT INR manually resulted    Collection Time: 07/17/23  8:28 AM   Result Value Ref Range    POC INR 1.5 (A) 0.9 - 1.1   Renal Function Panel    Collection Time: 08/01/23  3:13 PM   Result Value Ref Range    Glucose 99 74 - 99 mg/dL    Sodium 140 136 - 145 mmol/L    Potassium 4.3 3.5 - 5.3 mmol/L    Chloride 104 98 - 107 mmol/L    Bicarbonate 29 21 - 32 mmol/L    Anion Gap 11 10 - 20 mmol/L    Urea Nitrogen 14 6 - 23 mg/dL    Creatinine 0.86 0.50 - 1.30 mg/dL    GFR MALE >90 >90 mL/min/1.73m2    Calcium 9.3 8.6 - 10.3 mg/dL    Phosphorus 3.5 2.5 - 4.9 mg/dL    Albumin 4.1 3.4 - 5.0 g/dL   Hemoglobin A1C    Collection Time: 08/21/23 10:09 AM   Result Value Ref Range    Hemoglobin A1C 6.2 (A) %    Estimated Average Glucose 131 MG/DL   CBC and Auto Differential    Collection Time: 08/21/23 10:09 AM   Result Value Ref Range    WBC 6.2 4.4 - 11.3 x10E9/L    RBC 4.42 (L) 4.50 - 5.90 x10E12/L    Hemoglobin 12.3 (L) 13.5 - 17.5 g/dL    Hematocrit 41.5 41.0 - 52.0 %    MCV 94 80 - 100 fL    MCHC 29.6 (L) 32.0 - 36.0 g/dL    Platelets 217 150 - 450 x10E9/L    RDW 17.2 (H) 11.5 - 14.5 %    Neutrophils % 61.4 40.0 - 80.0 %    Immature Granulocytes %, Automated 0.2 0.0 - 0.9 %    Lymphocytes % 21.8 13.0 - 44.0 %    Monocytes % 11.8 2.0 - 10.0 %    Eosinophils % 4.2 0.0 - 6.0 %    Basophils % 0.6 0.0 - 2.0 %    Neutrophils Absolute 3.79 1.20 - 7.70 x10E9/L    Lymphocytes Absolute 1.35 1.20 - 4.80 x10E9/L    Monocytes Absolute 0.73 0.10 - 1.00 x10E9/L    Eosinophils Absolute 0.26 0.00 - 0.70 x10E9/L    Basophils Absolute 0.04 0.00 - 0.10 x10E9/L   Comprehensive Metabolic Panel     Collection Time: 08/21/23 10:09 AM   Result Value Ref Range    Glucose 116 (H) 74 - 99 mg/dL    Sodium 138 136 - 145 mmol/L    Potassium 4.6 3.5 - 5.3 mmol/L    Chloride 104 98 - 107 mmol/L    Bicarbonate 27 21 - 32 mmol/L    Anion Gap 12 10 - 20 mmol/L    Urea Nitrogen 16 6 - 23 mg/dL    Creatinine 0.75 0.50 - 1.30 mg/dL    GFR MALE >90 >90 mL/min/1.73m2    Calcium 9.3 8.6 - 10.3 mg/dL    Albumin 4.0 3.4 - 5.0 g/dL    Alkaline Phosphatase 43 33 - 136 U/L    Total Protein 6.9 6.4 - 8.2 g/dL    AST 26 9 - 39 U/L    Total Bilirubin 0.5 0.0 - 1.2 mg/dL    ALT (SGPT) 28 10 - 52 U/L   Testosterone    Collection Time: 08/21/23 10:09 AM   Result Value Ref Range    Testosterone 96 (L) 240 - 1000 ng/dL   Testosterone    Collection Time: 09/05/23  8:07 AM   Result Value Ref Range    Testosterone 75 (L) 240 - 1000 ng/dL   POCT INR manually resulted    Collection Time: 09/05/23  3:34 PM   Result Value Ref Range    POC INR 4.0 (A) 0.9 - 1.1   Tissue/Wound Culture/Smear    Collection Time: 09/13/23 11:40 AM    Specimen: Tissue/Wound    L 2ND TOE   Result Value Ref Range    Gram Stain       2+ GRANULOCYTES.~4+ GRAM (-) BACILLI.  2+ GRAM (+) COCCI    Tissue/Wound Culture/Smear (A)        4+~AEROBIC MIXED BACTERIA~   ANAEROBIC MIXED BACTERIA    Tissue/Wound Culture/Smear Streptococcus, group C (A)    Basic Metabolic Panel    Collection Time: 10/04/23  9:39 AM   Result Value Ref Range    Glucose 119 (H) 74 - 99 mg/dL    Sodium 137 136 - 145 mmol/L    Potassium 4.4 3.5 - 5.3 mmol/L    Chloride 102 98 - 107 mmol/L    Bicarbonate 26 21 - 32 mmol/L    Anion Gap 13 10 - 20 mmol/L    Urea Nitrogen 12 6 - 23 mg/dL    Creatinine 0.76 0.50 - 1.30 mg/dL    eGFR >90 >60 mL/min/1.73m*2    Calcium 9.6 8.6 - 10.3 mg/dL   CBC    Collection Time: 10/04/23  9:39 AM   Result Value Ref Range    WBC 5.6 4.4 - 11.3 x10*3/uL    nRBC 0.0 0.0 - 0.0 /100 WBCs    RBC 4.26 (L) 4.50 - 5.90 x10*6/uL    Hemoglobin 12.1 (L) 13.5 - 17.5 g/dL    Hematocrit 39.4 (L)  41.0 - 52.0 %    MCV 93 80 - 100 fL    MCH 28.4 26.0 - 34.0 pg    MCHC 30.7 (L) 32.0 - 36.0 g/dL    RDW 16.4 (H) 11.5 - 14.5 %    Platelets 225 150 - 450 x10*3/uL    MPV 9.6 7.5 - 11.5 fL   POCT PT/INR    Collection Time: 10/13/23  7:38 AM   Result Value Ref Range    POCT Prothrombin time 22.0 seconds    POCT INR 1.8    Basic Metabolic Panel    Collection Time: 10/23/23  9:09 AM   Result Value Ref Range    Glucose 116 (H) 74 - 99 mg/dL    Sodium 137 136 - 145 mmol/L    Potassium 4.4 3.5 - 5.3 mmol/L    Chloride 99 98 - 107 mmol/L    Bicarbonate 25 21 - 32 mmol/L    Anion Gap 17 10 - 20 mmol/L    Urea Nitrogen 14 6 - 23 mg/dL    Creatinine 0.81 0.50 - 1.30 mg/dL    eGFR >90 >60 mL/min/1.73m*2    Calcium 9.4 8.6 - 10.3 mg/dL   B-Type Natriuretic Peptide    Collection Time: 10/23/23  9:09 AM   Result Value Ref Range    BNP 12 0 - 99 pg/mL   Magnesium    Collection Time: 10/23/23  9:09 AM   Result Value Ref Range    Magnesium 2.05 1.60 - 2.40 mg/dL       ECG  Encounter Date: 10/20/23   ECG 12 Lead    Narrative    Sinus rhythm heart rate 88 bpm.       Echocardiogram  No results found for this or any previous visit from the past 1095 days.      CV Studies:  EKG:  Encounter Date: 10/20/23   ECG 12 Lead    Narrative    Sinus rhythm heart rate 88 bpm.     Echocardiogram:   Echocardi[place] [address]  Phone [phone] Fax [phone]    TRANSTHORACIC ECHOCARDIOGRAM REPORT      Patient Name:     POLINA Stacy Physician:   [id number] Latricia Moseley MD  Study Date:       9/25/2023     Referring Physician: FLOR VALLADARES  MRN/PID:          [id number]      PCP:  Accession/Order#: [id number]  Department Location: [facility]  YOB: 1960     Fellow:  Gender:           M             Nurse:               Malu Freeman RN  Admit Date:                     Sonographer:         Ronda Mae RDCS  Admission Status: Outpatient     Additional Staff:  Height:           177.80 cm     CC Report to:  Weight:           169.19 kg     Study Type:          Echocardiogram  BSA:              2.72 m2  Blood Pressure: 170 /84 mmHg    Diagnosis/ICD: I10-Essential (primary) hypertension; R06.02-Shortness of breath  Indication:    Dyspnea on Exertion  Procedure/CPT: Echo Complete w Full Doppler-[id number]    Patient History:  Pertinent History: HTN.    Study Detail: The following Echo studies were performed: 2D, M-Mode, Doppler and  color flow. Technically challenging study due to body habitus and  prominent lung artifact. Optison used as a contrast agent for  endocardial border definition. Total contrast used for this  procedure was 3 mL via IV push.      PHYSICIAN INTERPRETATION:  Left Ventricle: Left ventricular systolic function is normal, with an estimated ejection fraction of 60-65%. There are no regional wall motion abnormalities. The left ventricular cavity size is normal. Spectral Doppler shows an impaired relaxation pattern of left ventricular diastolic filling.  Left Atrium: The left atrium is normal in size.  Right Ventricle: The right ventricle is normal in size. There is normal right ventricular global systolic function.  Right Atrium: The right atrium is normal in size.  Aortic Valve: The aortic valve was not well visualized. There is no evidence of aortic valve regurgitation. The peak instantaneous gradient of the aortic valve is 12.7 mmHg. The mean gradient of the aortic valve is 6.9 mmHg.  Mitral Valve: The mitral valve is normal in structure. There is no evidence of mitral valve regurgitation.  Tricuspid Valve: The tricuspid valve is structurally normal. No evidence of tricuspid regurgitation.  Pulmonic Valve: The pulmonic valve is structurally normal. There is no indication of pulmonic valve regurgitation.  Pericardium: There is no pericardial effusion noted.  Aorta: The aortic root is normal.      CONCLUSIONS:  1. Left ventricular systolic  function is normal with a 60-65% estimated ejection fraction.  2. Poorly visualized anatomical structures due to suboptimal image quality.  3. Spectral Doppler shows an impaired relaxation pattern of left ventricular diastolic filling.    QUANTITATIVE DATA SUMMARY:  2D MEASUREMENTS:  Normal Ranges:  LAs:           4.45 cm   (2.7-4.0cm)  IVSd:          0.75 cm   (0.6-1.1cm)  LVPWd:         0.82 cm   (0.6-1.1cm)  LVIDd:         5.83 cm   (3.9-5.9cm)  LVIDs:         4.67 cm  LV Mass Index: 63.6 g/m2  LV % FS        19.9 %    LA VOLUME:  Normal Ranges:  LA Vol A4C:        79.5 ml    (22+/-6mL/m2)  LA Vol A2C:        67.4 ml  LA Vol BP:         77.9 ml  LA Vol Index A4C:  29.2 ml/m2  LA Vol Index A2C:  24.8 ml/m2  LA Vol Index BP:   28.6 ml/m2  LA Area A4C:       25.4 cm2  LA Area A2C:       22.0 cm2  LA Major Axis A4C: 6.9 cm  LA Major Axis A2C: 6.1 cm  LA Volume Index:   28.6 ml/m2  LA Vol A4C:        74.6 ml  LA Vol A2C:        60.4 ml    RA VOLUME BY A/L METHOD:  Normal Ranges:  RA Area A4C: 14.9 cm2    AORTA MEASUREMENTS:  Normal Ranges:  Ao Sinus, d: 3.50 cm (2.1-3.5cm)  Ao STJ, d:   2.70 cm (1.7-3.4cm)  Asc Ao, d:   3.50 cm (2.1-3.4cm)    LV SYSTOLIC FUNCTION BY 2D PLANIMETRY (MOD):  Normal Ranges:  EF-A4C View: 64.4 % (>=55%)  EF-A2C View: 57.6 %  EF-Biplane:  59.7 %    LV DIASTOLIC FUNCTION:  Normal Ranges:  MV Peak E:    0.82 m/s    (0.7-1.2 m/s)  MV Peak A:    1.13 m/s    (0.42-0.7 m/s)  E/A Ratio:    0.73        (1.0-2.2)  MV e'         0.10 m/s    (>8.0)  MV lateral e' 0.11 m/s  MV medial e'  0.10 m/s  MV A Dur:     131.30 msec  E/e' Ratio:   7.85        (<8.0)    MITRAL VALVE:  Normal Ranges:  MV DT: 205 msec (150-240msec)    AORTIC VALVE:  Normal Ranges:  AoV Vmax:                1.78 m/s  (<=1.7m/s)  AoV Peak P.7 mmHg (<20mmHg)  AoV Mean P.9 mmHg  (1.7-11.5mmHg)  LVOT Max Franck:            1.02 m/s  (<=1.1m/s)  AoV VTI:                 35.30 cm  (18-25cm)  LVOT VTI:                 21.21 cm  LVOT Diameter:           2.22 cm   (1.8-2.4cm)  AoV Area, VTI:           2.32 cm2  (2.5-5.5cm2)  AoV Area,Vmax:           2.21 cm2  (2.5-4.5cm2)  AoV Dimensionless Index: 0.60      RIGHT VENTRICLE:  RV Basal 3.00 cm  RV Mid   2.40 cm  RV Major 8.4 cm  TAPSE:   22.2 mm  RV s'    0.13 m/s    AORTA:  Asc Ao Diam 3.54 cm      [id number] Latricia Moseley MD  Electronically signed on 9/26/2023 at 11:37:05 AM         Final     Stress Testing IMGRESULT([id number]): No results found for this or any previous visit from the past 1825 days.    Cardiac Catheterization: No results found for this or any previous visit from the past 1825 days.  No results found for this or any previous visit from the past 3650 days.     Cardiac Scoring:   CT heart calcium scoring wo IV contrast 12/05/2022    Narrative  MRN: [id number]  Patient Name: POLINA MCCORMACK    STUDY:  CT CARDIAC SCORING;  12/5/2022 10:25 am    INDICATION:  cardiac screening  Z13.6: Screening for cardiovascular condition.    COMPARISON:  None.    ACCESSION NUMBER(S):  [id number]    ORDERING CLINICIAN:  ALYSON RAMIREZ    TECHNIQUE:  Using prospective ECG gating, CT scan of the coronary arteries was  performed without intravenous contrast. Coronary calcium scoring  was  performed according to the method of Agatston.    FINDINGS:  The score and distribution of calcium in the coronary arteries is as  follows:    LM 0,  LAD 20.27,  LCx 0,  RCA 0,    Total 20.27    The visualized mid/lower ascending thoracic aorta measures 3.7 cm in  diameter. The heart is normal in size. No pericardial effusion is  present.    No gross evidence of mediastinal or hilar lymphadenopathy or masses  is identified. The visualized segments of the lungs are normally  expanded.    The visualized subdiaphragmatic structures appear intact. Fatty liver.    Impression  1. Coronary artery calcium score of 20.27*.  2. Fatty liver.    *Coronary artery calcium scoring may be helpful in predicting  "the  risk for future coronary heart disease events.  According to the  American College of Cardiology Foundation Clinical Expert Consensus  Task Force, such testing provides important prognostic information in  patients with more than one coronary heart disease risk factor. The  coronary artery calcium score correlates with the annual risk of a  non-fatal myocardial infarction or coronary heart disease death.    Coronary artery score            Annual Risk    0-99                             0.4%  100-399                        1.3%  >400                            2.4%    These three \"breakpoints\" correspond to lower, intermediate and high  risk states for future coronary events.  Such information should be  used, along with appropriate clinical judgment, to make decisions  regarding the intensity of risk factor management strategies to treat  blood lipids and to modify other non-lipid coronary risk factors.    Reference: Deerfield P et al. Circulation.  2007; 115:402-426    AAA : No results found for this or any previous visit from the past 1825 days.    OTHER: No results found for this or any previous visit from the past 1825 days.    LAST IMAGING RESULTS  ECG 12 Lead  Sinus rhythm heart rate 88 bpm.    Problem List Items Addressed This Visit       HTN (hypertension)    SOBOE (shortness of breath on exertion)    Chronic fatigue    Daytime somnolence    Morbid obesity (CMS/HCC)    Coronary artery disease involving native coronary artery of native heart - Primary    (HFpEF) heart failure with preserved ejection fraction (CMS/HCC)    Other chest pain    Sleep apnea    Preoperative cardiovascular examination         Gorge Medeiros DO, FACC, FACOI  "

## 2023-10-30 ENCOUNTER — OFFICE VISIT (OUTPATIENT)
Dept: CARDIOLOGY | Facility: CLINIC | Age: 63
End: 2023-10-30
Payer: COMMERCIAL

## 2023-10-30 VITALS
WEIGHT: 315 LBS | SYSTOLIC BLOOD PRESSURE: 132 MMHG | BODY MASS INDEX: 45.1 KG/M2 | HEIGHT: 70 IN | HEART RATE: 91 BPM | DIASTOLIC BLOOD PRESSURE: 86 MMHG

## 2023-10-30 DIAGNOSIS — G47.30 SLEEP APNEA, UNSPECIFIED TYPE: ICD-10-CM

## 2023-10-30 DIAGNOSIS — R07.89 OTHER CHEST PAIN: ICD-10-CM

## 2023-10-30 DIAGNOSIS — I10 PRIMARY HYPERTENSION: ICD-10-CM

## 2023-10-30 DIAGNOSIS — Z01.810 PREOPERATIVE CARDIOVASCULAR EXAMINATION: ICD-10-CM

## 2023-10-30 DIAGNOSIS — R06.02 SOBOE (SHORTNESS OF BREATH ON EXERTION): ICD-10-CM

## 2023-10-30 DIAGNOSIS — I25.118 CORONARY ARTERY DISEASE OF NATIVE ARTERY OF NATIVE HEART WITH STABLE ANGINA PECTORIS (CMS-HCC): Primary | ICD-10-CM

## 2023-10-30 DIAGNOSIS — E66.01 MORBID OBESITY (MULTI): ICD-10-CM

## 2023-10-30 DIAGNOSIS — I50.32 CHRONIC HEART FAILURE WITH PRESERVED EJECTION FRACTION (MULTI): ICD-10-CM

## 2023-10-30 DIAGNOSIS — R40.0 DAYTIME SOMNOLENCE: ICD-10-CM

## 2023-10-30 DIAGNOSIS — R53.82 CHRONIC FATIGUE: ICD-10-CM

## 2023-10-30 PROCEDURE — 93000 ELECTROCARDIOGRAM COMPLETE: CPT | Performed by: INTERNAL MEDICINE

## 2023-10-30 PROCEDURE — 1036F TOBACCO NON-USER: CPT | Performed by: INTERNAL MEDICINE

## 2023-10-30 PROCEDURE — 99213 OFFICE O/P EST LOW 20 MIN: CPT | Performed by: INTERNAL MEDICINE

## 2023-10-30 PROCEDURE — 3044F HG A1C LEVEL LT 7.0%: CPT | Performed by: INTERNAL MEDICINE

## 2023-10-30 PROCEDURE — 3075F SYST BP GE 130 - 139MM HG: CPT | Performed by: INTERNAL MEDICINE

## 2023-10-30 PROCEDURE — 3008F BODY MASS INDEX DOCD: CPT | Performed by: INTERNAL MEDICINE

## 2023-10-30 PROCEDURE — 4010F ACE/ARB THERAPY RXD/TAKEN: CPT | Performed by: INTERNAL MEDICINE

## 2023-10-30 PROCEDURE — 3079F DIAST BP 80-89 MM HG: CPT | Performed by: INTERNAL MEDICINE

## 2023-11-01 ENCOUNTER — OFFICE VISIT (OUTPATIENT)
Dept: VASCULAR SURGERY | Facility: CLINIC | Age: 63
End: 2023-11-01
Payer: COMMERCIAL

## 2023-11-01 VITALS
BODY MASS INDEX: 52.52 KG/M2 | DIASTOLIC BLOOD PRESSURE: 80 MMHG | WEIGHT: 315 LBS | SYSTOLIC BLOOD PRESSURE: 138 MMHG | HEART RATE: 91 BPM

## 2023-11-01 DIAGNOSIS — I87.2 VENOUS INSUFFICIENCY: Primary | ICD-10-CM

## 2023-11-01 DIAGNOSIS — I87.2 CHRONIC VENOUS STASIS DERMATITIS OF BOTH LOWER EXTREMITIES: ICD-10-CM

## 2023-11-01 PROCEDURE — 99213 OFFICE O/P EST LOW 20 MIN: CPT | Performed by: INTERNAL MEDICINE

## 2023-11-01 PROCEDURE — 3008F BODY MASS INDEX DOCD: CPT | Performed by: INTERNAL MEDICINE

## 2023-11-01 PROCEDURE — 3079F DIAST BP 80-89 MM HG: CPT | Performed by: INTERNAL MEDICINE

## 2023-11-01 PROCEDURE — 1036F TOBACCO NON-USER: CPT | Performed by: INTERNAL MEDICINE

## 2023-11-01 PROCEDURE — 3075F SYST BP GE 130 - 139MM HG: CPT | Performed by: INTERNAL MEDICINE

## 2023-11-01 PROCEDURE — 4010F ACE/ARB THERAPY RXD/TAKEN: CPT | Performed by: INTERNAL MEDICINE

## 2023-11-01 PROCEDURE — 3044F HG A1C LEVEL LT 7.0%: CPT | Performed by: INTERNAL MEDICINE

## 2023-11-01 ASSESSMENT — ENCOUNTER SYMPTOMS
GASTROINTESTINAL NEGATIVE: 1
CONSTITUTIONAL NEGATIVE: 1
PSYCHIATRIC NEGATIVE: 1
NEUROLOGICAL NEGATIVE: 1
MUSCULOSKELETAL NEGATIVE: 1
WOUND: 1
EYES NEGATIVE: 1
HEMATOLOGIC/LYMPHATIC NEGATIVE: 1
RESPIRATORY NEGATIVE: 1
ALLERGIC/IMMUNOLOGIC NEGATIVE: 1
ENDOCRINE NEGATIVE: 1

## 2023-11-01 NOTE — PROGRESS NOTES
Subjective   Patient ID: Hugo Gauthier is a 62 y.o. male who presents for New Patient Visit (NPV- venous stasis dermatitis/).  HPI  62 year old male with a past medical history of obesity, afib, cellulitis, back pain, chronic venous stasis dermatitis and HTN that presents to the office for a follow up for venous insufficiency. During last visit, had recommended a CT abd/pelvis to rule out May-thurners. Had planned on performing a venogram. It was recommended to get a cardiac clearance prior to diagnostic vascular testing. He had a recent heart cath that showed minimal non obstructive coronary artery disease in a right dominant system.    He is currently going to the wound center, on and off the past 2 years for chronic venous stasis. He has an open wound on his left lower leg- is going to the wound center every month. He does have bilateral swelling, achiness and heaviness.He had recent vascular testing that shows venous insufficiency. He does wear compression stockings with no relief of swelling. CEAP 6      Vascular testing:  CT abd/pelvis 8/23: No stigmata of May-Thurner syndrome   Venous insufficiency reflux ultrasound 5/15/23: deep venous reflux and Pulsatile bilaterally.   PVR 5/15/23: No evidence of arterial occlusive disease in the right lower extremity at rest. Left pressures of >220 mmHg suggest no compressibility of vessels and may make absolute Segmental Limb Pressures (SLP) unreliable.    Review of Systems   Constitutional: Negative.    HENT: Negative.     Eyes: Negative.    Respiratory: Negative.     Cardiovascular:  Positive for leg swelling.   Gastrointestinal: Negative.    Endocrine: Negative.    Genitourinary: Negative.    Musculoskeletal: Negative.    Skin:  Positive for wound.   Allergic/Immunologic: Negative.    Neurological: Negative.    Hematological: Negative.    Psychiatric/Behavioral: Negative.           Objective   Physical Exam  Constitutional:       Appearance: He is obese.   Eyes:       Pupils: Pupils are equal, round, and reactive to light.   Cardiovascular:      Rate and Rhythm: Normal rate.   Pulmonary:      Effort: Pulmonary effort is normal.   Abdominal:      General: Bowel sounds are normal.   Musculoskeletal:         General: Swelling present.      Cervical back: Normal range of motion.   Skin:     General: Skin is warm and dry.      Capillary Refill: Capillary refill takes less than 2 seconds.   Neurological:      General: No focal deficit present.      Mental Status: He is alert.   Psychiatric:         Mood and Affect: Mood normal.         Assessment/Plan   62 year old male with a past medical history of obesity, afib, cellulitis, back pain, chronic venous stasis dermatitis and HTN that presents to the office for a follow up for venous insufficiency.     Plan:  CEAP 6, Has chronic venous insufficiency. He is symptomatic, complaining of leg swelling, achiness, has wounds on left lower leg. Is going to the wound center. Does wear compression stockings with no relief of symptoms.  Recommend a venogram- dicussed procedure and associated risks such infection, bleeding and DVT. He will proceed with procedure  Was recently evaluated per Cardiology- had a recent cardiac cath- showed non-obstructive CAD- no intervention. Will obtain cardiac clearance prior to procedure

## 2023-11-06 ENCOUNTER — TELEPHONE (OUTPATIENT)
Dept: VASCULAR SURGERY | Facility: CLINIC | Age: 63
End: 2023-11-06
Payer: COMMERCIAL

## 2023-11-06 NOTE — TELEPHONE ENCOUNTER
RE: schedule surgery  Received: Today  DO Jazz Tavera LPN  opened          Previous Messages       ----- Message -----  From: Jazz Cooper LPN  Sent: 11/3/2023   3:10 PM EST  To: Franky Camejo DO; Jazz Cooper LPN  Subject: FW: schedule surgery                            Please initiate venogram for 11/20      ----- Message -----  From: Jazz Cooper LPN  Sent: 10/10/2023   2:15 PM EDT  To: Jazz Cooper LPN  Subject: schedule surgery                                Venogram w/ IVUS    Needs cardiology clearance (walked over 8/25 Dr. Medeiros)    Heart cath 10/13/23  FUV with Dr. Medeiros 10/20/23         Comments    PROCEDURE 11/20/23   FUV 12/4/23 11AM   PER DR. CAMEJO, CARDIOLOGY CLEARANCE NOT NEEDED, RECENT HEART CATH

## 2023-11-08 ENCOUNTER — TELEMEDICINE (OUTPATIENT)
Dept: NEUROLOGY | Facility: HOSPITAL | Age: 63
End: 2023-11-08
Payer: COMMERCIAL

## 2023-11-08 DIAGNOSIS — E66.01 MORBID OBESITY (MULTI): ICD-10-CM

## 2023-11-08 DIAGNOSIS — G47.33 OSA (OBSTRUCTIVE SLEEP APNEA): Primary | ICD-10-CM

## 2023-11-08 DIAGNOSIS — G47.31 CENTRAL SLEEP APNEA: ICD-10-CM

## 2023-11-08 DIAGNOSIS — G47.61 PERIODIC LIMB MOVEMENTS OF SLEEP: ICD-10-CM

## 2023-11-08 DIAGNOSIS — D50.0 ANEMIA DUE TO BLOOD LOSS: ICD-10-CM

## 2023-11-08 DIAGNOSIS — G47.33 OBSTRUCTIVE SLEEP APNEA SYNDROME: ICD-10-CM

## 2023-11-08 PROCEDURE — 99214 OFFICE O/P EST MOD 30 MIN: CPT | Mod: 95 | Performed by: PSYCHIATRY & NEUROLOGY

## 2023-11-08 PROCEDURE — 99204 OFFICE O/P NEW MOD 45 MIN: CPT | Performed by: PSYCHIATRY & NEUROLOGY

## 2023-11-08 NOTE — LETTER
November 8, 2023     Gorge Medeiros DO  [address] [facility] [address]    Patient: Hugo Gauthier   YOB: 1960   Date of Visit: 11/8/2023       Dear Dr. Gorge Medeiros DO:    Thank you for referring Hugo Gauthier to me for evaluation. Below are my notes for this consultation.  If you have questions, please do not hesitate to call me. I look forward to following your patient along with you.       Sincerely,     Gerald Hess MD      CC: Edilma Luna MD  ______________________________________________________________________________________    Telehealth visit    Visit type: provider referral: Dr Medeiros    PCP: Edilma Luna MD.    Subjective    Hugo Gauthier is a 63 y.o. year old right handed male who presents with Sleep Apnea.    Patient is accompanied by: spouse.     Telehealth visit today. The patient was informed about the telehealth clinical encounter including benefits to avoiding travel, limitations of the assessment, and billing for the service. In-office care may be recommended if needed. Telehealth sessions are not being recorded and personal health information is protected. All questions were answered and verbal consent from the patient (or guardian) was obtained to proceed. Patient verbally confirmed, patient physically in [place].     HPI    States he was having some chronic wound issues in . Saw vascular surgery, who referred him to see cardiologist Dr Medeiros. Among other things, he ordered sleep study. Sleep study done, pt referred here.    Tired/sleepy during the daytime. Obese. Able to sleep supine only per pt due to habitus.    Usual bedtime: when working, 9p  Usual SOL: 5 min  Once asleep, get up to use bathroom 2-3x, no trouble going back to sleep  Usual wake up time: 4a    Can fall asleep while seated at edge of bed sometimes  (+) drowsy driving    Had 1 sleep study done. States he didn't sleep well. When travelling in past usually cannot sleep well first night in the  hotel.    9/28/23 Split night PSG ([place], BMI 56.7, ESS 23), all-supine, no optimal pressure up to CPAP 20 cm H20 (all hypopneas), switched to biPAP, centrals emerged (last obs ap at EPAP 14 cm H20--? mixed apnea); no optimal pressure noted. (+) PLMs noted.    Used nasal pillows.    Pt states leg movements sometimes a problem sometimes not during sleep.    Former smoker. No alcohol/no street drug use.    Review of Systems   Constitutional:  Negative for appetite change, chills and fever.   HENT:  Negative for ear pain and nosebleeds.    Eyes:  Negative for discharge and itching.   Respiratory:  Negative for choking and chest tightness.    Cardiovascular:  Negative for chest pain and palpitations.   Gastrointestinal:  Negative for abdominal distention, abdominal pain and nausea.   Endocrine: Negative for cold intolerance and heat intolerance.   Genitourinary:  Negative for difficulty urinating and dysuria.   Musculoskeletal:  Negative for gait problem and myalgias.   Neurological:  Negative for dizziness, seizures and numbness.     Patient Active Problem List   Diagnosis   • Atrial fibrillation (CMS/HCC)   • Anemia due to blood loss   • Chronic venous stasis dermatitis of both lower extremities   • History of hyperthyroidism   • HTN (hypertension)   • Chronic lower back pain   • Lesion of lower extremity   • Lymphedema   • Onychomycosis of toenail   • Ulcer, venous stasis (CMS/HCC)   • Diet-controlled diabetes mellitus (CMS/HCC)   • Class 3 severe obesity due to excess calories with serious comorbidity and body mass index (BMI) of 50.0 to 59.9 in adult (CMS/HCC)   • Other fatigue   • Hypogonadism male   • SOBOE (shortness of breath on exertion)   • Chronic fatigue   • Daytime somnolence   • Morbid obesity (CMS/HCC)   • Urinary frequency   • Tinea cruris   • Test anxiety   • Poison ivy dermatitis   • Nevus of scalp   • May-Thurner syndrome   • Actinic keratosis   • Cutaneous horn   • Dysuria   • Blood in urine   •  Abnormal nuclear stress test   • History of iron deficiency anemia   • Anticoagulated by anticoagulation treatment   • Coronary artery disease involving native coronary artery of native heart   • (HFpEF) heart failure with preserved ejection fraction (CMS/HCC)   • Other chest pain   • RADHA (obstructive sleep apnea)   • Preoperative cardiovascular examination   • Central sleep apnea   • Periodic limb movements of sleep       Past Medical History:   Diagnosis Date   • Arrhythmia    • Clotting disorder (CMS/HCC)    • Coronary artery disease involving native coronary artery of native heart 10/20/2023   • Disease of thyroid gland    • Hypertension    • Personal history of diseases of the blood and blood-forming organs and certain disorders involving the immune mechanism 10/19/2021    History of iron deficiency anemia   • Personal history of diseases of the blood and blood-forming organs and certain disorders involving the immune mechanism 10/19/2021    History of anemia     Past Surgical History:   Procedure Laterality Date   • CARDIAC CATHETERIZATION N/A 10/13/2023    Procedure: Left Heart Cath;  Surgeon: Jude Lamas MD;  Location: [facility] Cardiac Cath Lab;  Service: Cardiovascular;  Laterality: N/A;  PACE ORDERING / MORALES DOING     Social History     Tobacco Use   • Smoking status: Former     Packs/day: 1.00     Years: 20.00     Additional pack years: 0.00     Total pack years: 20.00     Types: Cigarettes     Passive exposure: Never   • Smokeless tobacco: Never   Substance Use Topics   • Alcohol use: Not Currently     family history is not on file.    No Known Allergies    Current Outpatient Medications:   •  alpha tocopherol (Vitamin E) 268 mg (400 unit) capsule, Take by mouth., Disp: , Rfl:   •  ascorbic acid (Vitamin C) 1,000 mg tablet, Take 1 tablet (1,000 mg) by mouth once daily., Disp: , Rfl:   •  cholecalciferol (Vitamin D-3) 50 MCG (2000 UT) tablet, Take 1 tablet (2,000 Units) by mouth once daily., Disp: , Rfl:  "  •  dilTIAZem (Cardizem) 60 mg immediate release tablet, Take 1 tablet (60 mg) by mouth once daily., Disp: 90 tablet, Rfl: 3  •  folic acid (Folvite) 800 mcg tablet, Take 1 tablet (800 mcg) by mouth once daily., Disp: , Rfl:   •  furosemide (Lasix) 40 mg tablet, Take 1 tablet (40 mg) by mouth once daily., Disp: 90 tablet, Rfl: 1  •  iodine, bulk, crystals, USE AS DIRECTED., Disp: , Rfl:   •  ketoconazole (NIZOral) 2 % cream, , Disp: , Rfl:   •  lisinopril 40 mg tablet, Take 1 tablet (40 mg) by mouth once daily., Disp: , Rfl:   •  magnesium oxide (Mag-Ox) 400 mg tablet, Take 1 tablet (400 mg) by mouth once daily., Disp: , Rfl:   •  nystatin (Mycostatin) 100,000 unit/gram powder, Apply topically 2 times a day., Disp: 60 g, Rfl: 2  •  selenium 200 mcg tablet, Take 1 tablet (200,000 mcg) by mouth once daily., Disp: , Rfl:   •  silver sulfADIAZINE (Silvadene) 1 % cream, APPLY AND RUB IN A THIN FILM TO AFFECTED AREAS TWICE DAILY.(AM AND PM)., Disp: 85 g, Rfl: 0  •  spironolactone (Aldactone) 25 mg tablet, Take 1 tablet (25 mg) by mouth 2 times a day., Disp: 180 tablet, Rfl: 1  •  testosterone cypionate (Depo-Testosterone) 200 mg/mL injection, Inject 1 mL (200 mg) into the shoulder, thigh, or buttocks every 14 (fourteen) days., Disp: 2 mL, Rfl: 5  •  warfarin (Coumadin) 1 mg tablet, Take 0.5 tablets (0.5 mg) by mouth once daily. Last dose 10/8, Disp: , Rfl:   •  warfarin (Coumadin) 4 mg tablet, TAKE 1 TABLET EVERY DAY, Disp: 30 tablet, Rfl: 0  •  zinc gluconate 100 mg tablet, Take 1 tablet (100 mg) by mouth once daily., Disp: , Rfl: 0  •  syringe with needle (BD Luer-Juliette Syringe) 3 mL 25 gauge x 1\" syringe, 1 mL every 14 (fourteen) days. Using syringe. 2 per month., Disp: 2 each, Rfl: 5    Objective    Vital Signs:  None--telehealth visit    Awake, alert, oriented x3, in no distress  Well-nourished, seated    Mental status exam as above, conversant   Fair fund of knowledge  Recent/remote memory fair  Fair attention " span  No facial droop   Hearing grossly intact   No dysarthria    I did not have him stand or walk    No ferritin level.  Last Hba1c 6 1/17/23    Assessment/Plan  Problem List Items Addressed This Visit             ICD-10-CM    Anemia due to blood loss D50.0    Morbid obesity (CMS/[facility]) E66.01    RADHA (obstructive sleep apnea) - Primary G47.33     Split night PSG done 9/2023 ([place], BMI 56.7, ESS 23), all-supine, no optimal pressure up to CPAP 20 cm H20 (all hypopneas), switched to biPAP, centrals emerged (last obs ap at EPAP 14 cm H20--? mixed apnea); no optimal pressure noted. (+) PLMs noted.  I discussed the pathophysiology of RADHA, including the cardiometabolic and neurocognitive sequelae of untreated RADHA.  Also discussed about risk factors for RADHA, including weight, gender, aging, and airway anatomy.  In general, weight gain worsens the severity of RADHA, and weight loss improves RADHA.   Suboptimal titration  I recommend getting a repeat PAP titration to evaluate for optimal pressure. Unfortunately, pt unable to sleep non-supine (due to habitus) limits chances of successful titration given morbid obesity and severity of RADHA.   I did emphasize the need for evaluation and following through with therapy, if needed, given the history.   The patient has been adequately advised not to drive when sleepy.          Central sleep apnea G47.31    Periodic limb movements of sleep G47.61     Not quite symptomatic per pt          Plans:  Repeat PAP titration study--start at CPAP 14 cm H20 and titrate, switch to bilevel PAP if needed at EPAP 10 cm H20, switch to bilevel PAP S/T if needed  Check ferritin  Weight loss    Rtc after testing    All questions were answered.  Pt knows how to contact my office in case pt has any questions or concerns.    Gerald Hess MD

## 2023-11-08 NOTE — ASSESSMENT & PLAN NOTE
Split night PSG done 9/2023 ([place], BMI 56.7, ESS 23), all-supine, no optimal pressure up to CPAP 20 cm H20 (all hypopneas), switched to biPAP, centrals emerged (last obs ap at EPAP 14 cm H20--? mixed apnea); no optimal pressure noted. (+) PLMs noted.  I discussed the pathophysiology of RADHA, including the cardiometabolic and neurocognitive sequelae of untreated RADHA.  Also discussed about risk factors for RADHA, including weight, gender, aging, and airway anatomy.  In general, weight gain worsens the severity of RADHA, and weight loss improves RADHA.   Suboptimal titration  I recommend getting a repeat PAP titration to evaluate for optimal pressure. Unfortunately, pt unable to sleep non-supine (due to habitus) limits chances of successful titration given morbid obesity and severity of RADHA.   I did emphasize the need for evaluation and following through with therapy, if needed, given the history.   The patient has been adequately advised not to drive when sleepy.

## 2023-11-08 NOTE — PROGRESS NOTES
Telehealth visit    Visit type: provider referral: Dr Medeiros    PCP: Edilma Luna MD.    Subjective     Hugo Gauthier is a 63 y.o. year old right handed male who presents with Sleep Apnea.    Patient is accompanied by: spouse.     Telehealth visit today. The patient was informed about the telehealth clinical encounter including benefits to avoiding travel, limitations of the assessment, and billing for the service. In-office care may be recommended if needed. Telehealth sessions are not being recorded and personal health information is protected. All questions were answered and verbal consent from the patient (or guardian) was obtained to proceed. Patient verbally confirmed, patient physically in [place].     HPI    States he was having some chronic wound issues in . Saw vascular surgery, who referred him to see cardiologist Dr Medeiros. Among other things, he ordered sleep study. Sleep study done, pt referred here.    Tired/sleepy during the daytime. Obese. Able to sleep supine only per pt due to habitus.    Usual bedtime: when working, 9p  Usual SOL: 5 min  Once asleep, get up to use bathroom 2-3x, no trouble going back to sleep  Usual wake up time: 4a    Can fall asleep while seated at edge of bed sometimes  (+) drowsy driving    Had 1 sleep study done. States he didn't sleep well. When travelling in past usually cannot sleep well first night in the hotel.    9/28/23 Split night PSG ([place], BMI 56.7, ESS 23), all-supine, no optimal pressure up to CPAP 20 cm H20 (all hypopneas), switched to biPAP, centrals emerged (last obs ap at EPAP 14 cm H20--? mixed apnea); no optimal pressure noted. (+) PLMs noted.    Used nasal pillows.    Pt states leg movements sometimes a problem sometimes not during sleep.    Former smoker. No alcohol/no street drug use.    Review of Systems   Constitutional:  Negative for appetite change, chills and fever.   HENT:  Negative for ear pain and nosebleeds.    Eyes:  Negative for discharge  and itching.   Respiratory:  Negative for choking and chest tightness.    Cardiovascular:  Negative for chest pain and palpitations.   Gastrointestinal:  Negative for abdominal distention, abdominal pain and nausea.   Endocrine: Negative for cold intolerance and heat intolerance.   Genitourinary:  Negative for difficulty urinating and dysuria.   Musculoskeletal:  Negative for gait problem and myalgias.   Neurological:  Negative for dizziness, seizures and numbness.     Patient Active Problem List   Diagnosis    Atrial fibrillation (CMS/[facility])    Anemia due to blood loss    Chronic venous stasis dermatitis of both lower extremities    History of hyperthyroidism    HTN (hypertension)    Chronic lower back pain    Lesion of lower extremity    Lymphedema    Onychomycosis of toenail    Ulcer, venous stasis (CMS/[facility])    Diet-controlled diabetes mellitus (CMS/[facility])    Class 3 severe obesity due to excess calories with serious comorbidity and body mass index (BMI) of 50.0 to 59.9 in adult (CMS/[facility])    Other fatigue    Hypogonadism male    SOBOE (shortness of breath on exertion)    Chronic fatigue    Daytime somnolence    Morbid obesity (CMS/[facility])    Urinary frequency    Tinea cruris    Test anxiety    Poison ivy dermatitis    Nevus of scalp    May-Thurner syndrome    Actinic keratosis    Cutaneous horn    Dysuria    Blood in urine    Abnormal nuclear stress test    History of iron deficiency anemia    Anticoagulated by anticoagulation treatment    Coronary artery disease involving native coronary artery of native heart    (HFpEF) heart failure with preserved ejection fraction (CMS/[facility])    Other chest pain    RADHA (obstructive sleep apnea)    Preoperative cardiovascular examination    Central sleep apnea    Periodic limb movements of sleep       Past Medical History:   Diagnosis Date    Arrhythmia     Clotting disorder (CMS/[facility])     Coronary artery disease involving native coronary artery of native heart 10/20/2023    Disease of  thyroid gland     Hypertension     Personal history of diseases of the blood and blood-forming organs and certain disorders involving the immune mechanism 10/19/2021    History of iron deficiency anemia    Personal history of diseases of the blood and blood-forming organs and certain disorders involving the immune mechanism 10/19/2021    History of anemia     Past Surgical History:   Procedure Laterality Date    CARDIAC CATHETERIZATION N/A 10/13/2023    Procedure: Left Heart Cath;  Surgeon: Jude Lamas MD;  Location: [facility];  Service: Cardiovascular;  Laterality: N/A;  PACE ORDERING / MORALES DOING     Social History     Tobacco Use    Smoking status: Former     Packs/day: 1.00     Years: 20.00     Additional pack years: 0.00     Total pack years: 20.00     Types: Cigarettes     Passive exposure: Never    Smokeless tobacco: Never   Substance Use Topics    Alcohol use: Not Currently     family history is not on file.    No Known Allergies    Current Outpatient Medications:     alpha tocopherol (Vitamin E) 268 mg (400 unit) capsule, Take by mouth., Disp: , Rfl:     ascorbic acid (Vitamin C) 1,000 mg tablet, Take 1 tablet (1,000 mg) by mouth once daily., Disp: , Rfl:     cholecalciferol (Vitamin D-3) 50 MCG (2000 UT) tablet, Take 1 tablet (2,000 Units) by mouth once daily., Disp: , Rfl:     dilTIAZem (Cardizem) 60 mg immediate release tablet, Take 1 tablet (60 mg) by mouth once daily., Disp: 90 tablet, Rfl: 3    folic acid (Folvite) 800 mcg tablet, Take 1 tablet (800 mcg) by mouth once daily., Disp: , Rfl:     furosemide (Lasix) 40 mg tablet, Take 1 tablet (40 mg) by mouth once daily., Disp: 90 tablet, Rfl: 1    iodine, bulk, crystals, USE AS DIRECTED., Disp: , Rfl:     ketoconazole (NIZOral) 2 % cream, , Disp: , Rfl:     lisinopril 40 mg tablet, Take 1 tablet (40 mg) by mouth once daily., Disp: , Rfl:     magnesium oxide (Mag-Ox) 400 mg tablet, Take 1 tablet (400 mg) by mouth once daily., Disp: , Rfl:  "    nystatin (Mycostatin) 100,000 unit/gram powder, Apply topically 2 times a day., Disp: 60 g, Rfl: 2    selenium 200 mcg tablet, Take 1 tablet (200,000 mcg) by mouth once daily., Disp: , Rfl:     silver sulfADIAZINE (Silvadene) 1 % cream, APPLY AND RUB IN A THIN FILM TO AFFECTED AREAS TWICE DAILY.(AM AND PM)., Disp: 85 g, Rfl: 0    spironolactone (Aldactone) 25 mg tablet, Take 1 tablet (25 mg) by mouth 2 times a day., Disp: 180 tablet, Rfl: 1    testosterone cypionate (Depo-Testosterone) 200 mg/mL injection, Inject 1 mL (200 mg) into the shoulder, thigh, or buttocks every 14 (fourteen) days., Disp: 2 mL, Rfl: 5    warfarin (Coumadin) 1 mg tablet, Take 0.5 tablets (0.5 mg) by mouth once daily. Last dose 10/8, Disp: , Rfl:     warfarin (Coumadin) 4 mg tablet, TAKE 1 TABLET EVERY DAY, Disp: 30 tablet, Rfl: 0    zinc gluconate 100 mg tablet, Take 1 tablet (100 mg) by mouth once daily., Disp: , Rfl: 0    syringe with needle (BD Luer-Juliette Syringe) 3 mL 25 gauge x 1\" syringe, 1 mL every 14 (fourteen) days. Using syringe. 2 per month., Disp: 2 each, Rfl: 5    Objective     Vital Signs:  None--telehealth visit    Awake, alert, oriented x3, in no distress  Well-nourished, seated    Mental status exam as above, conversant   Fair fund of knowledge  Recent/remote memory fair  Fair attention span  No facial droop   Hearing grossly intact   No dysarthria    I did not have him stand or walk    No ferritin level.  Last Hba1c 6 1/17/23    Assessment/Plan   Problem List Items Addressed This Visit             ICD-10-CM    Anemia due to blood loss D50.0    Morbid obesity (CMS/HCC) E66.01    RADHA (obstructive sleep apnea) - Primary G47.33     Split night PSG done 9/2023 ([place], BMI 56.7, ESS 23), all-supine, no optimal pressure up to CPAP 20 cm H20 (all hypopneas), switched to biPAP, centrals emerged (last obs ap at EPAP 14 cm H20--? mixed apnea); no optimal pressure noted. (+) PLMs noted.  I discussed the pathophysiology of RADHA, " including the cardiometabolic and neurocognitive sequelae of untreated RADHA.  Also discussed about risk factors for RADHA, including weight, gender, aging, and airway anatomy.  In general, weight gain worsens the severity of RADHA, and weight loss improves RADHA.   Suboptimal titration  I recommend getting a repeat PAP titration to evaluate for optimal pressure. Unfortunately, pt unable to sleep non-supine (due to habitus) limits chances of successful titration given morbid obesity and severity of RADHA.   I did emphasize the need for evaluation and following through with therapy, if needed, given the history.   The patient has been adequately advised not to drive when sleepy.          Central sleep apnea G47.31    Periodic limb movements of sleep G47.61     Not quite symptomatic per pt          Plans:  Repeat PAP titration study--start at CPAP 14 cm H20 and titrate, switch to bilevel PAP if needed at EPAP 10 cm H20, switch to bilevel PAP S/T if needed  Check ferritin  Weight loss    Rtc after testing    All questions were answered.  Pt knows how to contact my office in case pt has any questions or concerns.    Gerald Hess MD

## 2023-11-08 NOTE — PATIENT INSTRUCTIONS
Repeat PAP titration study--start at CPAP 14 cm H20 and titrate, switch to bilevel PAP if needed at EPAP 10 cm H20, switch to bilevel PAP S/T if needed  Check ferritin  Weight loss    Rtc after testing

## 2023-11-09 ENCOUNTER — ANESTHESIA EVENT (OUTPATIENT)
Dept: OPERATING ROOM | Facility: HOSPITAL | Age: 63
End: 2023-11-09
Payer: COMMERCIAL

## 2023-11-13 ENCOUNTER — LAB (OUTPATIENT)
Dept: LAB | Facility: LAB | Age: 63
End: 2023-11-13
Payer: COMMERCIAL

## 2023-11-13 ENCOUNTER — OFFICE VISIT (OUTPATIENT)
Dept: WOUND CARE | Facility: CLINIC | Age: 63
End: 2023-11-13
Payer: COMMERCIAL

## 2023-11-13 DIAGNOSIS — D50.0 ANEMIA DUE TO BLOOD LOSS: ICD-10-CM

## 2023-11-13 LAB — FERRITIN SERPL-MCNC: 160 NG/ML (ref 20–300)

## 2023-11-13 PROCEDURE — 11042 DBRDMT SUBQ TIS 1ST 20SQCM/<: CPT | Performed by: CLINICAL NURSE SPECIALIST

## 2023-11-13 PROCEDURE — 82728 ASSAY OF FERRITIN: CPT

## 2023-11-13 PROCEDURE — 99213 OFFICE O/P EST LOW 20 MIN: CPT | Performed by: CLINICAL NURSE SPECIALIST

## 2023-11-13 PROCEDURE — 36415 COLL VENOUS BLD VENIPUNCTURE: CPT

## 2023-11-13 PROCEDURE — 11045 DBRDMT SUBQ TISS EACH ADDL: CPT

## 2023-11-13 PROCEDURE — 11045 DBRDMT SUBQ TISS EACH ADDL: CPT | Performed by: CLINICAL NURSE SPECIALIST

## 2023-11-13 PROCEDURE — 11042 DBRDMT SUBQ TIS 1ST 20SQCM/<: CPT

## 2023-11-14 ENCOUNTER — TELEPHONE (OUTPATIENT)
Dept: PRIMARY CARE | Facility: CLINIC | Age: 63
End: 2023-11-14
Payer: COMMERCIAL

## 2023-11-14 NOTE — TELEPHONE ENCOUNTER
Hugo is calling to check on the paperwork he dropped off last Friday. Is it done & Faxed?  Please call back    He is requesting a larger chair in the waiting room. He doesn't fit in the chairs and the loveseats are too low to the ground

## 2023-11-15 ENCOUNTER — TELEPHONE (OUTPATIENT)
Dept: PRIMARY CARE | Facility: CLINIC | Age: 63
End: 2023-11-15
Payer: COMMERCIAL

## 2023-11-15 DIAGNOSIS — I48.11 LONGSTANDING PERSISTENT ATRIAL FIBRILLATION (MULTI): Primary | ICD-10-CM

## 2023-11-15 NOTE — TELEPHONE ENCOUNTER
He is asking if short term disability paper work has been filled out ?    Also asking if he can get standing order to have INR checked

## 2023-11-17 NOTE — TELEPHONE ENCOUNTER
Hugo called back--told him form was completed    After form is faxed on Monday/ please make copy--Licha (wife) will  copy on Monday

## 2023-11-20 ENCOUNTER — HOSPITAL ENCOUNTER (OUTPATIENT)
Facility: HOSPITAL | Age: 63
Setting detail: OUTPATIENT SURGERY
Discharge: HOME | End: 2023-11-20
Attending: SURGERY | Admitting: SURGERY
Payer: COMMERCIAL

## 2023-11-20 ENCOUNTER — TELEPHONE (OUTPATIENT)
Dept: PRIMARY CARE | Facility: CLINIC | Age: 63
End: 2023-11-20

## 2023-11-20 ENCOUNTER — PHARMACY VISIT (OUTPATIENT)
Dept: PHARMACY | Facility: CLINIC | Age: 63
End: 2023-11-20

## 2023-11-20 ENCOUNTER — APPOINTMENT (OUTPATIENT)
Dept: WOUND CARE | Facility: CLINIC | Age: 63
End: 2023-11-20
Payer: COMMERCIAL

## 2023-11-20 ENCOUNTER — APPOINTMENT (OUTPATIENT)
Dept: RADIOLOGY | Facility: HOSPITAL | Age: 63
End: 2023-11-20
Payer: COMMERCIAL

## 2023-11-20 ENCOUNTER — APPOINTMENT (OUTPATIENT)
Dept: PRIMARY CARE | Facility: CLINIC | Age: 63
End: 2023-11-20
Payer: COMMERCIAL

## 2023-11-20 ENCOUNTER — ANESTHESIA (OUTPATIENT)
Dept: OPERATING ROOM | Facility: HOSPITAL | Age: 63
End: 2023-11-20
Payer: COMMERCIAL

## 2023-11-20 ENCOUNTER — TELEPHONE (OUTPATIENT)
Dept: SLEEP MEDICINE | Facility: CLINIC | Age: 63
End: 2023-11-20

## 2023-11-20 VITALS
TEMPERATURE: 97.6 F | DIASTOLIC BLOOD PRESSURE: 89 MMHG | HEART RATE: 95 BPM | SYSTOLIC BLOOD PRESSURE: 152 MMHG | OXYGEN SATURATION: 95 % | RESPIRATION RATE: 18 BRPM

## 2023-11-20 DIAGNOSIS — B85.3 PUBIC LICE: Primary | ICD-10-CM

## 2023-11-20 DIAGNOSIS — I87.1 ILIAC VEIN STENOSIS, RIGHT: Primary | ICD-10-CM

## 2023-11-20 LAB
ANION GAP SERPL CALC-SCNC: 12 MMOL/L (ref 10–20)
BUN SERPL-MCNC: 17 MG/DL (ref 6–23)
CALCIUM SERPL-MCNC: 9.4 MG/DL (ref 8.6–10.3)
CHLORIDE SERPL-SCNC: 100 MMOL/L (ref 98–107)
CO2 SERPL-SCNC: 28 MMOL/L (ref 21–32)
CREAT SERPL-MCNC: 0.8 MG/DL (ref 0.5–1.3)
ERYTHROCYTE [DISTWIDTH] IN BLOOD BY AUTOMATED COUNT: 18.2 % (ref 11.5–14.5)
GFR SERPL CREATININE-BSD FRML MDRD: >90 ML/MIN/1.73M*2
GLUCOSE SERPL-MCNC: 117 MG/DL (ref 74–99)
HCT VFR BLD AUTO: 42.9 % (ref 41–52)
HGB BLD-MCNC: 12.6 G/DL (ref 13.5–17.5)
MCH RBC QN AUTO: 26.3 PG (ref 26–34)
MCHC RBC AUTO-ENTMCNC: 29.4 G/DL (ref 32–36)
MCV RBC AUTO: 89 FL (ref 80–100)
NRBC BLD-RTO: 0 /100 WBCS (ref 0–0)
PLATELET # BLD AUTO: 257 X10*3/UL (ref 150–450)
POTASSIUM SERPL-SCNC: 4.6 MMOL/L (ref 3.5–5.3)
RBC # BLD AUTO: 4.8 X10*6/UL (ref 4.5–5.9)
SODIUM SERPL-SCNC: 135 MMOL/L (ref 136–145)
WBC # BLD AUTO: 8.2 X10*3/UL (ref 4.4–11.3)

## 2023-11-20 PROCEDURE — 7100000001 HC RECOVERY ROOM TIME - INITIAL BASE CHARGE: Performed by: SURGERY

## 2023-11-20 PROCEDURE — 76000 FLUOROSCOPY <1 HR PHYS/QHP: CPT

## 2023-11-20 PROCEDURE — 2720000007 HC OR 272 NO HCPCS: Performed by: SURGERY

## 2023-11-20 PROCEDURE — 3600000004 HC OR TIME - INITIAL BASE CHARGE - PROCEDURE LEVEL FOUR: Performed by: SURGERY

## 2023-11-20 PROCEDURE — C1753 CATH, INTRAVAS ULTRASOUND: HCPCS | Performed by: SURGERY

## 2023-11-20 PROCEDURE — 80048 BASIC METABOLIC PNL TOTAL CA: CPT | Performed by: SURGERY

## 2023-11-20 PROCEDURE — C1876 STENT, NON-COA/NON-COV W/DEL: HCPCS | Performed by: SURGERY

## 2023-11-20 PROCEDURE — 37252 INTRVASC US NONCORONARY 1ST: CPT | Performed by: SURGERY

## 2023-11-20 PROCEDURE — 75822 VEIN X-RAY ARMS/LEGS: CPT | Performed by: SURGERY

## 2023-11-20 PROCEDURE — 7100000002 HC RECOVERY ROOM TIME - EACH INCREMENTAL 1 MINUTE: Performed by: SURGERY

## 2023-11-20 PROCEDURE — 3600000009 HC OR TIME - EACH INCREMENTAL 1 MINUTE - PROCEDURE LEVEL FOUR: Performed by: SURGERY

## 2023-11-20 PROCEDURE — 7100000010 HC PHASE TWO TIME - EACH INCREMENTAL 1 MINUTE: Performed by: SURGERY

## 2023-11-20 PROCEDURE — C1725 CATH, TRANSLUMIN NON-LASER: HCPCS | Performed by: SURGERY

## 2023-11-20 PROCEDURE — 76937 US GUIDE VASCULAR ACCESS: CPT | Performed by: SURGERY

## 2023-11-20 PROCEDURE — 2500000001 HC RX 250 WO HCPCS SELF ADMINISTERED DRUGS (ALT 637 FOR MEDICARE OP): Performed by: SURGERY

## 2023-11-20 PROCEDURE — 3700000001 HC GENERAL ANESTHESIA TIME - INITIAL BASE CHARGE: Performed by: SURGERY

## 2023-11-20 PROCEDURE — 7100000009 HC PHASE TWO TIME - INITIAL BASE CHARGE: Performed by: SURGERY

## 2023-11-20 PROCEDURE — RXMED WILLOW AMBULATORY MEDICATION CHARGE

## 2023-11-20 PROCEDURE — 2500000004 HC RX 250 GENERAL PHARMACY W/ HCPCS (ALT 636 FOR OP/ED): Performed by: NURSE ANESTHETIST, CERTIFIED REGISTERED

## 2023-11-20 PROCEDURE — 2500000004 HC RX 250 GENERAL PHARMACY W/ HCPCS (ALT 636 FOR OP/ED): Performed by: SURGERY

## 2023-11-20 PROCEDURE — 3700000002 HC GENERAL ANESTHESIA TIME - EACH INCREMENTAL 1 MINUTE: Performed by: SURGERY

## 2023-11-20 PROCEDURE — 37238 OPEN/PERQ PLACE STENT SAME: CPT | Performed by: SURGERY

## 2023-11-20 PROCEDURE — 37253 INTRVASC US NONCORONARY ADDL: CPT | Performed by: SURGERY

## 2023-11-20 PROCEDURE — 36415 COLL VENOUS BLD VENIPUNCTURE: CPT | Performed by: SURGERY

## 2023-11-20 PROCEDURE — 2780000003 HC OR 278 NO HCPCS: Performed by: SURGERY

## 2023-11-20 PROCEDURE — 2500000004 HC RX 250 GENERAL PHARMACY W/ HCPCS (ALT 636 FOR OP/ED): Performed by: ANESTHESIOLOGY

## 2023-11-20 PROCEDURE — C1769 GUIDE WIRE: HCPCS | Performed by: SURGERY

## 2023-11-20 PROCEDURE — 85027 COMPLETE CBC AUTOMATED: CPT | Performed by: SURGERY

## 2023-11-20 PROCEDURE — 2550000001 HC RX 255 CONTRASTS: Performed by: SURGERY

## 2023-11-20 PROCEDURE — 94760 N-INVAS EAR/PLS OXIMETRY 1: CPT | Mod: 59

## 2023-11-20 DEVICE — STENT AB9U18120090 ABRE V01
Type: IMPLANTABLE DEVICE | Site: VEIN | Status: FUNCTIONAL
Brand: ABRE™

## 2023-11-20 RX ORDER — LIDOCAINE HYDROCHLORIDE 10 MG/ML
0.1 INJECTION, SOLUTION EPIDURAL; INFILTRATION; INTRACAUDAL; PERINEURAL ONCE
Status: DISCONTINUED | OUTPATIENT
Start: 2023-11-20 | End: 2023-11-20 | Stop reason: HOSPADM

## 2023-11-20 RX ORDER — PROPOFOL 10 MG/ML
INJECTION, EMULSION INTRAVENOUS CONTINUOUS PRN
Status: DISCONTINUED | OUTPATIENT
Start: 2023-11-20 | End: 2023-11-20

## 2023-11-20 RX ORDER — DIPHENHYDRAMINE HYDROCHLORIDE 50 MG/ML
12.5 INJECTION INTRAMUSCULAR; INTRAVENOUS ONCE AS NEEDED
Status: DISCONTINUED | OUTPATIENT
Start: 2023-11-20 | End: 2023-11-20 | Stop reason: HOSPADM

## 2023-11-20 RX ORDER — HEPARIN SODIUM 5000 [USP'U]/ML
INJECTION, SOLUTION INTRAVENOUS; SUBCUTANEOUS AS NEEDED
Status: DISCONTINUED | OUTPATIENT
Start: 2023-11-20 | End: 2023-11-20

## 2023-11-20 RX ORDER — LABETALOL HYDROCHLORIDE 5 MG/ML
5 INJECTION, SOLUTION INTRAVENOUS ONCE AS NEEDED
Status: DISCONTINUED | OUTPATIENT
Start: 2023-11-20 | End: 2023-11-20 | Stop reason: HOSPADM

## 2023-11-20 RX ORDER — MIDAZOLAM HYDROCHLORIDE 1 MG/ML
INJECTION, SOLUTION INTRAMUSCULAR; INTRAVENOUS AS NEEDED
Status: DISCONTINUED | OUTPATIENT
Start: 2023-11-20 | End: 2023-11-20

## 2023-11-20 RX ORDER — CLOPIDOGREL BISULFATE 75 MG/1
75 TABLET ORAL DAILY
Status: DISCONTINUED | OUTPATIENT
Start: 2023-11-21 | End: 2023-11-20 | Stop reason: HOSPADM

## 2023-11-20 RX ORDER — FAMOTIDINE 10 MG/ML
20 INJECTION INTRAVENOUS ONCE
Status: COMPLETED | OUTPATIENT
Start: 2023-11-20 | End: 2023-11-20

## 2023-11-20 RX ORDER — IVERMECTIN 3 MG/1
12 TABLET ORAL
Qty: 4 TABLET | Refills: 1 | Status: SHIPPED | OUTPATIENT
Start: 2023-11-20 | End: 2023-12-07

## 2023-11-20 RX ORDER — OXYCODONE AND ACETAMINOPHEN 5; 325 MG/1; MG/1
1 TABLET ORAL EVERY 4 HOURS PRN
Status: DISCONTINUED | OUTPATIENT
Start: 2023-11-20 | End: 2023-11-20 | Stop reason: HOSPADM

## 2023-11-20 RX ORDER — DROPERIDOL 2.5 MG/ML
0.62 INJECTION, SOLUTION INTRAMUSCULAR; INTRAVENOUS ONCE AS NEEDED
Status: DISCONTINUED | OUTPATIENT
Start: 2023-11-20 | End: 2023-11-20 | Stop reason: HOSPADM

## 2023-11-20 RX ORDER — METOCLOPRAMIDE HYDROCHLORIDE 5 MG/ML
INJECTION INTRAMUSCULAR; INTRAVENOUS AS NEEDED
Status: DISCONTINUED | OUTPATIENT
Start: 2023-11-20 | End: 2023-11-20

## 2023-11-20 RX ORDER — HYDRALAZINE HYDROCHLORIDE 20 MG/ML
5 INJECTION INTRAMUSCULAR; INTRAVENOUS EVERY 30 MIN PRN
Status: DISCONTINUED | OUTPATIENT
Start: 2023-11-20 | End: 2023-11-20 | Stop reason: HOSPADM

## 2023-11-20 RX ORDER — ONDANSETRON HYDROCHLORIDE 2 MG/ML
4 INJECTION, SOLUTION INTRAVENOUS ONCE AS NEEDED
Status: DISCONTINUED | OUTPATIENT
Start: 2023-11-20 | End: 2023-11-20 | Stop reason: HOSPADM

## 2023-11-20 RX ORDER — MORPHINE SULFATE 2 MG/ML
2 INJECTION, SOLUTION INTRAMUSCULAR; INTRAVENOUS EVERY 5 MIN PRN
Status: DISCONTINUED | OUTPATIENT
Start: 2023-11-20 | End: 2023-11-20 | Stop reason: HOSPADM

## 2023-11-20 RX ORDER — SODIUM CHLORIDE, SODIUM LACTATE, POTASSIUM CHLORIDE, CALCIUM CHLORIDE 600; 310; 30; 20 MG/100ML; MG/100ML; MG/100ML; MG/100ML
100 INJECTION, SOLUTION INTRAVENOUS CONTINUOUS
Status: DISCONTINUED | OUTPATIENT
Start: 2023-11-20 | End: 2023-11-20 | Stop reason: HOSPADM

## 2023-11-20 RX ORDER — PROPOFOL 10 MG/ML
INJECTION, EMULSION INTRAVENOUS AS NEEDED
Status: DISCONTINUED | OUTPATIENT
Start: 2023-11-20 | End: 2023-11-20

## 2023-11-20 RX ORDER — FENTANYL CITRATE 50 UG/ML
INJECTION, SOLUTION INTRAMUSCULAR; INTRAVENOUS AS NEEDED
Status: DISCONTINUED | OUTPATIENT
Start: 2023-11-20 | End: 2023-11-20

## 2023-11-20 RX ORDER — ONDANSETRON HYDROCHLORIDE 2 MG/ML
INJECTION, SOLUTION INTRAVENOUS AS NEEDED
Status: DISCONTINUED | OUTPATIENT
Start: 2023-11-20 | End: 2023-11-20

## 2023-11-20 RX ORDER — CLOPIDOGREL BISULFATE 75 MG/1
75 TABLET ORAL DAILY
Qty: 30 TABLET | Refills: 0 | Status: SHIPPED | OUTPATIENT
Start: 2023-11-20 | End: 2024-01-30 | Stop reason: WASHOUT

## 2023-11-20 RX ORDER — CEFAZOLIN SODIUM 2 G/100ML
2 INJECTION, SOLUTION INTRAVENOUS ONCE
Status: COMPLETED | OUTPATIENT
Start: 2023-11-20 | End: 2023-11-20

## 2023-11-20 RX ORDER — IODIXANOL 270 MG/ML
INJECTION, SOLUTION INTRAVASCULAR AS NEEDED
Status: DISCONTINUED | OUTPATIENT
Start: 2023-11-20 | End: 2023-11-20 | Stop reason: HOSPADM

## 2023-11-20 RX ORDER — IVERMECTIN 3 MG/1
12 TABLET ORAL
Qty: 4 TABLET | Refills: 1 | Status: SHIPPED | OUTPATIENT
Start: 2023-11-20 | End: 2023-11-20 | Stop reason: SDUPTHER

## 2023-11-20 RX ORDER — ALBUTEROL SULFATE 0.83 MG/ML
2.5 SOLUTION RESPIRATORY (INHALATION) ONCE AS NEEDED
Status: DISCONTINUED | OUTPATIENT
Start: 2023-11-20 | End: 2023-11-20 | Stop reason: HOSPADM

## 2023-11-20 RX ORDER — GLYCOPYRROLATE 0.2 MG/ML
INJECTION INTRAMUSCULAR; INTRAVENOUS AS NEEDED
Status: DISCONTINUED | OUTPATIENT
Start: 2023-11-20 | End: 2023-11-20

## 2023-11-20 RX ORDER — CLOPIDOGREL BISULFATE 300 MG/1
300 TABLET, FILM COATED ORAL ONCE
Status: COMPLETED | OUTPATIENT
Start: 2023-11-20 | End: 2023-11-20

## 2023-11-20 RX ADMIN — METOCLOPRAMIDE 5 MG: 5 INJECTION, SOLUTION INTRAMUSCULAR; INTRAVENOUS at 10:46

## 2023-11-20 RX ADMIN — PROPOFOL 50 MG: 10 INJECTION, EMULSION INTRAVENOUS at 10:49

## 2023-11-20 RX ADMIN — PROPOFOL 50 MG: 10 INJECTION, EMULSION INTRAVENOUS at 10:46

## 2023-11-20 RX ADMIN — MIDAZOLAM HYDROCHLORIDE 2 MG: 1 INJECTION, SOLUTION INTRAMUSCULAR; INTRAVENOUS at 10:43

## 2023-11-20 RX ADMIN — CLOPIDOGREL BISULFATE 300 MG: 300 TABLET, FILM COATED ORAL at 13:41

## 2023-11-20 RX ADMIN — METOCLOPRAMIDE 5 MG: 5 INJECTION, SOLUTION INTRAMUSCULAR; INTRAVENOUS at 10:56

## 2023-11-20 RX ADMIN — HYDROMORPHONE HYDROCHLORIDE 0.5 MG: 0.5 INJECTION, SOLUTION INTRAMUSCULAR; INTRAVENOUS; SUBCUTANEOUS at 12:23

## 2023-11-20 RX ADMIN — CEFAZOLIN SODIUM 2 G: 2 INJECTION, SOLUTION INTRAVENOUS at 10:43

## 2023-11-20 RX ADMIN — PROPOFOL 50 MG: 10 INJECTION, EMULSION INTRAVENOUS at 10:53

## 2023-11-20 RX ADMIN — SODIUM CHLORIDE, POTASSIUM CHLORIDE, SODIUM LACTATE AND CALCIUM CHLORIDE 100 ML/HR: 600; 310; 30; 20 INJECTION, SOLUTION INTRAVENOUS at 08:45

## 2023-11-20 RX ADMIN — PROPOFOL 140 MCG/KG/MIN: 10 INJECTION, EMULSION INTRAVENOUS at 10:46

## 2023-11-20 RX ADMIN — FENTANYL CITRATE 50 MCG: 50 INJECTION, SOLUTION INTRAMUSCULAR; INTRAVENOUS at 10:43

## 2023-11-20 RX ADMIN — ONDANSETRON 4 MG: 2 INJECTION INTRAMUSCULAR; INTRAVENOUS at 11:02

## 2023-11-20 RX ADMIN — HYDROMORPHONE HYDROCHLORIDE 0.5 MG: 0.5 INJECTION, SOLUTION INTRAMUSCULAR; INTRAVENOUS; SUBCUTANEOUS at 12:32

## 2023-11-20 RX ADMIN — FAMOTIDINE 20 MG: 10 INJECTION, SOLUTION INTRAVENOUS at 09:36

## 2023-11-20 RX ADMIN — FENTANYL CITRATE 25 MCG: 50 INJECTION, SOLUTION INTRAMUSCULAR; INTRAVENOUS at 10:49

## 2023-11-20 RX ADMIN — GLYCOPYRROLATE 0.2 MG: 0.2 INJECTION, SOLUTION INTRAMUSCULAR; INTRAVENOUS at 10:50

## 2023-11-20 RX ADMIN — FENTANYL CITRATE 25 MCG: 50 INJECTION, SOLUTION INTRAMUSCULAR; INTRAVENOUS at 10:54

## 2023-11-20 RX ADMIN — HEPARIN SODIUM 6000 UNITS: 5000 INJECTION INTRAVENOUS; SUBCUTANEOUS at 11:11

## 2023-11-20 SDOH — HEALTH STABILITY: MENTAL HEALTH: CURRENT SMOKER: 1

## 2023-11-20 ASSESSMENT — PAIN SCALES - GENERAL
PAINLEVEL_OUTOF10: 2
PAINLEVEL_OUTOF10: 7
PAINLEVEL_OUTOF10: 2
PAINLEVEL_OUTOF10: 7
PAINLEVEL_OUTOF10: 0 - NO PAIN
PAIN_LEVEL: 3
PAINLEVEL_OUTOF10: 4
PAINLEVEL_OUTOF10: 7
PAINLEVEL_OUTOF10: 0 - NO PAIN
PAINLEVEL_OUTOF10: 0 - NO PAIN
PAINLEVEL_OUTOF10: 2

## 2023-11-20 ASSESSMENT — PAIN DESCRIPTION - LOCATION: LOCATION: LEG

## 2023-11-20 ASSESSMENT — PAIN - FUNCTIONAL ASSESSMENT
PAIN_FUNCTIONAL_ASSESSMENT: 0-10
PAIN_FUNCTIONAL_ASSESSMENT: 0-10

## 2023-11-20 ASSESSMENT — COLUMBIA-SUICIDE SEVERITY RATING SCALE - C-SSRS
2. HAVE YOU ACTUALLY HAD ANY THOUGHTS OF KILLING YOURSELF?: NO
6. HAVE YOU EVER DONE ANYTHING, STARTED TO DO ANYTHING, OR PREPARED TO DO ANYTHING TO END YOUR LIFE?: NO
1. IN THE PAST MONTH, HAVE YOU WISHED YOU WERE DEAD OR WISHED YOU COULD GO TO SLEEP AND NOT WAKE UP?: NO

## 2023-11-20 ASSESSMENT — PAIN DESCRIPTION - ORIENTATION: ORIENTATION: RIGHT

## 2023-11-20 NOTE — TELEPHONE ENCOUNTER
Chief Complaint : Pubic lice    Symptoms: Lots of itching in  genital area.  Super small bugs pubic hair. ...  Crab eggs   Duration: 3 months     Treatments: jock itch, powders    Patient Requesting: pill that you take once and then again 10 days later    Did the Patient have the covid Vaccine:    Pharmacy: [facility]     Covid Tested:

## 2023-11-20 NOTE — ANESTHESIA PREPROCEDURE EVALUATION
Patient: Hugo Gauthier    Procedure Information       Date/Time: 11/20/23 1030    Procedure: BILATERAL LOWER VENOGRAM WITH INTRAVASCULAR ULTRASOUND *C-ARM* Niko with metronic Mason with idya (Bilateral)    Location: [facility] OR 08 / Virtual [facility] OR    Surgeons: Franky Camejo, DO            Relevant Problems   Anesthesia (within normal limits)      Cardiovascular   (+) (HFpEF) heart failure with preserved ejection fraction (CMS/HCC)   (+) Atrial fibrillation (CMS/HCC)   (+) Coronary artery disease involving native coronary artery of native heart   (+) HTN (hypertension)   (+) Other chest pain      Endocrine   (+) Class 3 severe obesity due to excess calories with serious comorbidity and body mass index (BMI) of 50.0 to 59.9 in adult (CMS/HCC)   (+) Morbid obesity (CMS/HCC)      Neuro/Psych   (+) Test anxiety      Pulmonary   (+) RADHA (obstructive sleep apnea)   (+) SOBOE (shortness of breath on exertion)      Hematology   (+) Anemia due to blood loss      Musculoskeletal   (+) Chronic lower back pain      Infectious Disease   (+) Onychomycosis of toenail   (+) Tinea cruris       Clinical information reviewed:   Tobacco  Allergies  Meds  Problems  Med Hx  Surg Hx   Fam Hx  Soc   Hx        NPO Detail:  No data recorded     Physical Exam    Airway  Mallampati: II  TM distance: >3 FB     Cardiovascular - normal exam     Dental   Comments: %30 missing teeth   Pulmonary - normal exam     Abdominal - normal exam             Anesthesia Plan    ASA 3     MAC   (Possible GA)  The patient is a current smoker.    intravenous induction   Anesthetic plan and risks discussed with patient.  Use of blood products discussed with patient who.    Plan discussed with CRNA.

## 2023-11-20 NOTE — DISCHARGE INSTRUCTIONS
Leave dressing in place 48 hours  Steristrips will fall off on their own  Light activity until seen in office  Diet as tolerated  Okay to shower in 72 hours (unless tunneled dialysis catheter in place)  Call with questions or concerns

## 2023-11-20 NOTE — ANESTHESIA POSTPROCEDURE EVALUATION
Patient: Hugo Gauthier    Procedure Summary       Date: 11/20/23 Room / Location: [facility] 08 / Virtual [facility] OR    Anesthesia Start: 1043 Anesthesia Stop: 1136    Procedure: BILATERAL LOWER VENOGRAM WITH INTRAVASCULAR ULTRASOUND *C-ARM* Niko with metronic Mason with diya (Bilateral) Diagnosis:       Venous insufficiency      (I87.2)    Surgeons: Franky Camejo DO Responsible Provider: GIANCARLO Stovall    Anesthesia Type: MAC ASA Status: 3            Anesthesia Type: MAC    Vitals Value Taken Time   /72 11/20/23 1242   Temp 36.4 °C (97.6 °F) 11/20/23 1135   Pulse 93 11/20/23 1248   Resp 12 11/20/23 1248   SpO2 97 % 11/20/23 1248   Vitals shown include unvalidated device data.    Anesthesia Post Evaluation    Patient location during evaluation: PACU  Patient participation: complete - patient participated  Level of consciousness: awake and alert  Pain score: 3  Pain management: adequate  Airway patency: patent  Cardiovascular status: acceptable  Respiratory status: acceptable  Hydration status: euvolemic  Postoperative Nausea and Vomiting: none        There were no known notable events for this encounter.

## 2023-11-20 NOTE — OP NOTE
BILATERAL LOWER VENOGRAM WITH INTRAVASCULAR ULTRASOUND *C-ARM* Niko with metronic Mason with diya (B) Operative Note     Date: 2023  OR Location: [facility]    Name: Hugo Gauthier, : 1960, Age: 63 y.o., MRN: [id number], Sex: male    Diagnosis  Pre-op Diagnosis     * Venous insufficiency [I87.2] Post-op Diagnosis     * Venous insufficiency [I87.2]     Procedures  BILATERAL LOWER VENOGRAM WITH INTRAVASCULAR ULTRASOUND *C-ARM* Niko with metronic Mason with diya  14543 - [place] SLCTV CATH PLMT BEBETO SYS 1ST ORDER BRANCH    [place] INTRAVASCULAR US NONCORONARY RS&I INTIAL VESSEL [31121]  CHG VENOGRAPHY EXTREMITY BILATERAL RS&I [28995]  [place] INTRAVASCULAR US NONCORONARY RS&I ADDL VESSEL [83153]  Surgeons      * Franky Camejo - Primary    Resident/Fellow/Other Assistant:  Surgeon(s) and Role:    Procedure Summary  Anesthesia: Monitor Anesthesia Care  ASA: III  Anesthesia Staff: CRNA: SAMIR Stovall-CRNA  Estimated Blood Loss: 10mL  Intra-op Medications:   Medication Name Total Dose   iodixanol (VISIPaque) 270 mg iodine/mL injection 50 mL   lactated Ringer's infusion Cannot be calculated   ceFAZolin in dextrose (iso-os) (Ancef) IVPB 2 g 2 g              Anesthesia Record               Intraprocedure I/O Totals          Intake    Propofol Drip 100.99 mL    The total shown is the total volume documented since Anesthesia Start was filed.    lactated Ringer's infusion 600.00 mL    ceFAZolin in dextrose (iso-os) (Ancef) IVPB 2 g 100.00 mL    Total Intake 800.99 mL          Specimen: No specimens collected     Staff:   Circulator: Ga Novak RN  Scrub Person: Angélica Gramajo         Drains and/or Catheters: * None in log *    Tourniquet Times:         Implants:  Implants       Type Name Action Serial No.      Stent STENT, ABRE VENOUS, [id number] - [id number] Implanted               Findings: right iliac vein stenosis    Indications: Hugo Guathier is an 63 y.o. male who is having surgery for I87.2. Bilateral lower extremity  edema    The patient was seen in the preoperative area. The risks, benefits, complications, treatment options, non-operative alternatives, expected recovery and outcomes were discussed with the patient. The possibilities of reaction to medication, pulmonary aspiration, injury to surrounding structures, bleeding, recurrent infection, the need for additional procedures, failure to diagnose a condition, and creating a complication requiring transfusion or operation were discussed with the patient. The patient concurred with the proposed plan, giving informed consent.  The site of surgery was properly noted/marked if necessary per policy. The patient has been actively warmed in preoperative area. Preoperative antibiotics have been ordered and given within 1 hours of incision. Venous thrombosis prophylaxis     Procedure Details: Patient was brought to the OR suite placed in the supine position administered monitored anesthesia care both groins are sterilely prepped and draped standard fashion.  Ultrasound guidance was utilized to access the left femoral vein advancing wire to the vena cava with no difficulty sheath was advanced contrast venography was performed demonstrating no evidence of intraluminal filling defect in the vena cava or iliac vein.  IVUS device was subsequently advanced and withdrawn inferior vena cava surface reference was 362 mm², left common iliac vein surface reference was 238 mm²  ,left external  iliac surface reference was  220 mm²,  left common femoral vein surface reference was 173 mm².  Ultrasound guidance was utilized to access the right femoral vein Bentson wire to the vena cava with no difficulty sheath was advanced contrast venography was performed demonstrated no evidence of intraluminal filling defect in the iliac circuit.  The right common iliac surface reference was 251 mm², right extrailiac vein surface reference was 167 mm², the right common femoral vein surface reference was 220 mm².    There was an area of compression of the right distal common iliac vein of 102 mm² 60% difference.  Patient is systemically heparinized I reintroduced the IVUS marking the area of compression.  A 18 x 120 David stent was deployed postdilated with a 16 x 40 Avilla balloon.  Post angiographic compression resolved to 263 mm².  At the termination procedure all catheter wires were removed manual compression was applied excellent hemostasis maintained patient will consent the PACU in stable condition.       Complications:  None; patient tolerated the procedure well.    Disposition: PACU - hemodynamically stable.  Condition: stable         Additional Details:      Attending Attestation: I was present and scrubbed for the entire procedure.    Franky Camejo  Phone Number: [phone]

## 2023-11-21 ENCOUNTER — TELEPHONE (OUTPATIENT)
Dept: PRIMARY CARE | Facility: CLINIC | Age: 63
End: 2023-11-21
Payer: COMMERCIAL

## 2023-11-21 NOTE — TELEPHONE ENCOUNTER
[facility] calling to see if Ivermectin needs to be a quantity of 8 based on instructions. Please advise. Thanks!

## 2023-11-22 ENCOUNTER — APPOINTMENT (OUTPATIENT)
Dept: PRIMARY CARE | Facility: CLINIC | Age: 63
End: 2023-11-22
Payer: COMMERCIAL

## 2023-11-22 ENCOUNTER — TELEPHONE (OUTPATIENT)
Dept: PRIMARY CARE | Facility: CLINIC | Age: 63
End: 2023-11-22

## 2023-11-22 NOTE — TELEPHONE ENCOUNTER
[facility] called they would like a call to clarifies the med iver mectin 3 mg at [phone]

## 2023-12-04 ENCOUNTER — OFFICE VISIT (OUTPATIENT)
Dept: VASCULAR SURGERY | Facility: CLINIC | Age: 63
End: 2023-12-04
Payer: COMMERCIAL

## 2023-12-04 VITALS
SYSTOLIC BLOOD PRESSURE: 120 MMHG | HEIGHT: 70 IN | WEIGHT: 315 LBS | BODY MASS INDEX: 45.1 KG/M2 | HEART RATE: 78 BPM | DIASTOLIC BLOOD PRESSURE: 76 MMHG

## 2023-12-04 DIAGNOSIS — I87.1 ILIAC VEIN STENOSIS, LEFT: Primary | ICD-10-CM

## 2023-12-04 PROCEDURE — 3044F HG A1C LEVEL LT 7.0%: CPT | Performed by: INTERNAL MEDICINE

## 2023-12-04 PROCEDURE — 3074F SYST BP LT 130 MM HG: CPT | Performed by: INTERNAL MEDICINE

## 2023-12-04 PROCEDURE — 3008F BODY MASS INDEX DOCD: CPT | Performed by: INTERNAL MEDICINE

## 2023-12-04 PROCEDURE — 3078F DIAST BP <80 MM HG: CPT | Performed by: INTERNAL MEDICINE

## 2023-12-04 PROCEDURE — 99213 OFFICE O/P EST LOW 20 MIN: CPT | Performed by: INTERNAL MEDICINE

## 2023-12-04 PROCEDURE — 1036F TOBACCO NON-USER: CPT | Performed by: INTERNAL MEDICINE

## 2023-12-04 PROCEDURE — 4010F ACE/ARB THERAPY RXD/TAKEN: CPT | Performed by: INTERNAL MEDICINE

## 2023-12-04 ASSESSMENT — ENCOUNTER SYMPTOMS
PSYCHIATRIC NEGATIVE: 1
WOUND: 1
RESPIRATORY NEGATIVE: 1
EYES NEGATIVE: 1
ALLERGIC/IMMUNOLOGIC NEGATIVE: 1
MUSCULOSKELETAL NEGATIVE: 1
CONSTITUTIONAL NEGATIVE: 1
GASTROINTESTINAL NEGATIVE: 1
NEUROLOGICAL NEGATIVE: 1
DEPRESSION: 0
LOSS OF SENSATION IN FEET: 0
ENDOCRINE NEGATIVE: 1
OCCASIONAL FEELINGS OF UNSTEADINESS: 1
HEMATOLOGIC/LYMPHATIC NEGATIVE: 1

## 2023-12-04 NOTE — PROGRESS NOTES
Subjective   Patient ID: Hugo Gauthier is a 63 y.o. male who presents for No chief complaint on file..  HPI  63 year old male with a past medical history of obesity, afib, cellulitis, back pain, chronic venous stasis dermatitis and HTN that presents to the office for a follow up s/p a venogram with left iliac vein stent per Dr. Camejo on 11/20/23. Overall he is doing well. He states that his swelling in his left leg has improved. He is currently going to the wound center, on and off the past 2 years for chronic venous stasis. He has an open wound on his left lower leg- is going to the wound center every month. He is tolerating the Plavix well. He denies any signs of bleeding in his stool or urine.      Vascular testing:  CT abd/pelvis 8/23: No stigmata of May-Thurner syndrome   Venous insufficiency reflux ultrasound 5/15/23: deep venous reflux and Pulsatile bilaterally.   PVR 5/15/23: No evidence of arterial occlusive disease in the right lower extremity at rest. Left pressures of >220 mmHg suggest no compressibility of vessels and may make absolute Segmental Limb Pressures (SLP) unreliable    Vascular procedures:  11/20/23: Venogram s/p left iliac vein stent per Dr. Camejo    Review of Systems   Constitutional: Negative.    HENT: Negative.     Eyes: Negative.    Respiratory: Negative.     Cardiovascular:  Positive for leg swelling.   Gastrointestinal: Negative.    Endocrine: Negative.    Genitourinary: Negative.    Musculoskeletal: Negative.    Skin:  Positive for wound.   Allergic/Immunologic: Negative.    Neurological: Negative.    Hematological: Negative.    Psychiatric/Behavioral: Negative.         Objective   Physical Exam  Constitutional:       Appearance: He is obese.   Eyes:      Pupils: Pupils are equal, round, and reactive to light.   Cardiovascular:      Rate and Rhythm: Normal rate.   Pulmonary:      Effort: Pulmonary effort is normal.   Abdominal:      General: Bowel sounds are normal.   Musculoskeletal:          General: Swelling present.      Cervical back: Normal range of motion.   Skin:     General: Skin is warm and dry.      Capillary Refill: Capillary refill takes less than 2 seconds.   Neurological:      General: No focal deficit present.      Mental Status: He is alert.         Assessment/Plan   63 year old male with a past medical history of obesity, afib, cellulitis, back pain, chronic venous stasis dermatitis and HTN that presents to the office for a follow up s/p a venogram with left iliac vein stent per Dr. Camejo on 11/20/23.     Plan:  Continue going to the wound clinic   Recommend an IVC ultrasound in 3 mths to check stent patency. Follow up after testing  Call office if you develop worsening leg pain, swelling or develop new or worsening leg wounds  Continue Plavix for the full 30 days  Dicussed medication and SE.  Call office if you develop any signs of bleeding

## 2023-12-10 ENCOUNTER — TELEPHONE (OUTPATIENT)
Dept: PRIMARY CARE | Facility: CLINIC | Age: 63
End: 2023-12-10

## 2023-12-10 DIAGNOSIS — I48.11 LONGSTANDING PERSISTENT ATRIAL FIBRILLATION (MULTI): ICD-10-CM

## 2023-12-11 ENCOUNTER — OFFICE VISIT (OUTPATIENT)
Dept: WOUND CARE | Facility: CLINIC | Age: 63
End: 2023-12-11
Payer: COMMERCIAL

## 2023-12-11 ENCOUNTER — EVALUATION (OUTPATIENT)
Dept: PHYSICAL THERAPY | Facility: CLINIC | Age: 63
End: 2023-12-11
Payer: COMMERCIAL

## 2023-12-11 ENCOUNTER — LAB (OUTPATIENT)
Dept: LAB | Facility: LAB | Age: 63
End: 2023-12-11
Payer: COMMERCIAL

## 2023-12-11 DIAGNOSIS — I48.11 LONGSTANDING PERSISTENT ATRIAL FIBRILLATION (MULTI): ICD-10-CM

## 2023-12-11 DIAGNOSIS — M54.50 LOW BACK PAIN: Primary | ICD-10-CM

## 2023-12-11 DIAGNOSIS — M48.062 SPINAL STENOSIS, LUMBAR REGION WITH NEUROGENIC CLAUDICATION: ICD-10-CM

## 2023-12-11 LAB
INR PPP: 3.7 (ref 0.9–1.1)
PROTHROMBIN TIME: 42.7 SECONDS (ref 9.8–12.8)

## 2023-12-11 PROCEDURE — 97161 PT EVAL LOW COMPLEX 20 MIN: CPT | Mod: GP

## 2023-12-11 PROCEDURE — 11042 DBRDMT SUBQ TIS 1ST 20SQCM/<: CPT | Performed by: CLINICAL NURSE SPECIALIST

## 2023-12-11 PROCEDURE — 85610 PROTHROMBIN TIME: CPT

## 2023-12-11 PROCEDURE — 97110 THERAPEUTIC EXERCISES: CPT | Mod: GP

## 2023-12-11 PROCEDURE — 36415 COLL VENOUS BLD VENIPUNCTURE: CPT

## 2023-12-11 PROCEDURE — 11045 DBRDMT SUBQ TISS EACH ADDL: CPT | Mod: ZK

## 2023-12-11 PROCEDURE — 11042 DBRDMT SUBQ TIS 1ST 20SQCM/<: CPT | Mod: ZK

## 2023-12-11 PROCEDURE — 11045 DBRDMT SUBQ TISS EACH ADDL: CPT | Performed by: CLINICAL NURSE SPECIALIST

## 2023-12-11 ASSESSMENT — ENCOUNTER SYMPTOMS
LOSS OF SENSATION IN FEET: 1
OCCASIONAL FEELINGS OF UNSTEADINESS: 0
DEPRESSION: 0

## 2023-12-11 ASSESSMENT — PAIN SCALES - GENERAL: PAINLEVEL_OUTOF10: 0 - NO PAIN

## 2023-12-11 ASSESSMENT — PAIN - FUNCTIONAL ASSESSMENT: PAIN_FUNCTIONAL_ASSESSMENT: 0-10

## 2023-12-11 NOTE — PROGRESS NOTES
"Physical Therapy    Physical Therapy Evaluation and Treatment      Patient Name: Hugo Gauthier  MRN: [id number]  Today's Date: 12/11/2023  Time Calculation  Start Time: 0845  Stop Time: 0930  Time Calculation (min): 45 min    Assessment:    The patient is a 63 year old male presenting with LBP, trunk weakness, decreased trunk ROM, & CALLIE HS muscle tightness.  The patient will benefit from skilled intervention to address above deficits and return to PLOF.      Plan:  OP PT Plan  Treatment/Interventions: Cryotherapy, Education/ Instruction, Hot pack, Manual therapy, Therapeutic exercises  PT Plan: Skilled PT  PT Frequency: 2 times per week  Plan of Care Agreement: Patient    Current Problem:   1. Low back pain  Follow Up In Physical Therapy      2. Spinal stenosis, lumbar region with neurogenic claudication  Referral to Physical Therapy          Subjective    General:  General  Reason for Referral: lumbar stenosis with neurogenic claudication  Referred By: Araceli Delaney PA-C  Past Medical History Relevant to Rehab: HTN  The patient reports he was lifting a heavy object a year ago and heard a \"pop\" in his low back.  Walking exacerbates pain.  Pain ranges from 0-8/10.  The patient denies experiencing LE radicular symptoms.  MRI revealed degenerative changes with narrowing/encroachment on the lateral recesses at L3/4 L4/L5.  No follow up scheduled with Doctor at this time.  The patient's goal is to decrease pain with daily activities.      Precautions:  Precautions  STEADI Fall Risk Score (The score of 4 or more indicates an increased risk of falling): 5     Pain:  Pain Assessment  Pain Assessment: 0-10  Pain Score: 0 - No pain     Prior Level of Function:  Prior Function Per Pt/Caregiver Report  Level of [place]: Independent with ADLs and functional transfers    Objective   Trunk AROM (degrees)  Flexion = 53  Extension = neutral    Trunk strength = Poor    HS flexibility = patient unable to tolerate supine positioning " "    Outcome Measures:  Other Measures  Oswestry Disablity Index (DOMINIQUE): 28     Treatments:  Seated HS stretch on stool x3 ea 30\"H  Pulldowns Blue TB x10  Rows Blue TB x10   Skis Blue TB x10    EDUCATION:   HEP    Goals:  1) Decrease LBP with daily activities <2/10    2) Increase trunk strength to >Fair for improving posture with daily activities           "

## 2023-12-13 RX ORDER — WARFARIN 4 MG/1
TABLET ORAL
Qty: 30 TABLET | Refills: 0 | Status: SHIPPED | OUTPATIENT
Start: 2023-12-13 | End: 2024-01-18

## 2023-12-13 NOTE — RESULT ENCOUNTER NOTE
INR 3.7- high end of where we ant you is 3.5- would hold for 1-2 days and resume. Recheck in a month.

## 2023-12-18 ENCOUNTER — CLINICAL SUPPORT (OUTPATIENT)
Dept: SLEEP MEDICINE | Facility: CLINIC | Age: 63
End: 2023-12-18
Payer: COMMERCIAL

## 2023-12-18 VITALS
WEIGHT: 315 LBS | HEIGHT: 70 IN | BODY MASS INDEX: 45.1 KG/M2 | DIASTOLIC BLOOD PRESSURE: 76 MMHG | SYSTOLIC BLOOD PRESSURE: 120 MMHG

## 2023-12-18 DIAGNOSIS — G47.33 OSA (OBSTRUCTIVE SLEEP APNEA): ICD-10-CM

## 2023-12-18 DIAGNOSIS — G47.37 CENTRAL SLEEP APNEA IN CONDITIONS CLASSIFIED ELSEWHERE: ICD-10-CM

## 2023-12-18 PROCEDURE — 95811 POLYSOM 6/>YRS CPAP 4/> PARM: CPT | Performed by: PSYCHIATRY & NEUROLOGY

## 2023-12-18 ASSESSMENT — SLEEP AND FATIGUE QUESTIONNAIRES
HOW LIKELY ARE YOU TO NOD OFF OR FALL ASLEEP WHILE SITTING AND READING: HIGH CHANCE OF DOZING
SITING INACTIVE IN A PUBLIC PLACE LIKE A CLASS ROOM OR A MOVIE THEATER: HIGH CHANCE OF DOZING
HOW LIKELY ARE YOU TO NOD OFF OR FALL ASLEEP WHILE SITTING QUIETLY AFTER LUNCH WITHOUT ALCOHOL: HIGH CHANCE OF DOZING
HOW LIKELY ARE YOU TO NOD OFF OR FALL ASLEEP WHILE LYING DOWN TO REST IN THE AFTERNOON WHEN CIRCUMSTANCES PERMIT: HIGH CHANCE OF DOZING
HOW LIKELY ARE YOU TO NOD OFF OR FALL ASLEEP IN A CAR, WHILE STOPPED FOR A FEW MINUTES IN TRAFFIC: MODERATE CHANCE OF DOZING
HOW LIKELY ARE YOU TO NOD OFF OR FALL ASLEEP WHEN YOU ARE A PASSENGER IN A CAR FOR AN HOUR WITHOUT A BREAK: HIGH CHANCE OF DOZING
HOW LIKELY ARE YOU TO NOD OFF OR FALL ASLEEP WHILE WATCHING TV: HIGH CHANCE OF DOZING
HOW LIKELY ARE YOU TO NOD OFF OR FALL ASLEEP WHILE SITTING AND TALKING TO SOMEONE: HIGH CHANCE OF DOZING
ESS-CHAD TOTAL SCORE: 23

## 2023-12-19 NOTE — TELEPHONE ENCOUNTER
INR 3.7- high end of where we ant you is 3.5- would hold for 1-2 days and resume. Recheck in a month      Patient was notified.    Patient asking to have a standing order in for his INR for the out patient lab. Says he went down and they told him it was for 1 time only.

## 2023-12-19 NOTE — PROGRESS NOTES
[facility] TECH NOTE:     Patient: Hugo Gauthier   MRN//AGE: [id number]  1960  63 y.o.   Technologist: PAMELA Stearns   Room: 3   Service Date: 2023        [facility]    TECHNOLOGIST SLEEP STUDY PROCEDURE NOTE:   This sleep study is being conducted according to the policies and procedures outlined by the AAS accreditation standards.  The sleep study procedure and processes involved during this appointment was explained to the patient/patient’s family, questions were answered. The patient/family verbalized understanding.      The patient is a 63 y.o. year old male scheduled for aCPAP titration with montage of:  standard . he arrived for his appointment.      The study that was ultimately completed was aCPAP titration with montage of:  Standard .    The full study Was completed.  Patient questionnaires completed?: yes     Consents signed? no    Initial Fall Risk Screening:     Hugo has fallen in the last 6 months. his did result in injury. Hugo does not have a fear of falling. He does not need assistance with sitting, standing, or walking. he does not need assistance walking in his home. he does not need assistance in an unfamiliar setting. The patient is notusing an assistive device.     Brief Study observations:   CPAP from 6 to 20 cm. BiPAP I24-28/E20-24.  Back up rate of 10  was added for centrals events.  Respiratory events were still occurring.  A [employer] Nuance nasal gel pillow mask was preferred.  The pt. slept in the supine postion only.  PLM's were present.  A Fib noted in the ECG.    Deviation to order/protocol and reason: The study was scripted to start with CPAP at 14 cm.  The patient could not tolerate 14 cm.  Hewas comfortable starting at 6 cm.     If PAP, which was preferred mask/pressure/mode: [employer] Nuance Gel Pillows. Large.      Other: [employer] stopped responding.  Tech support called.  After the procedure, the  patient/family was informed to ensure followup with ordering clinician for testing results.      Technologist: PAMELA Stearns

## 2023-12-22 ENCOUNTER — TELEPHONE (OUTPATIENT)
Dept: NEUROLOGY | Facility: HOSPITAL | Age: 63
End: 2023-12-22
Payer: COMMERCIAL

## 2023-12-22 NOTE — TELEPHONE ENCOUNTER
Pt had sleep study done 12/18/23 and is asking for his CPAP orders to be placed before the end of the year so his deductible will pay for it.  Has $20,000 in medical debt and is trying to be proactive with his financial situation.    Thank you.

## 2023-12-23 ENCOUNTER — DOCUMENTATION (OUTPATIENT)
Dept: NEUROLOGY | Facility: HOSPITAL | Age: 63
End: 2023-12-23
Payer: COMMERCIAL

## 2023-12-23 DIAGNOSIS — G47.31 CENTRAL SLEEP APNEA: ICD-10-CM

## 2023-12-23 DIAGNOSIS — G47.33 OSA (OBSTRUCTIVE SLEEP APNEA): Primary | ICD-10-CM

## 2023-12-23 NOTE — PROGRESS NOTES
12/23/23. PAP titration noted. Pt couldn't tolerate higher starting pressure. Treatment emergent central apneas again with biPAP, unresolved up to biPAP S/T. This is all-supine. Pt unable to sleep non-supine.    I called pt let him know. Option would be ASV titration, and perhaps raise of head of bed up during titration, and hope that an optimal pressure can be seen. Pt understands there may not be optimal pressure seen still with this. Also understands that IF optimal pressure is seen and HOB is up, he will need to replicate this HOB elevation with home situation.  I have ordered repeat ASV titration (START EPAP 8-20, PS 6-20). RAISE HEAD OF BED UP AS TOLERATED TO HELP WITH RADHA/INCREASE CHANCE OF GETTING OPTIMAL PRESSURE. PT CAN ONLY SLEEP SUPINE.    Dr aaron

## 2024-01-08 ENCOUNTER — OFFICE VISIT (OUTPATIENT)
Dept: WOUND CARE | Facility: CLINIC | Age: 64
End: 2024-01-08
Payer: COMMERCIAL

## 2024-01-08 ENCOUNTER — CLINICAL SUPPORT (OUTPATIENT)
Dept: PHYSICAL THERAPY | Facility: CLINIC | Age: 64
End: 2024-01-08
Payer: COMMERCIAL

## 2024-01-08 DIAGNOSIS — G89.29 CHRONIC RIGHT-SIDED LOW BACK PAIN WITHOUT SCIATICA: ICD-10-CM

## 2024-01-08 DIAGNOSIS — M54.50 LOW BACK PAIN: Primary | ICD-10-CM

## 2024-01-08 DIAGNOSIS — M54.50 CHRONIC RIGHT-SIDED LOW BACK PAIN WITHOUT SCIATICA: ICD-10-CM

## 2024-01-08 PROCEDURE — 11042 DBRDMT SUBQ TIS 1ST 20SQCM/<: CPT

## 2024-01-08 PROCEDURE — 11042 DBRDMT SUBQ TIS 1ST 20SQCM/<: CPT | Performed by: CLINICAL NURSE SPECIALIST

## 2024-01-08 PROCEDURE — 11045 DBRDMT SUBQ TISS EACH ADDL: CPT | Performed by: CLINICAL NURSE SPECIALIST

## 2024-01-08 PROCEDURE — 11045 DBRDMT SUBQ TISS EACH ADDL: CPT

## 2024-01-08 PROCEDURE — 99213 OFFICE O/P EST LOW 20 MIN: CPT | Performed by: CLINICAL NURSE SPECIALIST

## 2024-01-08 PROCEDURE — 97110 THERAPEUTIC EXERCISES: CPT | Mod: GP

## 2024-01-08 ASSESSMENT — PAIN - FUNCTIONAL ASSESSMENT: PAIN_FUNCTIONAL_ASSESSMENT: 0-10

## 2024-01-08 ASSESSMENT — PAIN SCALES - GENERAL: PAINLEVEL_OUTOF10: 4

## 2024-01-08 NOTE — PROGRESS NOTES
"Physical Therapy    Physical Therapy Treatment      Patient Name: Hugo Gauthier  MRN: [id number]  Today's Date: 1/8/2024  Time Calculation  Start Time: 0840  Stop Time: 0915  Time Calculation (min): 35 min    Assessment:    The patient demonstrates increased trunk extension AROM since IE.  The patient will follow up with us in 1 month for reassessment.      Plan:   1x/month for reassessment    Current Problem:   1. Low back pain  Follow Up In Physical Therapy    Follow Up In Physical Therapy          Subjective    General:   The patient reports his back feels stronger since beginning HEP.    Precautions:  Precautions  Precautions Comment: none     Pain:  Pain Assessment  Pain Assessment: 0-10  Pain Score: 4        Objective   Trunk AROM (degrees)  Flexion = 49  Extension = 9  R SB = 10  L SB = 14    Trunk strength = Poor    Other Measures  Oswestry Disablity Index (DOMINIQUE): 24  Treatments:  EXERCISES Date 1/8/24 Date Date Date    REPS REPS REPS REPS          Nustep              Shuttle   DLP       Shuttle   SLP              Shuttle   TR/HR        Reassessment 10'             Tband   Lat Pulls Blue x 20      Tband   Skis Blue x 20      Tband   Mid Row Blue x 20      Tband   Mower starts              4 Way Hip Flex 30# 2x10      4 Way Hip Abduction 30# 2x10             Prone       Prone Prop       Prone Press up              PPT       PPT with hip add/abd       PPT with marches              Seated Physioball 3 way stretch X5 ea 5\"H      Stretches              Standing calf stretch at steps X3 ea 30\"H                  EDUCATION:   HEP    Goals:  1) Decrease LBP with daily activities <2/10 -partially met     2) Increase trunk strength to >Fair for improving posture with daily activities -partially met         "

## 2024-01-16 NOTE — PROGRESS NOTES
[facility]    Patient Name: Hugo Gauthier  Patient : 1960      Scribe Attestation  By signing my name below, I, Dawit Doss   attest that this documentation has been prepared under the direction and in the presence of Gorge Medeiros DO.      Reason for visit:  This is a 63-year-old male here for follow-up regarding HFpEF, minimal nonobstructive coronary artery disease as seen on cardiac catheterization done in 2023, sleep apnea/chronic fatigue, hypertension, chronic venous stasis dermatitis followed by Vascular Surgery, and morbid obesity.     HPI:  This is a 63-year-old male here for follow-up regarding HFpEF, minimal nonobstructive coronary artery disease as seen on cardiac catheterization done in 2023, sleep apnea/chronic fatigue, hypertension, chronic venous stasis dermatitis followed by Vascular Surgery, and morbid obesity.  The patient was last evaluated by me in 2023.  At that visit I referred him to vascular surgery and otherwise asked that he follow-up as previously recommended.  I had seen him 10 days prior where I had started him on spironolactone 25 mg twice daily, furosemide 40 mg daily, ordered blood work for 1 week as well as a CMP/lipid/magnesium/CBC/BNP to be drawn in 3 months, and asked patient to follow-up in 3 months.  Patient was subsequently seen by vascular surgery and underwent a bilateral lower venogram with left iliac vein stent on 2023.  Patient has been following with Neurology for sleep apnea.  BMP done 2023 showed a serum sodium 135, serum potassium of 4.6, serum creatinine 0.80, CBC showed a hemoglobin of 12.6.  INR done 2023 was 3.7 with warfarin managed by his PCP. ECG done today showed sinus rhythm with a heart rate of 75 bpm.  The patient reports that he has been feeling generally well from the cardiac standpoint. He denies any new chest pain, shortness of breath, palpitations  and lightheadedness. He states that lower extremity edema had been improving since. He states that he had lost 20 pounds through regular exercise eating healthier food choices since the last visit. He states that he had only been taking spironolactone 25 mg daily. He states that he takes his other medications as prescribed. During my exam, he was resting comfortably on the exam table.            Assessment/Plan:   1. HFpEF  The patient has a history of HFpEF.  Echocardiogram done in September 2023 showed normal left ventricular systolic function with an ejection fraction of 60%, grade 1 diastolic dysfunction, and difficult imaging with poorly visualized anatomic structures.    He does not appear significantly volume overloaded on exam today except for trace to 1+ pitting bilateral lower extremity edema.  Start diltiazem  mg daily.  Continue spironolactone 25 mg daily.  He should continue his current cardiac medications.  Recent lab works as noted in the HPI.   Lab works as noted below will be done in 6 months prior to his next visit.  I discussed with him the importance of following a low-sodium heart healthy diet as well as weight loss.   Follow up in 6 months and sooner if necessary.     2. Coronary artery disease  The patient had a history of minimal nonobstructive coronary artery disease as seen on cardiac catheterization done in October 2023.  ECG done today showed sinus rhythm with a heart rate of 75 bpm.   He denies anginal chest discomfort or shortness of breath on exertion.  Blood pressure appears controlled on exam today.  Start diltiazem  mg daily.  Continue spironolactone 25 mg daily.  He should continue his other antihypertensive medications and antiplatelet therapy.  Echocardiogram done in September 2023 showed normal left ventricular systolic function with an ejection fraction of 60%, grade 1 diastolic dysfunction, and difficult imaging with poorly visualized anatomic structures.    Recent  lab works as noted in the HPI.   Lab works as noted below will be done in 6 months prior to his next visit.   Please see lifestyle recommendations below.  Follow up in 6 months and sooner if necessary.      3. Sleep apnea/Chronic fatigue  The previously reported chronic fatigue had remained unchanged since the last visit.   Sleep study results obtained showing obstructive sleep apnea.  He should continue to follow with Neurology.     4. Hypertension  The patient has a history of hypertension which appears controlled on exam today.  Start diltiazem  mg daily.  Continue spironolactone 25 mg daily.  He should continue his current antihypertensive medications.    5. Chronic venous stasis dermatitis  The patient has a history of chronic venous stasis dermatitis followed by Vascular Surgery.   He underwent a bilateral lower venogram with left iliac vein stent on 11/20/2023.   INR done 12/11/2023 was 3.7 with warfarin managed by his PCP.     6. Morbid obesity  The patient reports that he had lost 20 pounds through regular exercise and eating healthier food choices since his last visit.  Please see lifestyle recommendations below.       Orders:   Start diltiazem  mg daily.  Continue spironolactone 25 mg daily.  CMP/lipid/Mg/BNP in 6 months,   Follow-up in 6 months.    Lifestyle Recommendations  I recommend a whole-food plant-based diet, an eating pattern that encourages the consumption of unrefined plant foods (such as fruits, vegetables, tubers, whole grains, legumes, nuts and seeds) and discourages meats, dairy products, eggs and processed foods.     The AHA/ACC recommends that the patient consume a dietary pattern that emphasizes intake of vegetables, fruits, and whole grains; includes low-fat dairy products, poultry, fish, legumes, non-tropical vegetable oils, and nuts; and limits intake of sodium, sweets, sugar-sweetened beverages, and red meats.  Adapt this dietary pattern to appropriate calorie  requirements (a 500-750 kcal/day deficit to loose weight), personal and cultural food preferences, and nutrition therapy for other medical conditions (including diabetes).  Achieve this pattern by following plans such as the [place] Mediterranean, DASH dietary pattern, or AHA diet.     Engage in 2 hours and 30 minutes per week of moderate-intensity physical activity, or 1 hour and 15 minutes (75 minutes) per week of vigorous-intensity aerobic physical activity, or an equivalent combination of moderate and vigorous-intensity aerobic physical activity. Aerobic activity should be performed in episodes of at least 10 minutes preferably spread throughout the week.     Adhering to a heart healthy diet, regular exercise habits, avoidance of tobacco products, and maintenance of a healthy weight are crucial components of their heart disease risk reduction.     Any positive review of systems not specifically addressed in the office visit today should be evaluated and treated by the patients primary care physician or in an emergency department if necessary     Patient was notified that results from ordered tests will be called to the patient if it changes current management; it will otherwise be discussed at a future appointment and available on  [facility].     Thank you for allowing me to participate in the care of this patient.        This document was generated using the assistance of voice recognition software. If there are any errors of spelling, grammar, syntax, or meaning; please feel free to contact me directly for clarification.    Past Medical History:  He has a past medical history of Arrhythmia, Clotting disorder (CMS/HCC), Coronary artery disease involving native coronary artery of native heart (10/20/2023), Disease of thyroid gland, Hypertension, Personal history of diseases of the blood and blood-forming organs and certain disorders involving the immune mechanism (10/19/2021), and Personal history of diseases of  the blood and blood-forming organs and certain disorders involving the immune mechanism (10/19/2021).    He has no past medical history of AAA (abdominal aortic aneurysm) (CMS/formerly [facility]), Asthma, Cancer (CMS/HCC), Carotid artery occlusion, CHF (congestive heart failure) (CMS/formerly [facility]), Chronic kidney disease, Heart murmur, Hyperlipidemia, Stroke (CMS/formerly [facility]), or Syncope.    Past Surgical History:  He has a past surgical history that includes Cardiac catheterization (N/A, 10/13/2023).      Social History:  He reports that he has quit smoking. His smoking use included cigarettes. He has a 20.00 pack-year smoking history. He has never been exposed to tobacco smoke. He has never used smokeless tobacco. He reports that he does not currently use alcohol. He reports that he does not currently use drugs.    Family History:  No family history on file.     Allergies:  Patient has no known allergies.    Outpatient Medications:  Current Outpatient Medications   Medication Instructions    alpha tocopherol (Vitamin E) 268 mg (400 unit) capsule oral    ascorbic acid (Vitamin C) 1,000 mg tablet 1 tablet, oral, Daily    cholecalciferol (Vitamin D-3) 50 MCG (2000 UT) tablet 1 tablet, oral, Daily    clopidogrel (PLAVIX) 75 mg, oral, Daily    dilTIAZem (CARDIZEM) 60 mg, oral, Daily    folic acid (Folvite) 800 mcg tablet 1 tablet, oral, Daily    furosemide (LASIX) 40 mg, oral, Daily    iodine, bulk, crystals USE AS DIRECTED.    ivermectin (Stromectol) 3 mg tablet TAKE FOUR TABLETS BY MOUTH ONCE, AND REPEAT IN TWO WEEKS]    ketoconazole (NIZOral) 2 % cream Topical, 2 times daily    lisinopril 40 mg, oral, Daily    magnesium oxide (Mag-Ox) 400 mg tablet 1 tablet, oral, Daily    nystatin (Mycostatin) 100,000 unit/gram powder Topical, 2 times daily    selenium 200 mcg tablet 1 tablet, oral, Daily    silver sulfADIAZINE (Silvadene) 1 % cream APPLY AND RUB IN A THIN FILM TO AFFECTED AREAS TWICE DAILY.(AM AND PM).    spironolactone (ALDACTONE) 25 mg, oral,  "2 times daily    syringe with needle (BD Luer-Juliette Syringe) 3 mL 25 gauge x 1\" syringe 1 mL, miscellaneous, Every 14 days, Using syringe. 2 per month.    testosterone cypionate (DEPO-TESTOSTERONE) 200 mg, intramuscular, Every 14 days    warfarin (Coumadin) 4 mg tablet TAKE 1 TABLET EVERY DAY    warfarin (COUMADIN) 1 mg, oral    zinc gluconate 100 mg, oral, Daily        ROS:  A 14 point review of systems was done and is negative other than as stated in HPI    Vitals:      11/20/2023    12:50 PM 11/20/2023     1:01 PM 11/20/2023     1:10 PM 11/20/2023     1:20 PM 11/20/2023     1:30 PM 12/4/2023    10:09 AM 12/18/2023    11:25 PM   Vitals   Systolic 156 157 160 141 152 120 120   Diastolic 84 81 93 87 89 76 76   Heart Rate 93 93 93 96 95 78    Temp  36.4 °C (97.6 °F)        Resp 15 14 14 16 18     Height (in)      1.778 m (5' 10\") 1.78 m (5' 10.08\")   Weight (lb)      370 370.37   BMI      53.09 kg/m2 53.02 kg/m2   BSA (m2)      2.88 m2 2.88 m2        Physical Exam:   Constitutional: Cooperative, in no acute distress, alert, appears stated age.   Skin: Skin color, texture, turgor normal. No rashes or lesions.   Head: Normocephalic. No masses, lesions, tenderness or abnormalities   Eyes: Extraocular movements are grossly intact.   Mouth and throat: Mucous membranes moist   Neck: Neck supple, no carotid bruits, no JVD   Respiratory: Lungs clear to auscultation, no wheezing or rhonchi, no use of accessory muscles   Chest wall: No scars, normal excursion with respiration   Cardiovascular: Regular rhythm without murmur  Gastrointestinal: Abdomen soft, nontender. Bowel sounds normal. Morbidly obese.  Musculoskeletal: Strength equal in upper extremities   Extremities: Trace to 1+ pitting edema  Neurologic: Sensation grossly intact, alert and oriented ×3     Intake/Output:   No intake/output data recorded.    Outpatient Medications  Current Outpatient Medications on File Prior to Visit   Medication Sig Dispense Refill    alpha " "tocopherol (Vitamin E) 268 mg (400 unit) capsule Take by mouth.      ascorbic acid (Vitamin C) 1,000 mg tablet Take 1 tablet (1,000 mg) by mouth once daily.      cholecalciferol (Vitamin D-3) 50 MCG (2000 UT) tablet Take 1 tablet (2,000 Units) by mouth once daily.      dilTIAZem (Cardizem) 60 mg immediate release tablet Take 1 tablet (60 mg) by mouth once daily. 90 tablet 3    folic acid (Folvite) 800 mcg tablet Take 1 tablet (800 mcg) by mouth once daily.      furosemide (Lasix) 40 mg tablet Take 1 tablet (40 mg) by mouth once daily. 90 tablet 1    iodine, bulk, crystals USE AS DIRECTED.      ketoconazole (NIZOral) 2 % cream APPLY SPARINGLY TO AFFECTED AREA(S) TWICE DAILY 60 g 0    lisinopril 40 mg tablet Take 1 tablet (40 mg) by mouth once daily.      magnesium oxide (Mag-Ox) 400 mg tablet Take 1 tablet (400 mg) by mouth once daily.      nystatin (Mycostatin) 100,000 unit/gram powder Apply topically 2 times a day. 60 g 2    selenium 200 mcg tablet Take 1 tablet (200,000 mcg) by mouth once daily.      silver sulfADIAZINE (Silvadene) 1 % cream APPLY AND RUB IN A THIN FILM TO AFFECTED AREAS TWICE DAILY.(AM AND PM). 85 g 0    spironolactone (Aldactone) 25 mg tablet Take 1 tablet (25 mg) by mouth 2 times a day. 180 tablet 1    syringe with needle (BD Luer-Juliette Syringe) 3 mL 25 gauge x 1\" syringe 1 mL every 14 (fourteen) days. Using syringe. 2 per month. 2 each 5    testosterone cypionate (Depo-Testosterone) 200 mg/mL injection Inject 1 mL (200 mg) into the shoulder, thigh, or buttocks every 14 (fourteen) days. 2 mL 5    warfarin (Coumadin) 1 mg tablet TAKE 1 TABLET BY MOUTH EVERY DAY 30 tablet 0    warfarin (Coumadin) 4 mg tablet TAKE 1 TABLET EVERY DAY 30 tablet 0    zinc gluconate 100 mg tablet Take 1 tablet (100 mg) by mouth once daily.  0    clopidogrel (Plavix) 75 mg tablet Take 1 tablet (75 mg) by mouth once daily. 30 tablet 0    ivermectin (Stromectol) 3 mg tablet TAKE FOUR TABLETS BY MOUTH ONCE, AND REPEAT IN " TWO WEEKS] 8 tablet 0     No current facility-administered medications on file prior to visit.       Labs: (past 26 weeks)  Recent Results (from the past 4368 hour(s))   Renal Function Panel    Collection Time: 08/01/23  3:13 PM   Result Value Ref Range    Glucose 99 74 - 99 mg/dL    Sodium 140 136 - 145 mmol/L    Potassium 4.3 3.5 - 5.3 mmol/L    Chloride 104 98 - 107 mmol/L    Bicarbonate 29 21 - 32 mmol/L    Anion Gap 11 10 - 20 mmol/L    Urea Nitrogen 14 6 - 23 mg/dL    Creatinine 0.86 0.50 - 1.30 mg/dL    GFR MALE >90 >90 mL/min/1.73m2    Calcium 9.3 8.6 - 10.3 mg/dL    Phosphorus 3.5 2.5 - 4.9 mg/dL    Albumin 4.1 3.4 - 5.0 g/dL   Hemoglobin A1C    Collection Time: 08/21/23 10:09 AM   Result Value Ref Range    Hemoglobin A1C 6.2 (A) %    Estimated Average Glucose 131 MG/DL   CBC and Auto Differential    Collection Time: 08/21/23 10:09 AM   Result Value Ref Range    WBC 6.2 4.4 - 11.3 x10E9/L    RBC 4.42 (L) 4.50 - 5.90 x10E12/L    Hemoglobin 12.3 (L) 13.5 - 17.5 g/dL    Hematocrit 41.5 41.0 - 52.0 %    MCV 94 80 - 100 fL    MCHC 29.6 (L) 32.0 - 36.0 g/dL    Platelets 217 150 - 450 x10E9/L    RDW 17.2 (H) 11.5 - 14.5 %    Neutrophils % 61.4 40.0 - 80.0 %    Immature Granulocytes %, Automated 0.2 0.0 - 0.9 %    Lymphocytes % 21.8 13.0 - 44.0 %    Monocytes % 11.8 2.0 - 10.0 %    Eosinophils % 4.2 0.0 - 6.0 %    Basophils % 0.6 0.0 - 2.0 %    Neutrophils Absolute 3.79 1.20 - 7.70 x10E9/L    Lymphocytes Absolute 1.35 1.20 - 4.80 x10E9/L    Monocytes Absolute 0.73 0.10 - 1.00 x10E9/L    Eosinophils Absolute 0.26 0.00 - 0.70 x10E9/L    Basophils Absolute 0.04 0.00 - 0.10 x10E9/L   Comprehensive Metabolic Panel    Collection Time: 08/21/23 10:09 AM   Result Value Ref Range    Glucose 116 (H) 74 - 99 mg/dL    Sodium 138 136 - 145 mmol/L    Potassium 4.6 3.5 - 5.3 mmol/L    Chloride 104 98 - 107 mmol/L    Bicarbonate 27 21 - 32 mmol/L    Anion Gap 12 10 - 20 mmol/L    Urea Nitrogen 16 6 - 23 mg/dL    Creatinine 0.75  0.50 - 1.30 mg/dL    GFR MALE >90 >90 mL/min/1.73m2    Calcium 9.3 8.6 - 10.3 mg/dL    Albumin 4.0 3.4 - 5.0 g/dL    Alkaline Phosphatase 43 33 - 136 U/L    Total Protein 6.9 6.4 - 8.2 g/dL    AST 26 9 - 39 U/L    Total Bilirubin 0.5 0.0 - 1.2 mg/dL    ALT (SGPT) 28 10 - 52 U/L   Testosterone    Collection Time: 08/21/23 10:09 AM   Result Value Ref Range    Testosterone 96 (L) 240 - 1000 ng/dL   Testosterone    Collection Time: 09/05/23  8:07 AM   Result Value Ref Range    Testosterone 75 (L) 240 - 1000 ng/dL   POCT INR manually resulted    Collection Time: 09/05/23  3:34 PM   Result Value Ref Range    POC INR 4.0 (A) 0.9 - 1.1   Tissue/Wound Culture/Smear    Collection Time: 09/13/23 11:40 AM    Specimen: Tissue/Wound    L 2ND TOE   Result Value Ref Range    Gram Stain       2+ GRANULOCYTES.~4+ GRAM (-) BACILLI.  2+ GRAM (+) COCCI    Tissue/Wound Culture/Smear (A)        4+~AEROBIC MIXED BACTERIA~   ANAEROBIC MIXED BACTERIA    Tissue/Wound Culture/Smear Streptococcus, group C (A)    Basic Metabolic Panel    Collection Time: 10/04/23  9:39 AM   Result Value Ref Range    Glucose 119 (H) 74 - 99 mg/dL    Sodium 137 136 - 145 mmol/L    Potassium 4.4 3.5 - 5.3 mmol/L    Chloride 102 98 - 107 mmol/L    Bicarbonate 26 21 - 32 mmol/L    Anion Gap 13 10 - 20 mmol/L    Urea Nitrogen 12 6 - 23 mg/dL    Creatinine 0.76 0.50 - 1.30 mg/dL    eGFR >90 >60 mL/min/1.73m*2    Calcium 9.6 8.6 - 10.3 mg/dL   CBC    Collection Time: 10/04/23  9:39 AM   Result Value Ref Range    WBC 5.6 4.4 - 11.3 x10*3/uL    nRBC 0.0 0.0 - 0.0 /100 WBCs    RBC 4.26 (L) 4.50 - 5.90 x10*6/uL    Hemoglobin 12.1 (L) 13.5 - 17.5 g/dL    Hematocrit 39.4 (L) 41.0 - 52.0 %    MCV 93 80 - 100 fL    MCH 28.4 26.0 - 34.0 pg    MCHC 30.7 (L) 32.0 - 36.0 g/dL    RDW 16.4 (H) 11.5 - 14.5 %    Platelets 225 150 - 450 x10*3/uL    MPV 9.6 7.5 - 11.5 fL   POCT PT/INR    Collection Time: 10/13/23  7:38 AM   Result Value Ref Range    POCT Prothrombin time 22.0 seconds     POCT INR 1.8    Basic Metabolic Panel    Collection Time: 10/23/23  9:09 AM   Result Value Ref Range    Glucose 116 (H) 74 - 99 mg/dL    Sodium 137 136 - 145 mmol/L    Potassium 4.4 3.5 - 5.3 mmol/L    Chloride 99 98 - 107 mmol/L    Bicarbonate 25 21 - 32 mmol/L    Anion Gap 17 10 - 20 mmol/L    Urea Nitrogen 14 6 - 23 mg/dL    Creatinine 0.81 0.50 - 1.30 mg/dL    eGFR >90 >60 mL/min/1.73m*2    Calcium 9.4 8.6 - 10.3 mg/dL   B-Type Natriuretic Peptide    Collection Time: 10/23/23  9:09 AM   Result Value Ref Range    BNP 12 0 - 99 pg/mL   Magnesium    Collection Time: 10/23/23  9:09 AM   Result Value Ref Range    Magnesium 2.05 1.60 - 2.40 mg/dL   Ferritin    Collection Time: 11/13/23  9:00 AM   Result Value Ref Range    Ferritin 160 20 - 300 ng/mL   Basic Metabolic Panel    Collection Time: 11/20/23  9:21 AM   Result Value Ref Range    Glucose 117 (H) 74 - 99 mg/dL    Sodium 135 (L) 136 - 145 mmol/L    Potassium 4.6 3.5 - 5.3 mmol/L    Chloride 100 98 - 107 mmol/L    Bicarbonate 28 21 - 32 mmol/L    Anion Gap 12 10 - 20 mmol/L    Urea Nitrogen 17 6 - 23 mg/dL    Creatinine 0.80 0.50 - 1.30 mg/dL    eGFR >90 >60 mL/min/1.73m*2    Calcium 9.4 8.6 - 10.3 mg/dL   CBC    Collection Time: 11/20/23  9:21 AM   Result Value Ref Range    WBC 8.2 4.4 - 11.3 x10*3/uL    nRBC 0.0 0.0 - 0.0 /100 WBCs    RBC 4.80 4.50 - 5.90 x10*6/uL    Hemoglobin 12.6 (L) 13.5 - 17.5 g/dL    Hematocrit 42.9 41.0 - 52.0 %    MCV 89 80 - 100 fL    MCH 26.3 26.0 - 34.0 pg    MCHC 29.4 (L) 32.0 - 36.0 g/dL    RDW 18.2 (H) 11.5 - 14.5 %    Platelets 257 150 - 450 x10*3/uL   Protime-INR    Collection Time: 12/11/23  9:58 AM   Result Value Ref Range    Protime 42.7 (H) 9.8 - 12.8 seconds    INR 3.7 (H) 0.9 - 1.1       ECG  Encounter Date: 10/30/23   ECG 12 Lead    Narrative    Sinus rhythm heart rate 91 bpm.       Echocardiogram  No results found for this or any previous visit from the past 1095 days.      CV Studies:  EKG:  Encounter Date: 10/30/23    ECG 12 Lead    Narrative    Sinus rhythm heart rate 91 bpm.     Echocardiogram:   Echocardiogram     Narrative  [place] [id number]  Phone [phone] Fax [phone]    TRANSTHORACIC ECHOCARDIOGRAM REPORT      Patient Name:     POLINA CAMPOS MCCORMACK Reading Physician:   [id number] Latricia Moseley MD  Study Date:       9/25/2023     Referring Physician: FLOR VALLADARES  MRN/PID:          [id number]      PCP:  Accession/Order#: [id number]  Department Location: [facility]  YOB: 1960     Fellow:  Gender:           M             Nurse:               Malu Freeman RN  Admit Date:                     Sonographer:         Ronda Mae RDCS  Admission Status: Outpatient    Additional Staff:  Height:           177.80 cm     CC Report to:  Weight:           169.19 kg     Study Type:          Echocardiogram  BSA:              2.72 m2  Blood Pressure: 170 /84 mmHg    Diagnosis/ICD: I10-Essential (primary) hypertension; R06.02-Shortness of breath  Indication:    Dyspnea on Exertion  Procedure/CPT: Echo Complete w Full [id number]    Patient History:  Pertinent History: HTN.    Study Detail: The following Echo studies were performed: 2D, M-Mode, Doppler and  color flow. Technically challenging study due to body habitus and  prominent lung artifact. Optison used as a contrast agent for  endocardial border definition. Total contrast used for this  procedure was 3 mL via IV push.      PHYSICIAN INTERPRETATION:  Left Ventricle: Left ventricular systolic function is normal, with an estimated ejection fraction of 60-65%. There are no regional wall motion abnormalities. The left ventricular cavity size is normal. Spectral Doppler shows an impaired relaxation pattern of left ventricular diastolic filling.  Left Atrium: The left atrium is normal in size.  Right Ventricle: The right ventricle is normal in size. There is normal right ventricular global systolic  function.  Right Atrium: The right atrium is normal in size.  Aortic Valve: The aortic valve was not well visualized. There is no evidence of aortic valve regurgitation. The peak instantaneous gradient of the aortic valve is 12.7 mmHg. The mean gradient of the aortic valve is 6.9 mmHg.  Mitral Valve: The mitral valve is normal in structure. There is no evidence of mitral valve regurgitation.  Tricuspid Valve: The tricuspid valve is structurally normal. No evidence of tricuspid regurgitation.  Pulmonic Valve: The pulmonic valve is structurally normal. There is no indication of pulmonic valve regurgitation.  Pericardium: There is no pericardial effusion noted.  Aorta: The aortic root is normal.      CONCLUSIONS:  1. Left ventricular systolic function is normal with a 60-65% estimated ejection fraction.  2. Poorly visualized anatomical structures due to suboptimal image quality.  3. Spectral Doppler shows an impaired relaxation pattern of left ventricular diastolic filling.    QUANTITATIVE DATA SUMMARY:  2D MEASUREMENTS:  Normal Ranges:  LAs:           4.45 cm   (2.7-4.0cm)  IVSd:          0.75 cm   (0.6-1.1cm)  LVPWd:         0.82 cm   (0.6-1.1cm)  LVIDd:         5.83 cm   (3.9-5.9cm)  LVIDs:         4.67 cm  LV Mass Index: 63.6 g/m2  LV % FS        19.9 %    LA VOLUME:  Normal Ranges:  LA Vol A4C:        79.5 ml    (22+/-6mL/m2)  LA Vol A2C:        67.4 ml  LA Vol BP:         77.9 ml  LA Vol Index A4C:  29.2 ml/m2  LA Vol Index A2C:  24.8 ml/m2  LA Vol Index BP:   28.6 ml/m2  LA Area A4C:       25.4 cm2  LA Area A2C:       22.0 cm2  LA Major Axis A4C: 6.9 cm  LA Major Axis A2C: 6.1 cm  LA Volume Index:   28.6 ml/m2  LA Vol A4C:        74.6 ml  LA Vol A2C:        60.4 ml    RA VOLUME BY A/L METHOD:  Normal Ranges:  RA Area A4C: 14.9 cm2    AORTA MEASUREMENTS:  Normal Ranges:  Ao Sinus, d: 3.50 cm (2.1-3.5cm)  Ao STJ, d:   2.70 cm (1.7-3.4cm)  Asc Ao, d:   3.50 cm (2.1-3.4cm)    LV SYSTOLIC FUNCTION BY 2D PLANIMETRY  (MOD):  Normal Ranges:  EF-A4C View: 64.4 % (>=55%)  EF-A2C View: 57.6 %  EF-Biplane:  59.7 %    LV DIASTOLIC FUNCTION:  Normal Ranges:  MV Peak E:    0.82 m/s    (0.7-1.2 m/s)  MV Peak A:    1.13 m/s    (0.42-0.7 m/s)  E/A Ratio:    0.73        (1.0-2.2)  MV e'         0.10 m/s    (>8.0)  MV lateral e' 0.11 m/s  MV medial e'  0.10 m/s  MV A Dur:     131.30 msec  E/e' Ratio:   7.85        (<8.0)    MITRAL VALVE:  Normal Ranges:  MV DT: 205 msec (150-240msec)    AORTIC VALVE:  Normal Ranges:  AoV Vmax:                1.78 m/s  (<=1.7m/s)  AoV Peak P.7 mmHg (<20mmHg)  AoV Mean P.9 mmHg  (1.7-11.5mmHg)  LVOT Max Franck:            1.02 m/s  (<=1.1m/s)  AoV VTI:                 35.30 cm  (18-25cm)  LVOT VTI:                21.21 cm  LVOT Diameter:           2.22 cm   (1.8-2.4cm)  AoV Area, VTI:           2.32 cm2  (2.5-5.5cm2)  AoV Area,Vmax:           2.21 cm2  (2.5-4.5cm2)  AoV Dimensionless Index: 0.60      RIGHT VENTRICLE:  RV Basal 3.00 cm  RV Mid   2.40 cm  RV Major 8.4 cm  TAPSE:   22.2 mm  RV s'    0.13 m/s    AORTA:  Asc Ao Diam 3.54 cm      [id number] Latricia Moseley MD  Electronically signed on 2023 at 11:37:05 AM         Final     Stress Testing IMGRESULT([id number]:1:1825): No results found for this or any previous visit from the past  days.    Cardiac Catheterization: No results found for this or any previous visit from the past  days.  No results found for this or any previous visit from the past 3650 days.     Cardiac Scoring:   CT heart calcium scoring wo IV contrast 2022    Narrative  MRN: [id number]  Patient Name: POLINA MCCORMACK    STUDY:  CT CARDIAC SCORING;  2022 10:25 am    INDICATION:  cardiac screening  Z13.6: Screening for cardiovascular condition.    COMPARISON:  None.    ACCESSION NUMBER(S):  [id number]    ORDERING CLINICIAN:  ALYSON RAMIREZ    TECHNIQUE:  Using prospective ECG gating, CT scan of the coronary arteries was  performed without  "intravenous contrast. Coronary calcium scoring  was  performed according to the method of Agatston.    FINDINGS:  The score and distribution of calcium in the coronary arteries is as  follows:    LM 0,  LAD 20.27,  LCx 0,  RCA 0,    Total 20.27    The visualized mid/lower ascending thoracic aorta measures 3.7 cm in  diameter. The heart is normal in size. No pericardial effusion is  present.    No gross evidence of mediastinal or hilar lymphadenopathy or masses  is identified. The visualized segments of the lungs are normally  expanded.    The visualized subdiaphragmatic structures appear intact. Fatty liver.    Impression  1. Coronary artery calcium score of 20.27*.  2. Fatty liver.    *Coronary artery calcium scoring may be helpful in predicting the  risk for future coronary heart disease events.  According to the  American College of Cardiology Foundation Clinical Expert Consensus  Task Force, such testing provides important prognostic information in  patients with more than one coronary heart disease risk factor. The  coronary artery calcium score correlates with the annual risk of a  non-fatal myocardial infarction or coronary heart disease death.    Coronary artery score            Annual Risk    0-99                             0.4%  100-399                        1.3%  >400                            2.4%    These three \"breakpoints\" correspond to lower, intermediate and high  risk states for future coronary events.  Such information should be  used, along with appropriate clinical judgment, to make decisions  regarding the intensity of risk factor management strategies to treat  blood lipids and to modify other non-lipid coronary risk factors.    Reference: [place] P et al. Circulation.  2007; 115:402-426    AAA : No results found for this or any previous visit from the past 1825 days.    OTHER: No results found for this or any previous visit from the past 1825 days.    LAST IMAGING RESULTS  FL less than 1 " hour  These images are not reportable by radiology and will not be interpreted   by  Radiologists.    Problem List Items Addressed This Visit       Chronic venous stasis dermatitis of both lower extremities    HTN (hypertension)    Other fatigue    Morbid obesity (CMS/HCC)    Coronary artery disease involving native coronary artery of native heart    (HFpEF) heart failure with preserved ejection fraction (CMS/HCC) - Primary    RADHA (obstructive sleep apnea)    Overview     9/28/23 Split night PSG ([place], BMI 56.7, ESS 23), all-supine, no optimal pressure up to CPAP 20 cm H20 (all hypopneas), switched to biPAP, centrals emerged (last obs ap at EPAP 14 cm H20--? mixed apnea); no optimal pressure noted. (+) PLMs noted.               Gorge Medeiros DO, FACC, FACOI

## 2024-01-18 ENCOUNTER — APPOINTMENT (OUTPATIENT)
Dept: WOUND CARE | Facility: CLINIC | Age: 64
End: 2024-01-18
Payer: COMMERCIAL

## 2024-01-18 DIAGNOSIS — I48.11 LONGSTANDING PERSISTENT ATRIAL FIBRILLATION (MULTI): ICD-10-CM

## 2024-01-18 RX ORDER — WARFARIN 4 MG/1
TABLET ORAL
Qty: 30 TABLET | Refills: 11 | Status: SHIPPED | OUTPATIENT
Start: 2024-01-18

## 2024-01-22 ENCOUNTER — OFFICE VISIT (OUTPATIENT)
Dept: CARDIOLOGY | Facility: CLINIC | Age: 64
End: 2024-01-22
Payer: COMMERCIAL

## 2024-01-22 VITALS
SYSTOLIC BLOOD PRESSURE: 132 MMHG | HEART RATE: 75 BPM | HEIGHT: 70 IN | WEIGHT: 315 LBS | DIASTOLIC BLOOD PRESSURE: 82 MMHG | BODY MASS INDEX: 45.1 KG/M2

## 2024-01-22 DIAGNOSIS — E66.01 MORBID OBESITY (MULTI): ICD-10-CM

## 2024-01-22 DIAGNOSIS — I10 PRIMARY HYPERTENSION: ICD-10-CM

## 2024-01-22 DIAGNOSIS — R53.82 CHRONIC FATIGUE: ICD-10-CM

## 2024-01-22 DIAGNOSIS — R40.0 DAYTIME SOMNOLENCE: ICD-10-CM

## 2024-01-22 DIAGNOSIS — I50.32 CHRONIC HEART FAILURE WITH PRESERVED EJECTION FRACTION (MULTI): Primary | ICD-10-CM

## 2024-01-22 DIAGNOSIS — R06.02 SOBOE (SHORTNESS OF BREATH ON EXERTION): ICD-10-CM

## 2024-01-22 DIAGNOSIS — G47.33 OSA (OBSTRUCTIVE SLEEP APNEA): ICD-10-CM

## 2024-01-22 DIAGNOSIS — R07.89 OTHER CHEST PAIN: ICD-10-CM

## 2024-01-22 DIAGNOSIS — R53.83 OTHER FATIGUE: ICD-10-CM

## 2024-01-22 DIAGNOSIS — I25.10 CORONARY ARTERY DISEASE INVOLVING NATIVE CORONARY ARTERY OF NATIVE HEART WITHOUT ANGINA PECTORIS: ICD-10-CM

## 2024-01-22 DIAGNOSIS — I87.2 CHRONIC VENOUS STASIS DERMATITIS OF BOTH LOWER EXTREMITIES: ICD-10-CM

## 2024-01-22 PROCEDURE — 3079F DIAST BP 80-89 MM HG: CPT | Performed by: INTERNAL MEDICINE

## 2024-01-22 PROCEDURE — 3008F BODY MASS INDEX DOCD: CPT | Performed by: INTERNAL MEDICINE

## 2024-01-22 PROCEDURE — 3075F SYST BP GE 130 - 139MM HG: CPT | Performed by: INTERNAL MEDICINE

## 2024-01-22 PROCEDURE — 4010F ACE/ARB THERAPY RXD/TAKEN: CPT | Performed by: INTERNAL MEDICINE

## 2024-01-22 PROCEDURE — 1036F TOBACCO NON-USER: CPT | Performed by: INTERNAL MEDICINE

## 2024-01-22 PROCEDURE — 99214 OFFICE O/P EST MOD 30 MIN: CPT | Performed by: INTERNAL MEDICINE

## 2024-01-22 PROCEDURE — 93000 ELECTROCARDIOGRAM COMPLETE: CPT | Performed by: INTERNAL MEDICINE

## 2024-01-22 RX ORDER — DILTIAZEM HYDROCHLORIDE 120 MG/1
120 CAPSULE, COATED, EXTENDED RELEASE ORAL DAILY
Qty: 90 CAPSULE | Refills: 1 | Status: SHIPPED | OUTPATIENT
Start: 2024-01-22 | End: 2025-01-21

## 2024-01-22 RX ORDER — SPIRONOLACTONE 25 MG/1
25 TABLET ORAL DAILY
Qty: 180 TABLET | Refills: 1 | Status: SHIPPED | OUTPATIENT
Start: 2024-01-22 | End: 2025-01-21

## 2024-01-26 ENCOUNTER — CLINICAL SUPPORT (OUTPATIENT)
Dept: SLEEP MEDICINE | Facility: CLINIC | Age: 64
End: 2024-01-26
Payer: COMMERCIAL

## 2024-01-26 ENCOUNTER — LAB (OUTPATIENT)
Dept: LAB | Facility: LAB | Age: 64
End: 2024-01-26
Payer: COMMERCIAL

## 2024-01-26 VITALS
WEIGHT: 315 LBS | BODY MASS INDEX: 45.1 KG/M2 | DIASTOLIC BLOOD PRESSURE: 82 MMHG | HEIGHT: 70 IN | SYSTOLIC BLOOD PRESSURE: 132 MMHG

## 2024-01-26 DIAGNOSIS — G47.61 PERIODIC LIMB MOVEMENT DISORDER: ICD-10-CM

## 2024-01-26 DIAGNOSIS — G47.33 OSA (OBSTRUCTIVE SLEEP APNEA): ICD-10-CM

## 2024-01-26 DIAGNOSIS — I48.11 LONGSTANDING PERSISTENT ATRIAL FIBRILLATION (MULTI): ICD-10-CM

## 2024-01-26 DIAGNOSIS — G47.10 HYPERSOMNIA, UNSPECIFIED: ICD-10-CM

## 2024-01-26 DIAGNOSIS — G47.31 CENTRAL SLEEP APNEA: ICD-10-CM

## 2024-01-26 DIAGNOSIS — G47.37 CENTRAL SLEEP APNEA IN CONDITIONS CLASSIFIED ELSEWHERE: ICD-10-CM

## 2024-01-26 LAB
INR PPP: 2.6 (ref 0.9–1.1)
PROTHROMBIN TIME: 29.6 SECONDS (ref 9.8–12.8)

## 2024-01-26 PROCEDURE — 85610 PROTHROMBIN TIME: CPT

## 2024-01-26 PROCEDURE — 36415 COLL VENOUS BLD VENIPUNCTURE: CPT

## 2024-01-26 PROCEDURE — 95811 POLYSOM 6/>YRS CPAP 4/> PARM: CPT | Performed by: PSYCHIATRY & NEUROLOGY

## 2024-01-26 ASSESSMENT — SLEEP AND FATIGUE QUESTIONNAIRES
HOW LIKELY ARE YOU TO NOD OFF OR FALL ASLEEP WHILE WATCHING TV: HIGH CHANCE OF DOZING
HOW LIKELY ARE YOU TO NOD OFF OR FALL ASLEEP WHILE SITTING AND READING: HIGH CHANCE OF DOZING
HOW LIKELY ARE YOU TO NOD OFF OR FALL ASLEEP IN A CAR, WHILE STOPPED FOR A FEW MINUTES IN TRAFFIC: MODERATE CHANCE OF DOZING
SITING INACTIVE IN A PUBLIC PLACE LIKE A CLASS ROOM OR A MOVIE THEATER: HIGH CHANCE OF DOZING
HOW LIKELY ARE YOU TO NOD OFF OR FALL ASLEEP WHILE SITTING QUIETLY AFTER LUNCH WITHOUT ALCOHOL: HIGH CHANCE OF DOZING
HOW LIKELY ARE YOU TO NOD OFF OR FALL ASLEEP WHILE LYING DOWN TO REST IN THE AFTERNOON WHEN CIRCUMSTANCES PERMIT: HIGH CHANCE OF DOZING
ESS-CHAD TOTAL SCORE: 23
HOW LIKELY ARE YOU TO NOD OFF OR FALL ASLEEP WHEN YOU ARE A PASSENGER IN A CAR FOR AN HOUR WITHOUT A BREAK: HIGH CHANCE OF DOZING
HOW LIKELY ARE YOU TO NOD OFF OR FALL ASLEEP WHILE SITTING AND TALKING TO SOMEONE: HIGH CHANCE OF DOZING

## 2024-01-27 NOTE — PROGRESS NOTES
[facility] TECH NOTE:     Patient: Hugo Gauthier   MRN//AGE: [id number]  1960  63 y.o.   Technologist: PAMELA Stearns   Room: 3   Service Date: 2024        [facility]    TECHNOLOGIST SLEEP STUDY PROCEDURE NOTE:   This sleep study is being conducted according to the policies and procedures outlined by the AAS accreditation standards.  The sleep study procedure and processes involved during this appointment was explained to the patient/patient’s family, questions were answered. The patient/family verbalized understanding.      The patient is a 63 y.o. year old male scheduled for aASV titration with montage of:  standard . he arrived for his appointment.      The study that was ultimately completed was aASV titration with montage of:  standard .    The full study Was completed.  Patient questionnaires completed?: yes     Consents signed? yes    Initial Fall Risk Screening:     Hugo has fallen in the last 6 months. his did not result in injury. Hugo does not have a fear of falling. He does not need assistance with sitting, standing, or walking. he does not need assistance walking in his home. he does not need assistance in an unfamiliar setting. The patient is not using an assistive device.     Brief Study observations: The patient was started on ASV at pressures of Min EPAP-8-9/Max EPAP -20-25/Min PS-5-0/ Max PS-17-15/ Max pressure -25. The head of bed was elevated from 10 to 20 degrees. Two masks were used-Marlon Nuance nasal gel masal and Marlon Dreamwisp -Medium.  Respiratory events and snoring occurred throughout.  He tolerated the PAP therapy well.    Deviation to order/protocol and reason: NA      If PAP, which was preferred mask/pressure/mode: Marlon Dreamwisp Nasal mask -Medium      Other:None    After the procedure, the patient/family was informed to ensure followup with ordering clinician for testing results.      Technologist: Salima Lancaster  RPSGT

## 2024-01-30 ENCOUNTER — OFFICE VISIT (OUTPATIENT)
Dept: PRIMARY CARE | Facility: CLINIC | Age: 64
End: 2024-01-30
Payer: COMMERCIAL

## 2024-01-30 VITALS
HEIGHT: 70 IN | BODY MASS INDEX: 45.1 KG/M2 | WEIGHT: 315 LBS | DIASTOLIC BLOOD PRESSURE: 74 MMHG | HEART RATE: 98 BPM | OXYGEN SATURATION: 95 % | SYSTOLIC BLOOD PRESSURE: 134 MMHG | RESPIRATION RATE: 18 BRPM

## 2024-01-30 DIAGNOSIS — E29.1 HYPOGONADISM MALE: ICD-10-CM

## 2024-01-30 DIAGNOSIS — B37.82 CANDIDIASIS OF INTESTINE: ICD-10-CM

## 2024-01-30 DIAGNOSIS — G47.33 OSA (OBSTRUCTIVE SLEEP APNEA): ICD-10-CM

## 2024-01-30 DIAGNOSIS — D50.0 ANEMIA DUE TO BLOOD LOSS: Primary | ICD-10-CM

## 2024-01-30 DIAGNOSIS — I87.1 MAY-THURNER SYNDROME: ICD-10-CM

## 2024-01-30 DIAGNOSIS — I10 PRIMARY HYPERTENSION: ICD-10-CM

## 2024-01-30 DIAGNOSIS — I48.0 PAROXYSMAL ATRIAL FIBRILLATION (MULTI): ICD-10-CM

## 2024-01-30 DIAGNOSIS — N52.9 ERECTILE DYSFUNCTION, UNSPECIFIED ERECTILE DYSFUNCTION TYPE: ICD-10-CM

## 2024-01-30 DIAGNOSIS — I89.0 LYMPHEDEMA: ICD-10-CM

## 2024-01-30 DIAGNOSIS — E11.9 DIET-CONTROLLED DIABETES MELLITUS (MULTI): ICD-10-CM

## 2024-01-30 DIAGNOSIS — Z86.39 HISTORY OF HYPERTHYROIDISM: ICD-10-CM

## 2024-01-30 DIAGNOSIS — G89.29 CHRONIC BILATERAL LOW BACK PAIN WITHOUT SCIATICA: ICD-10-CM

## 2024-01-30 DIAGNOSIS — M54.50 CHRONIC BILATERAL LOW BACK PAIN WITHOUT SCIATICA: ICD-10-CM

## 2024-01-30 DIAGNOSIS — M62.838 MUSCLE SPASM OF RIGHT LOWER EXTREMITY: ICD-10-CM

## 2024-01-30 DIAGNOSIS — E66.01 CLASS 3 SEVERE OBESITY DUE TO EXCESS CALORIES WITH SERIOUS COMORBIDITY AND BODY MASS INDEX (BMI) OF 50.0 TO 59.9 IN ADULT (MULTI): ICD-10-CM

## 2024-01-30 DIAGNOSIS — I50.32 CHRONIC HEART FAILURE WITH PRESERVED EJECTION FRACTION (MULTI): ICD-10-CM

## 2024-01-30 DIAGNOSIS — I48.11 LONGSTANDING PERSISTENT ATRIAL FIBRILLATION (MULTI): ICD-10-CM

## 2024-01-30 PROCEDURE — 3008F BODY MASS INDEX DOCD: CPT | Performed by: FAMILY MEDICINE

## 2024-01-30 PROCEDURE — 3075F SYST BP GE 130 - 139MM HG: CPT | Performed by: FAMILY MEDICINE

## 2024-01-30 PROCEDURE — 1036F TOBACCO NON-USER: CPT | Performed by: FAMILY MEDICINE

## 2024-01-30 PROCEDURE — 4010F ACE/ARB THERAPY RXD/TAKEN: CPT | Performed by: FAMILY MEDICINE

## 2024-01-30 PROCEDURE — 99215 OFFICE O/P EST HI 40 MIN: CPT | Performed by: FAMILY MEDICINE

## 2024-01-30 PROCEDURE — 3078F DIAST BP <80 MM HG: CPT | Performed by: FAMILY MEDICINE

## 2024-01-30 RX ORDER — TIZANIDINE 2 MG/1
2 TABLET ORAL NIGHTLY
Qty: 30 TABLET | Refills: 1 | Status: SHIPPED | OUTPATIENT
Start: 2024-01-30 | End: 2024-03-27

## 2024-01-30 RX ORDER — NYSTATIN 100000 [USP'U]/ML
500000 SUSPENSION ORAL 4 TIMES DAILY
Qty: 280 ML | Refills: 0 | Status: SHIPPED | OUTPATIENT
Start: 2024-01-30 | End: 2024-02-13

## 2024-01-30 RX ORDER — NYSTATIN 100000 U/G
CREAM TOPICAL 2 TIMES DAILY
Qty: 60 G | Refills: 1 | Status: SHIPPED | OUTPATIENT
Start: 2024-01-30 | End: 2024-02-06

## 2024-01-30 RX ORDER — SILDENAFIL 100 MG/1
100 TABLET, FILM COATED ORAL DAILY PRN
Qty: 30 TABLET | Refills: 0 | Status: SHIPPED | OUTPATIENT
Start: 2024-01-30 | End: 2025-01-29

## 2024-01-30 ASSESSMENT — ENCOUNTER SYMPTOMS
AGITATION: 1
WOUND: 1
OCCASIONAL FEELINGS OF UNSTEADINESS: 0
DEPRESSION: 0
LOSS OF SENSATION IN FEET: 0

## 2024-01-30 ASSESSMENT — PATIENT HEALTH QUESTIONNAIRE - PHQ9
2. FEELING DOWN, DEPRESSED OR HOPELESS: NOT AT ALL
1. LITTLE INTEREST OR PLEASURE IN DOING THINGS: NOT AT ALL
SUM OF ALL RESPONSES TO PHQ9 QUESTIONS 1 AND 2: 0

## 2024-01-30 NOTE — PROGRESS NOTES
Subjective   Patient ID: Hugo Gauthier is a 63 y.o. male.    HPI  Hugo is here for follow up. He is frustrated with his bill from wound care in that he is 20,000 in the hole. This is frustrated with his care, He is doing PT and wound care in [facility].Last INR 1/22/2024. He is very aggravated today. His blood pressure is better controlled. He strained his back at work and has been on short term disability, he has seen Dr. Landers, and is doing physical therapy, he will return to work next week on light duty 40 hours per week.   Muscle in leg is knotting up at night,   Review of Systems   Cardiovascular:  Positive for leg swelling.   Skin:  Positive for wound.   Psychiatric/Behavioral:  Positive for agitation.    All other systems reviewed and are negative.  Weight is down 15 pounds, he is trying to est less carbs, and more vegetables. Less high carbs.   Using spray from homeopathic medicine to improve frequency- it previously improved his arteries, and now he would like to improve his veins.   Objective   Physical Exam  Vitals reviewed.   Constitutional:       Appearance: Normal appearance.   HENT:      Head: Normocephalic and atraumatic.      Right Ear: Tympanic membrane normal.      Left Ear: Tympanic membrane normal.      Nose: Nose normal.      Mouth/Throat:      Mouth: Mucous membranes are moist.      Pharynx: Oropharynx is clear.   Eyes:      Extraocular Movements: Extraocular movements intact.      Conjunctiva/sclera: Conjunctivae normal.      Pupils: Pupils are equal, round, and reactive to light.   Cardiovascular:      Rate and Rhythm: Normal rate and regular rhythm.      Pulses: Normal pulses.      Heart sounds: Normal heart sounds.   Pulmonary:      Effort: Pulmonary effort is normal.      Breath sounds: Normal breath sounds.   Abdominal:      General: Abdomen is flat. Bowel sounds are normal.      Palpations: Abdomen is soft.   Musculoskeletal:         General: Normal range of motion.      Cervical  back: Normal range of motion and neck supple.   Skin:     General: Skin is warm and dry.      Capillary Refill: Capillary refill takes less than 2 seconds.   Neurological:      General: No focal deficit present.      Mental Status: He is alert and oriented to person, place, and time.   Psychiatric:         Mood and Affect: Mood normal.         Behavior: Behavior normal.         Assessment/Plan   Diagnoses and all orders for this visit:  Anemia due to blood loss  -     Hemoglobin A1C; Future  -     CBC and Auto Differential; Future  -     Comprehensive Metabolic Panel; Future  -     Lipid Panel; Future  -     TSH with reflex to Free T4 if abnormal; Future  -     Prostate Specific Antigen; Future  Primary hypertension  -     Hemoglobin A1C; Future  -     CBC and Auto Differential; Future  -     Comprehensive Metabolic Panel; Future  -     Lipid Panel; Future  -     TSH with reflex to Free T4 if abnormal; Future  -     Prostate Specific Antigen; Future  Chronic bilateral low back pain without sciatica  -     Hemoglobin A1C; Future  -     CBC and Auto Differential; Future  -     Comprehensive Metabolic Panel; Future  -     Lipid Panel; Future  -     TSH with reflex to Free T4 if abnormal; Future  -     Prostate Specific Antigen; Future  Class 3 severe obesity due to excess calories with serious comorbidity and body mass index (BMI) of 50.0 to 59.9 in adult (CMS/[facility])  -     Hemoglobin A1C; Future  -     CBC and Auto Differential; Future  -     Comprehensive Metabolic Panel; Future  -     Lipid Panel; Future  -     TSH with reflex to Free T4 if abnormal; Future  -     Prostate Specific Antigen; Future  History of hyperthyroidism  -     Hemoglobin A1C; Future  -     CBC and Auto Differential; Future  -     Comprehensive Metabolic Panel; Future  -     Lipid Panel; Future  -     TSH with reflex to Free T4 if abnormal; Future  -     Prostate Specific Antigen; Future  Diet-controlled diabetes mellitus (CMS/[facility])  -      Hemoglobin A1C; Future  -     CBC and Auto Differential; Future  -     Comprehensive Metabolic Panel; Future  -     Lipid Panel; Future  -     TSH with reflex to Free T4 if abnormal; Future  -     Prostate Specific Antigen; Future  RADHA (obstructive sleep apnea)  -     Hemoglobin A1C; Future  -     CBC and Auto Differential; Future  -     Comprehensive Metabolic Panel; Future  -     Lipid Panel; Future  -     TSH with reflex to Free T4 if abnormal; Future  -     Prostate Specific Antigen; Future  May-Thurner syndrome  Longstanding persistent atrial fibrillation (CMS/HCC)  Chronic heart failure with preserved ejection fraction (CMS/HCC)  Lymphedema  Hypogonadism male  -     Testosterone; Future  Candidiasis of intestine  -     nystatin (Mycostatin) 100,000 unit/mL suspension; Take 5 mL (500,000 Units) by mouth 4 times a day for 14 days. Swish in mouth and swallow.  -     nystatin (Mycostatin) cream; Apply topically 2 times a day for 7 days. apply to affected area  Muscle spasm of right lower extremity  -     tiZANidine (Zanaflex) 2 mg tablet; Take 1 tablet (2 mg) by mouth once daily at bedtime.  Short term disability ends on 4/06/2024. He is able to work and adjust with his employer as light duty. 40 hours per week.

## 2024-01-30 NOTE — PATIENT INSTRUCTIONS
Assessment/Plan   Diagnoses and all orders for this visit:  Anemia due to blood loss  -     Hemoglobin A1C; Future  -     CBC and Auto Differential; Future  -     Comprehensive Metabolic Panel; Future  -     Lipid Panel; Future  -     TSH with reflex to Free T4 if abnormal; Future  -     Prostate Specific Antigen; Future  Primary hypertension  -     Hemoglobin A1C; Future  -     CBC and Auto Differential; Future  -     Comprehensive Metabolic Panel; Future  -     Lipid Panel; Future  -     TSH with reflex to Free T4 if abnormal; Future  -     Prostate Specific Antigen; Future  Chronic bilateral low back pain without sciatica  -     Hemoglobin A1C; Future  -     CBC and Auto Differential; Future  -     Comprehensive Metabolic Panel; Future  -     Lipid Panel; Future  -     TSH with reflex to Free T4 if abnormal; Future  -     Prostate Specific Antigen; Future  Class 3 severe obesity due to excess calories with serious comorbidity and body mass index (BMI) of 50.0 to 59.9 in adult (CMS/[facility])  -     Hemoglobin A1C; Future  -     CBC and Auto Differential; Future  -     Comprehensive Metabolic Panel; Future  -     Lipid Panel; Future  -     TSH with reflex to Free T4 if abnormal; Future  -     Prostate Specific Antigen; Future  History of hyperthyroidism  -     Hemoglobin A1C; Future  -     CBC and Auto Differential; Future  -     Comprehensive Metabolic Panel; Future  -     Lipid Panel; Future  -     TSH with reflex to Free T4 if abnormal; Future  -     Prostate Specific Antigen; Future  Diet-controlled diabetes mellitus (CMS/[facility])  -     Hemoglobin A1C; Future  -     CBC and Auto Differential; Future  -     Comprehensive Metabolic Panel; Future  -     Lipid Panel; Future  -     TSH with reflex to Free T4 if abnormal; Future  -     Prostate Specific Antigen; Future  RADHA (obstructive sleep apnea)  -     Hemoglobin A1C; Future  -     CBC and Auto Differential; Future  -     Comprehensive Metabolic Panel; Future  -      Lipid Panel; Future  -     TSH with reflex to Free T4 if abnormal; Future  -     Prostate Specific Antigen; Future  May-Thurner syndrome  Longstanding persistent atrial fibrillation (CMS/HCC)  Chronic heart failure with preserved ejection fraction (CMS/HCC)  Lymphedema  Hypogonadism male  -     Testosterone; Future  Candidiasis of intestine  -     nystatin (Mycostatin) 100,000 unit/mL suspension; Take 5 mL (500,000 Units) by mouth 4 times a day for 14 days. Swish in mouth and swallow.  -     nystatin (Mycostatin) cream; Apply topically 2 times a day for 7 days. apply to affected area  Muscle spasm of right lower extremity  -     tiZANidine (Zanaflex) 2 mg tablet; Take 1 tablet (2 mg) by mouth once daily at bedtime.  Short term disability ends on 4/06/2024. He is able to work and adjust with his employer as light duty. 40 hours per week.   I know how frustrating your health is right now- hang in there, hopefully we will see steady improvement. Recheck six months.     Gave sildenafil, to try for erectile dysfunction. Start with 1/2 to 1 tab when needed.

## 2024-02-02 ENCOUNTER — OFFICE VISIT (OUTPATIENT)
Dept: NEUROLOGY | Facility: HOSPITAL | Age: 64
End: 2024-02-02
Payer: COMMERCIAL

## 2024-02-02 VITALS
SYSTOLIC BLOOD PRESSURE: 159 MMHG | DIASTOLIC BLOOD PRESSURE: 85 MMHG | BODY MASS INDEX: 51.02 KG/M2 | WEIGHT: 315 LBS | HEART RATE: 85 BPM

## 2024-02-02 DIAGNOSIS — G47.61 PERIODIC LIMB MOVEMENTS OF SLEEP: ICD-10-CM

## 2024-02-02 DIAGNOSIS — G47.33 OSA (OBSTRUCTIVE SLEEP APNEA): Primary | ICD-10-CM

## 2024-02-02 DIAGNOSIS — E66.01 MORBID OBESITY (MULTI): ICD-10-CM

## 2024-02-02 DIAGNOSIS — G47.31 COMPLEX SLEEP APNEA SYNDROME: ICD-10-CM

## 2024-02-02 DIAGNOSIS — G47.31 CENTRAL SLEEP APNEA: ICD-10-CM

## 2024-02-02 PROCEDURE — 1036F TOBACCO NON-USER: CPT | Performed by: PSYCHIATRY & NEUROLOGY

## 2024-02-02 PROCEDURE — 99214 OFFICE O/P EST MOD 30 MIN: CPT | Performed by: PSYCHIATRY & NEUROLOGY

## 2024-02-02 PROCEDURE — 3008F BODY MASS INDEX DOCD: CPT | Performed by: PSYCHIATRY & NEUROLOGY

## 2024-02-02 PROCEDURE — 3077F SYST BP >= 140 MM HG: CPT | Performed by: PSYCHIATRY & NEUROLOGY

## 2024-02-02 PROCEDURE — 4010F ACE/ARB THERAPY RXD/TAKEN: CPT | Performed by: PSYCHIATRY & NEUROLOGY

## 2024-02-02 PROCEDURE — 3079F DIAST BP 80-89 MM HG: CPT | Performed by: PSYCHIATRY & NEUROLOGY

## 2024-02-02 ASSESSMENT — ENCOUNTER SYMPTOMS
OCCASIONAL FEELINGS OF UNSTEADINESS: 0
DEPRESSION: 0
LOSS OF SENSATION IN FEET: 1

## 2024-02-02 ASSESSMENT — PAIN SCALES - GENERAL: PAINLEVEL: 4

## 2024-02-02 NOTE — LETTER
February 3, 2024     Edilma Luna MD  [address]  [place] [address]    Patient: Hugo Gauthier   YOB: 1960   Date of Visit: 2/2/2024       Dear Dr. Edilma Luna MD:    Thank you for referring Hugo Gauthier to me for evaluation. Below are my notes for this consultation.  If you have questions, please do not hesitate to call me. I look forward to following your patient along with you.       Sincerely,     Gerald Hess MD      CC: No Recipients  ______________________________________________________________________________________    Follow-up visit    Visit type: Follow-up    PCP: Edilma Luna MD.    Subjective    Hugo Gauthier is a 63 y.o. year old male here for follow-up. Last seen 11/8/23.     Patient is accompanied by wife.       HPI    I first saw him 11/8/23 for RADHA. States he was having some chronic wound issues in [place]. Saw vascular surgery, who referred him to see cardiologist Dr Medeiros. Among other things, he ordered sleep study. Sleep study done, pt referred here. Tired/sleepy during the daytime. Obese, BMI 50+. Able to sleep supine only per pt due to habitus. Can fall asleep while seated at edge of bed sometimes. (+) drowsy driving. Had 1 sleep study done. States he didn't sleep well. When travelling in past usually cannot sleep well first night in the hotel. Pt states leg movements sometimes a problem sometimes not during sleep.  Former smoker. No alcohol/no street drug use. Last visit, I discussed RADHA and central sleep apnea and recommended biPAP titration.    Studies so far:  9/28/23 Split night PSG ([place], BMI 56.7, ESS 23), all-supine, severe RADHA, no sig hypoxemia. No optimal pressure up to CPAP 20 cm H20 (all hypopneas), switched to biPAP, centrals emerged (last obs ap at EPAP 14 cm H20--? mixed apnea); no optimal pressure noted. (+) PLMs noted.     12/18/23 BiPAP study ([place], BMI 53, ESS 23) showed unsuccessful titration  "up to bilevel PAP titration and bilevel PAP S/T titration 28/24 cm H20 with BUR 10 in this all-supine study. Treatment-emergent central sleep apnea noted. No PLMs. Pt couldn't tolerate higher starting pressure.     1/26/24 ASV study ([place], BMI 51, ESS 23).  Unsuccessful titration, all-supine, with HOB elevated up to 20 degrees. Sleep-related hypoxemia present during study. PLMs noted (PLM index 83.5).     Here today for follow-up.    Has lost 30 lbs in 6 weeks. Has worked with nutritionist in past. Aware about obesity. Has \"read a lot about nutrition\" and is very motivated to keep losing weight, also for his other medical issues.      Patient Active Problem List   Diagnosis   • Atrial fibrillation (CMS/HCC)   • Anemia due to blood loss   • Chronic venous stasis dermatitis of both lower extremities   • History of hyperthyroidism   • HTN (hypertension)   • Chronic lower back pain   • Lesion of lower extremity   • Lymphedema   • Onychomycosis of toenail   • Ulcer, venous stasis (CMS/HCC)   • Diet-controlled diabetes mellitus (CMS/HCC)   • Class 3 severe obesity due to excess calories with serious comorbidity and body mass index (BMI) of 50.0 to 59.9 in adult (CMS/HCC)   • Other fatigue   • Hypogonadism male   • SOBOE (shortness of breath on exertion)   • Chronic fatigue   • Daytime somnolence   • Morbid obesity (CMS/HCC)   • Urinary frequency   • Tinea cruris   • Test anxiety   • Poison ivy dermatitis   • Nevus of scalp   • May-Thurner syndrome   • Actinic keratosis   • Cutaneous horn   • Dysuria   • Blood in urine   • Abnormal nuclear stress test   • History of iron deficiency anemia   • Anticoagulated by anticoagulation treatment   • Coronary artery disease involving native coronary artery of native heart   • (HFpEF) heart failure with preserved ejection fraction (CMS/HCC)   • Other chest pain   • RADHA (obstructive sleep apnea)   • Preoperative cardiovascular examination   • Central sleep apnea   • Periodic limb " movements of sleep   • Iliac vein stenosis, right   • Pubic lice   • Right low back pain   • Candidiasis of intestine   • Muscle spasm of right lower extremity   • Erectile dysfunction       No Known Allergies    Current Outpatient Medications:   •  alpha tocopherol (Vitamin E) 268 mg (400 unit) capsule, Take by mouth., Disp: , Rfl:   •  ascorbic acid (Vitamin C) 1,000 mg tablet, Take 1 tablet (1,000 mg) by mouth once daily., Disp: , Rfl:   •  cholecalciferol (Vitamin D-3) 50 MCG (2000 UT) tablet, Take 1 tablet (2,000 Units) by mouth once daily., Disp: , Rfl:   •  dilTIAZem CD (Cardizem CD) 120 mg 24 hr capsule, Take 1 capsule (120 mg) by mouth once daily., Disp: 90 capsule, Rfl: 1  •  folic acid (Folvite) 800 mcg tablet, Take 1 tablet (800 mcg) by mouth once daily., Disp: , Rfl:   •  furosemide (Lasix) 40 mg tablet, Take 1 tablet (40 mg) by mouth once daily., Disp: 90 tablet, Rfl: 1  •  iodine, bulk, crystals, USE AS DIRECTED., Disp: , Rfl:   •  ketoconazole (NIZOral) 2 % cream, APPLY SPARINGLY TO AFFECTED AREA(S) TWICE DAILY, Disp: 60 g, Rfl: 0  •  lisinopril 40 mg tablet, Take 1 tablet (40 mg) by mouth once daily., Disp: , Rfl:   •  magnesium oxide (Mag-Ox) 400 mg tablet, Take 1 tablet (400 mg) by mouth once daily., Disp: , Rfl:   •  nystatin (Mycostatin) 100,000 unit/gram powder, Apply topically 2 times a day., Disp: 60 g, Rfl: 2  •  nystatin (Mycostatin) 100,000 unit/mL suspension, Take 5 mL (500,000 Units) by mouth 4 times a day for 14 days. Swish in mouth and swallow., Disp: 280 mL, Rfl: 0  •  nystatin (Mycostatin) cream, Apply topically 2 times a day for 7 days. apply to affected area, Disp: 60 g, Rfl: 1  •  selenium 200 mcg tablet, Take 1 tablet (200,000 mcg) by mouth once daily., Disp: , Rfl:   •  silver sulfADIAZINE (Silvadene) 1 % cream, APPLY AND RUB IN A THIN FILM TO AFFECTED AREAS TWICE DAILY.(AM AND PM)., Disp: 85 g, Rfl: 0  •  spironolactone (Aldactone) 25 mg tablet, Take 1 tablet (25 mg) by mouth  "once daily., Disp: 180 tablet, Rfl: 1  •  syringe with needle (BD Luer-Juliette Syringe) 3 mL 25 gauge x 1\" syringe, 1 mL every 14 (fourteen) days. Using syringe. 2 per month., Disp: 2 each, Rfl: 5  •  testosterone cypionate (Depo-Testosterone) 200 mg/mL injection, Inject 1 mL (200 mg) into the shoulder, thigh, or buttocks every 14 (fourteen) days., Disp: 2 mL, Rfl: 5  •  tiZANidine (Zanaflex) 2 mg tablet, Take 1 tablet (2 mg) by mouth once daily at bedtime., Disp: 30 tablet, Rfl: 1  •  warfarin (Coumadin) 1 mg tablet, TAKE 1 TABLET BY MOUTH EVERY DAY, Disp: 30 tablet, Rfl: 0  •  warfarin (Coumadin) 4 mg tablet, 4 mg orally daily (Patient taking differently: 4.5 mg once daily. 4 mg orally daily), Disp: 30 tablet, Rfl: 11  •  zinc gluconate 100 mg tablet, Take 1 tablet (100 mg) by mouth once daily., Disp: , Rfl: 0  •  sildenafil (Viagra) 100 mg tablet, Take 1 tablet (100 mg) by mouth once daily as needed for erectile dysfunction., Disp: 30 tablet, Rfl: 0     Objective    /85   Pulse 85   Wt (!) 161 kg (355 lb 9.6 oz)   BMI 51.02 kg/m²        Awake, alert, oriented x3, in no distress  Well-nourished, ambulatory independently    Mental status exam as above, conversant   Full EOMs intact, no nystagmus, no ptosis   No facial droop   Hearing grossly intact   No dysarthria    Motor strength is at least antigravity on all extremities  Negative Romberg  Normal gait      Lab Results   Component Value Date    WBC 8.2 11/20/2023    RBC 4.80 11/20/2023    HGB 12.6 (L) 11/20/2023    HCT 42.9 11/20/2023     11/20/2023     (L) 11/20/2023    K 4.6 11/20/2023     11/20/2023    BUN 17 11/20/2023    CREATININE 0.80 11/20/2023    EGFR >90 11/20/2023    CALCIUM 9.4 11/20/2023    ALKPHOS 43 08/21/2023    AST 26 08/21/2023    ALT 28 08/21/2023    MG 2.05 10/23/2023    HGBA1C 6.2 (A) 08/21/2023    CHOL 128 01/17/2023    HDL 42.9 01/17/2023    TRIG 227 (H) 01/17/2023    TSH 1.87 10/24/2022        Assessment/Plan  RADHA, " "severe 2023  Central sleep apnea, PAP-emergent 2023  Complex sleep apnea  EF normal  Severe RADHA on diagnostic PSG, no sig hypoxemia noted  No optimal pressure noted even up to BiPAP/BiPAP S/T and ASV titration (even with HOB up 20 deg)--all supine  Pt only able to sleep spine (UNABLE to sleep non-supine due to habitus--per pt, his belly)    Discussed results with pt/spouse    Discussed options going forward:  Second opinion consultation  ? Tracheostomy    4. Morbid obesity  Advised needs serious weight loss  Pt is able to lose weight, and is currently losing weight per him    Also discussed about his sleepiness. He states when he sleeps \"longer\", his sleepiness seems better. The patient knows not to drive when sleepy.     After much discussions, pt wants to continue losing weight in the meantime, then follow-up. Then consider other options. If/when able to sleep non-supine (pt believes he will be able to achieve this once with sig weight loss), can consider repeat PAP titration in non-supine position.    Plans:  discussed options: weight loss, see another opinion, ? Trache  Pt wants to lose wt    Rtc 6 mo    All questions were answered.  Pt knows how to contact my office in case pt has any questions or concerns.    Gerald Hess MD         "

## 2024-02-02 NOTE — PROGRESS NOTES
"Follow-up visit    Visit type: Follow-up    PCP: Edilma Luna MD.    Subjective     Hugo Gauthier is a 63 y.o. year old male here for follow-up. Last seen 11/8/23.     Patient is accompanied by wife.       HPI    I first saw him 11/8/23 for RADHA. States he was having some chronic wound issues in [place]. Saw vascular surgery, who referred him to see cardiologist Dr Medeiros. Among other things, he ordered sleep study. Sleep study done, pt referred here. Tired/sleepy during the daytime. Obese, BMI 50+. Able to sleep supine only per pt due to habitus. Can fall asleep while seated at edge of bed sometimes. (+) drowsy driving. Had 1 sleep study done. States he didn't sleep well. When travelling in past usually cannot sleep well first night in the hotel. Pt states leg movements sometimes a problem sometimes not during sleep.  Former smoker. No alcohol/no street drug use. Last visit, I discussed RADHA and central sleep apnea and recommended biPAP titration.    Studies so far:  9/28/23 Split night PSG ([place], BMI 56.7, ESS 23), all-supine, severe RADHA, no sig hypoxemia. No optimal pressure up to CPAP 20 cm H20 (all hypopneas), switched to biPAP, centrals emerged (last obs ap at EPAP 14 cm H20--? mixed apnea); no optimal pressure noted. (+) PLMs noted.     12/18/23 BiPAP study ([place], BMI 53, ESS 23) showed unsuccessful titration up to bilevel PAP titration and bilevel PAP S/T titration 28/24 cm H20 with BUR 10 in this all-supine study. Treatment-emergent central sleep apnea noted. No PLMs. Pt couldn't tolerate higher starting pressure.     1/26/24 ASV study ([place], BMI 51, ESS 23).  Unsuccessful titration, all-supine, with HOB elevated up to 20 degrees. Sleep-related hypoxemia present during study. PLMs noted (PLM index 83.5).     Here today for follow-up.    Has lost 30 lbs in 6 weeks. Has worked with nutritionist in past. Aware about obesity. Has \"read a lot about nutrition\" and is very motivated to keep losing weight, " also for his other medical issues.      Patient Active Problem List   Diagnosis    Atrial fibrillation (CMS/[facility])    Anemia due to blood loss    Chronic venous stasis dermatitis of both lower extremities    History of hyperthyroidism    HTN (hypertension)    Chronic lower back pain    Lesion of lower extremity    Lymphedema    Onychomycosis of toenail    Ulcer, venous stasis (CMS/[facility])    Diet-controlled diabetes mellitus (CMS/[facility])    Class 3 severe obesity due to excess calories with serious comorbidity and body mass index (BMI) of 50.0 to 59.9 in adult (CMS/[facility])    Other fatigue    Hypogonadism male    SOBOE (shortness of breath on exertion)    Chronic fatigue    Daytime somnolence    Morbid obesity (CMS/[facility])    Urinary frequency    Tinea cruris    Test anxiety    Poison ivy dermatitis    Nevus of scalp    May-Thurner syndrome    Actinic keratosis    Cutaneous horn    Dysuria    Blood in urine    Abnormal nuclear stress test    History of iron deficiency anemia    Anticoagulated by anticoagulation treatment    Coronary artery disease involving native coronary artery of native heart    (HFpEF) heart failure with preserved ejection fraction (CMS/[facility])    Other chest pain    RADHA (obstructive sleep apnea)    Preoperative cardiovascular examination    Central sleep apnea    Periodic limb movements of sleep    Iliac vein stenosis, right    Pubic lice    Right low back pain    Candidiasis of intestine    Muscle spasm of right lower extremity    Erectile dysfunction       No Known Allergies    Current Outpatient Medications:     alpha tocopherol (Vitamin E) 268 mg (400 unit) capsule, Take by mouth., Disp: , Rfl:     ascorbic acid (Vitamin C) 1,000 mg tablet, Take 1 tablet (1,000 mg) by mouth once daily., Disp: , Rfl:     cholecalciferol (Vitamin D-3) 50 MCG (2000 UT) tablet, Take 1 tablet (2,000 Units) by mouth once daily., Disp: , Rfl:     dilTIAZem CD (Cardizem CD) 120 mg 24 hr capsule, Take 1 capsule (120 mg) by mouth  "once daily., Disp: 90 capsule, Rfl: 1    folic acid (Folvite) 800 mcg tablet, Take 1 tablet (800 mcg) by mouth once daily., Disp: , Rfl:     furosemide (Lasix) 40 mg tablet, Take 1 tablet (40 mg) by mouth once daily., Disp: 90 tablet, Rfl: 1    iodine, bulk, crystals, USE AS DIRECTED., Disp: , Rfl:     ketoconazole (NIZOral) 2 % cream, APPLY SPARINGLY TO AFFECTED AREA(S) TWICE DAILY, Disp: 60 g, Rfl: 0    lisinopril 40 mg tablet, Take 1 tablet (40 mg) by mouth once daily., Disp: , Rfl:     magnesium oxide (Mag-Ox) 400 mg tablet, Take 1 tablet (400 mg) by mouth once daily., Disp: , Rfl:     nystatin (Mycostatin) 100,000 unit/gram powder, Apply topically 2 times a day., Disp: 60 g, Rfl: 2    nystatin (Mycostatin) 100,000 unit/mL suspension, Take 5 mL (500,000 Units) by mouth 4 times a day for 14 days. Swish in mouth and swallow., Disp: 280 mL, Rfl: 0    nystatin (Mycostatin) cream, Apply topically 2 times a day for 7 days. apply to affected area, Disp: 60 g, Rfl: 1    selenium 200 mcg tablet, Take 1 tablet (200,000 mcg) by mouth once daily., Disp: , Rfl:     silver sulfADIAZINE (Silvadene) 1 % cream, APPLY AND RUB IN A THIN FILM TO AFFECTED AREAS TWICE DAILY.(AM AND PM)., Disp: 85 g, Rfl: 0    spironolactone (Aldactone) 25 mg tablet, Take 1 tablet (25 mg) by mouth once daily., Disp: 180 tablet, Rfl: 1    syringe with needle (BD Luer-Juliette Syringe) 3 mL 25 gauge x 1\" syringe, 1 mL every 14 (fourteen) days. Using syringe. 2 per month., Disp: 2 each, Rfl: 5    testosterone cypionate (Depo-Testosterone) 200 mg/mL injection, Inject 1 mL (200 mg) into the shoulder, thigh, or buttocks every 14 (fourteen) days., Disp: 2 mL, Rfl: 5    tiZANidine (Zanaflex) 2 mg tablet, Take 1 tablet (2 mg) by mouth once daily at bedtime., Disp: 30 tablet, Rfl: 1    warfarin (Coumadin) 1 mg tablet, TAKE 1 TABLET BY MOUTH EVERY DAY, Disp: 30 tablet, Rfl: 0    warfarin (Coumadin) 4 mg tablet, 4 mg orally daily (Patient taking differently: 4.5 mg " "once daily. 4 mg orally daily), Disp: 30 tablet, Rfl: 11    zinc gluconate 100 mg tablet, Take 1 tablet (100 mg) by mouth once daily., Disp: , Rfl: 0    sildenafil (Viagra) 100 mg tablet, Take 1 tablet (100 mg) by mouth once daily as needed for erectile dysfunction., Disp: 30 tablet, Rfl: 0     Objective     /85   Pulse 85   Wt (!) 161 kg (355 lb 9.6 oz)   BMI 51.02 kg/m²        Awake, alert, oriented x3, in no distress  Well-nourished, ambulatory independently    Mental status exam as above, conversant   Full EOMs intact, no nystagmus, no ptosis   No facial droop   Hearing grossly intact   No dysarthria    Motor strength is at least antigravity on all extremities  Negative Romberg  Normal gait      Lab Results   Component Value Date    WBC 8.2 11/20/2023    RBC 4.80 11/20/2023    HGB 12.6 (L) 11/20/2023    HCT 42.9 11/20/2023     11/20/2023     (L) 11/20/2023    K 4.6 11/20/2023     11/20/2023    BUN 17 11/20/2023    CREATININE 0.80 11/20/2023    EGFR >90 11/20/2023    CALCIUM 9.4 11/20/2023    ALKPHOS 43 08/21/2023    AST 26 08/21/2023    ALT 28 08/21/2023    MG 2.05 10/23/2023    HGBA1C 6.2 (A) 08/21/2023    CHOL 128 01/17/2023    HDL 42.9 01/17/2023    TRIG 227 (H) 01/17/2023    TSH 1.87 10/24/2022        Assessment/Plan   RADHA, severe 2023  Central sleep apnea, PAP-emergent 2023  Complex sleep apnea  EF normal  Severe RADHA on diagnostic PSG, no sig hypoxemia noted  No optimal pressure noted even up to BiPAP/BiPAP S/T and ASV titration (even with HOB up 20 deg)--all supine  Pt only able to sleep spine (UNABLE to sleep non-supine due to habitus--per pt, his belly)    Discussed results with pt/spouse    Discussed options going forward:  Second opinion consultation  ? Tracheostomy    4. Morbid obesity  Advised needs serious weight loss  Pt is able to lose weight, and is currently losing weight per him    Also discussed about his sleepiness. He states when he sleeps \"longer\", his sleepiness " seems better. The patient knows not to drive when sleepy.     After much discussions, pt wants to continue losing weight in the meantime, then follow-up. Then consider other options. If/when able to sleep non-supine (pt believes he will be able to achieve this once with sig weight loss), can consider repeat PAP titration in non-supine position.    Plans:  discussed options: weight loss, see another opinion, ? Trache  Pt wants to lose wt    Rtc 6 mo    All questions were answered.  Pt knows how to contact my office in case pt has any questions or concerns.    Gerald Hess MD

## 2024-02-05 ENCOUNTER — OFFICE VISIT (OUTPATIENT)
Dept: WOUND CARE | Facility: CLINIC | Age: 64
End: 2024-02-05
Payer: COMMERCIAL

## 2024-02-05 ENCOUNTER — TREATMENT (OUTPATIENT)
Dept: PHYSICAL THERAPY | Facility: CLINIC | Age: 64
End: 2024-02-05
Payer: COMMERCIAL

## 2024-02-05 DIAGNOSIS — M54.50 CHRONIC RIGHT-SIDED LOW BACK PAIN WITHOUT SCIATICA: ICD-10-CM

## 2024-02-05 DIAGNOSIS — M54.50 LOW BACK PAIN: Primary | ICD-10-CM

## 2024-02-05 DIAGNOSIS — G89.29 CHRONIC RIGHT-SIDED LOW BACK PAIN WITHOUT SCIATICA: ICD-10-CM

## 2024-02-05 PROCEDURE — 97110 THERAPEUTIC EXERCISES: CPT | Mod: GP

## 2024-02-05 PROCEDURE — 11042 DBRDMT SUBQ TIS 1ST 20SQCM/<: CPT | Mod: ZK

## 2024-02-05 PROCEDURE — 11042 DBRDMT SUBQ TIS 1ST 20SQCM/<: CPT | Performed by: CLINICAL NURSE SPECIALIST

## 2024-02-05 PROCEDURE — 11045 DBRDMT SUBQ TISS EACH ADDL: CPT | Performed by: CLINICAL NURSE SPECIALIST

## 2024-02-05 PROCEDURE — 11045 DBRDMT SUBQ TISS EACH ADDL: CPT | Mod: ZK

## 2024-02-05 ASSESSMENT — PAIN SCALES - GENERAL: PAINLEVEL_OUTOF10: 4

## 2024-02-05 ASSESSMENT — PAIN - FUNCTIONAL ASSESSMENT: PAIN_FUNCTIONAL_ASSESSMENT: 0-10

## 2024-02-05 NOTE — PROGRESS NOTES
"Physical Therapy    Physical Therapy Treatment (reassessment)     Patient Name: Hugo Gauthier  MRN: [id number]  Today's Date: 2/5/2024  Time Calculation  Start Time: 0945  Stop Time: 1015  Time Calculation (min): 30 min  Visit #3    Assessment:    The patient trunk strength has improved since beginning therapy.  Added HS stretching to HEP.      Plan:   -Reassess in 1 month for possible discharge    Current Problem:   1. Low back pain  Follow Up In Physical Therapy    Follow Up In Physical Therapy      2. Chronic right-sided low back pain without sciatica            Subjective    General:   The patient reports he is returning to work tomorrow on light duty.      Precautions:  Precautions  Precautions Comment: none     Pain:  Pain Assessment  Pain Assessment: 0-10  Pain Score: 4  Pain Location: Back  Pain Orientation: Lower        Objective   Trunk AROM (degrees)  Flexion = 52  Extension = 6  R SB = 8  L SB = 15    Trunk strength = 3/5    Other Measures  Oswestry Disablity Index (DOMINIQUE): 13    Treatments:  EXERCISES Date 1/8/24 Date 2/5/24 Date Date    REPS REPS REPS REPS          Nustep              Shuttle   DLP       Shuttle   SLP              Shuttle   TR/HR        Reassessment 10' Reassessment 25'            Tband   Lat Pulls Blue x 20      Tband   Skis Blue x 20      Tband   Mid Row Blue x 20      Tband   Mower starts              4 Way Hip Flex 30# 2x10      4 Way Hip Abduction 30# 2x10             Prone       Prone Prop       Prone Press up              PPT       PPT with hip add/abd       PPT with marches              Seated Physioball 3 way stretch X5 ea 5\"H      Seated hamstring stretch  X3 ea 30\"H            Standing calf stretch at steps X3 ea 30\"H                    Goals: (By week 12)  1) Decrease LBP with daily activities <2/10 -partially met     2) Increase trunk strength to >Fair for improving posture with daily activities -partially met       "

## 2024-02-12 ENCOUNTER — TELEPHONE (OUTPATIENT)
Dept: PRIMARY CARE | Facility: CLINIC | Age: 64
End: 2024-02-12
Payer: COMMERCIAL

## 2024-02-12 DIAGNOSIS — B35.6 TINEA CRURIS: Primary | ICD-10-CM

## 2024-02-12 NOTE — TELEPHONE ENCOUNTER
Ne called cause you put him on the Nystatin and thinks he has a yeast infection on the inside and he can't go to the bathroom for number 2 pleaseadivsmaria l .  
sudden onset

## 2024-02-15 RX ORDER — KETOCONAZOLE 200 MG/1
200 TABLET ORAL DAILY
Qty: 10 TABLET | Refills: 0 | Status: SHIPPED | OUTPATIENT
Start: 2024-02-15 | End: 2024-02-26 | Stop reason: SDUPTHER

## 2024-02-22 DIAGNOSIS — I10 PRIMARY HYPERTENSION: Primary | ICD-10-CM

## 2024-02-22 RX ORDER — LISINOPRIL 40 MG/1
40 TABLET ORAL DAILY
Qty: 90 TABLET | Refills: 1 | Status: SHIPPED | OUTPATIENT
Start: 2024-02-22 | End: 2024-04-19 | Stop reason: SDUPTHER

## 2024-02-26 DIAGNOSIS — B35.6 TINEA CRURIS: ICD-10-CM

## 2024-02-26 RX ORDER — KETOCONAZOLE 200 MG/1
200 TABLET ORAL DAILY
Qty: 14 TABLET | Refills: 0 | Status: SHIPPED | OUTPATIENT
Start: 2024-02-26 | End: 2024-03-11

## 2024-03-04 ENCOUNTER — OFFICE VISIT (OUTPATIENT)
Dept: WOUND CARE | Facility: CLINIC | Age: 64
End: 2024-03-04
Payer: COMMERCIAL

## 2024-03-04 ENCOUNTER — TREATMENT (OUTPATIENT)
Dept: PHYSICAL THERAPY | Facility: CLINIC | Age: 64
End: 2024-03-04
Payer: COMMERCIAL

## 2024-03-04 ENCOUNTER — HOSPITAL ENCOUNTER (OUTPATIENT)
Dept: VASCULAR MEDICINE | Facility: HOSPITAL | Age: 64
Discharge: HOME | End: 2024-03-04
Payer: COMMERCIAL

## 2024-03-04 DIAGNOSIS — M54.50 LOW BACK PAIN: Primary | ICD-10-CM

## 2024-03-04 DIAGNOSIS — G89.29 CHRONIC RIGHT-SIDED LOW BACK PAIN WITHOUT SCIATICA: ICD-10-CM

## 2024-03-04 DIAGNOSIS — I87.1 ILIAC VEIN STENOSIS, LEFT: ICD-10-CM

## 2024-03-04 DIAGNOSIS — M54.50 CHRONIC RIGHT-SIDED LOW BACK PAIN WITHOUT SCIATICA: ICD-10-CM

## 2024-03-04 PROCEDURE — 93978 VASCULAR STUDY: CPT | Performed by: INTERNAL MEDICINE

## 2024-03-04 PROCEDURE — 11045 DBRDMT SUBQ TISS EACH ADDL: CPT

## 2024-03-04 PROCEDURE — 93978 VASCULAR STUDY: CPT

## 2024-03-04 PROCEDURE — 11042 DBRDMT SUBQ TIS 1ST 20SQCM/<: CPT

## 2024-03-04 PROCEDURE — 97110 THERAPEUTIC EXERCISES: CPT | Mod: GP

## 2024-03-04 ASSESSMENT — PAIN - FUNCTIONAL ASSESSMENT: PAIN_FUNCTIONAL_ASSESSMENT: 0-10

## 2024-03-04 ASSESSMENT — PAIN SCALES - GENERAL: PAINLEVEL_OUTOF10: 2

## 2024-03-04 NOTE — PROGRESS NOTES
"Physical Therapy    Physical Therapy Treatment (reassessment)     Patient Name: Hugo Gauthier  MRN: [id number]  Today's Date: 3/4/2024  Time Calculation  Start Time: 1400  Stop Time: 1425  Time Calculation (min): 25 min  Visit #4    Assessment:    The patient has achieved max therapy benefits & will discharge from PT.    Plan:   Discharge from therapy today.  Thank you for your referral.    Current Problem:   1. Low back pain  Follow Up In Physical Therapy      2. Chronic right-sided low back pain without sciatica            Subjective    General:   The patient reports he is trying to get his light duty extended for another 6 months.      Precautions:  Precautions  Precautions Comment: none     Pain:  Pain Assessment  Pain Assessment: 0-10  Pain Score: 2  Pain Location: Back  Pain Orientation: Lower        Objective   Trunk AROM (degrees)  Flexion = 55  Extension = 8  R SB = 15  L SB = 15    Other Measures  Oswestry Disablity Index (DOMINIQUE): 23    Treatments:  EXERCISES Date 1/8/24 Date 2/5/24 Date 3/4/24 Date    REPS REPS REPS REPS      Discharge 25'    Nustep              Shuttle   DLP       Shuttle   SLP              Shuttle   TR/HR        Reassessment 10' Reassessment 25'            Tband   Lat Pulls Blue x 20      Tband   Skis Blue x 20      Tband   Mid Row Blue x 20      Tband   Mower starts              4 Way Hip Flex 30# 2x10      4 Way Hip Abduction 30# 2x10             Prone       Prone Prop       Prone Press up              PPT       PPT with hip add/abd       PPT with marches              Seated Physioball 3 way stretch X5 ea 5\"H      Seated hamstring stretch  X3 ea 30\"H            Standing calf stretch at steps X3 ea 30\"H                  Goals: (By week 12)  1) Decrease LBP with daily activities <2/10 -partially met     2) Increase trunk strength to >Fair for improving posture with daily activities -partially met     "

## 2024-03-06 ENCOUNTER — TELEPHONE (OUTPATIENT)
Dept: VASCULAR SURGERY | Facility: CLINIC | Age: 64
End: 2024-03-06
Payer: COMMERCIAL

## 2024-03-06 DIAGNOSIS — E29.1 HYPOGONADISM MALE: ICD-10-CM

## 2024-03-06 RX ORDER — TESTOSTERONE CYPIONATE 200 MG/ML
200 INJECTION, SOLUTION INTRAMUSCULAR
Qty: 2 ML | Refills: 5 | Status: SHIPPED | OUTPATIENT
Start: 2024-03-06 | End: 2024-09-02

## 2024-03-06 RX ORDER — SYRINGE WITH NEEDLE, 1 ML 25GX5/8"
1 SYRINGE, EMPTY DISPOSABLE MISCELLANEOUS
Qty: 2 EACH | Refills: 0 | Status: SHIPPED | OUTPATIENT
Start: 2024-03-06

## 2024-03-06 NOTE — TELEPHONE ENCOUNTER
Result Communication    Resulted Orders   Vascular US IVC iliac duplex complete    [place] [id number]       Phone [phone] Fax [phone]       Vascular Lab Report     VASC US IVC ILIAC DUPLEX COMPLETE    Patient Name:      POLINA MCCORMACK        Regis Physician:  [id number] Moseley MD  Study Date:        3/4/2024             Ordering Provider:  [id number] AVI QUINTANA  MRN/PID:           [id number]             Fellow:  Accession#:        [id number]         Technologist:       Peggy Hall                                                              T  Date of Birth/Age: 1960 / 63 years Technologist 2:  Gender:            M                    Encounter#:         [id number]  Admission Status:  Outpatient           Location Performed: [facility]       Diagnosis/ICD: Compression of vein-I87.1  CPT Codes:     11761 Duplex Aorta/IVC/Iliac/Bypass Graft       CONCLUSIONS:  Aorta/Common Iliac Arteries/IVC: The inferior vena cava appears patent with no evidence of thrombosis. The left common iliac vein stent appears patent. Technically difficult study due to body habitus and heavy overlying bowel gas.     Imaging & Doppler Findings:     [id number] Moseley MD  Electronically signed by [id number] Moseley MD on 3/5/2024 at 11:26:12 AM         ** Final **         9:58 AM      Results were not successfully communicated with the patient and they did not acknowledge their understanding.    Pt has an upcoming appt to discuss results- overall testing looks good. Stent is open

## 2024-03-11 ENCOUNTER — APPOINTMENT (OUTPATIENT)
Dept: VASCULAR SURGERY | Facility: CLINIC | Age: 64
End: 2024-03-11
Payer: COMMERCIAL

## 2024-03-18 ENCOUNTER — OFFICE VISIT (OUTPATIENT)
Dept: VASCULAR SURGERY | Facility: CLINIC | Age: 64
End: 2024-03-18
Payer: COMMERCIAL

## 2024-03-18 ENCOUNTER — OFFICE VISIT (OUTPATIENT)
Dept: WOUND CARE | Facility: CLINIC | Age: 64
End: 2024-03-18
Payer: COMMERCIAL

## 2024-03-18 VITALS
SYSTOLIC BLOOD PRESSURE: 127 MMHG | DIASTOLIC BLOOD PRESSURE: 70 MMHG | BODY MASS INDEX: 50.94 KG/M2 | HEART RATE: 86 BPM | WEIGHT: 315 LBS

## 2024-03-18 DIAGNOSIS — I87.1 ILIAC VEIN STENOSIS, LEFT: Primary | ICD-10-CM

## 2024-03-18 PROCEDURE — 1036F TOBACCO NON-USER: CPT | Performed by: INTERNAL MEDICINE

## 2024-03-18 PROCEDURE — 99213 OFFICE O/P EST LOW 20 MIN: CPT | Performed by: INTERNAL MEDICINE

## 2024-03-18 PROCEDURE — 3078F DIAST BP <80 MM HG: CPT | Performed by: INTERNAL MEDICINE

## 2024-03-18 PROCEDURE — 3074F SYST BP LT 130 MM HG: CPT | Performed by: INTERNAL MEDICINE

## 2024-03-18 PROCEDURE — 11045 DBRDMT SUBQ TISS EACH ADDL: CPT

## 2024-03-18 PROCEDURE — 11042 DBRDMT SUBQ TIS 1ST 20SQCM/<: CPT

## 2024-03-18 PROCEDURE — 3008F BODY MASS INDEX DOCD: CPT | Performed by: INTERNAL MEDICINE

## 2024-03-18 PROCEDURE — 4010F ACE/ARB THERAPY RXD/TAKEN: CPT | Performed by: INTERNAL MEDICINE

## 2024-03-18 ASSESSMENT — ENCOUNTER SYMPTOMS
CONSTITUTIONAL NEGATIVE: 1
HEMATOLOGIC/LYMPHATIC NEGATIVE: 1
EYES NEGATIVE: 1
ALLERGIC/IMMUNOLOGIC NEGATIVE: 1
MUSCULOSKELETAL NEGATIVE: 1
GASTROINTESTINAL NEGATIVE: 1
ENDOCRINE NEGATIVE: 1
WOUND: 1
NEUROLOGICAL NEGATIVE: 1
PSYCHIATRIC NEGATIVE: 1
RESPIRATORY NEGATIVE: 1

## 2024-03-18 NOTE — PROGRESS NOTES
Subjective   Patient ID: Hugo Gauthier is a 63 y.o. male who presents for Follow-up (results).  HPI  63 year old male with a past medical history of obesity, afib, cellulitis, back pain, chronic venous stasis dermatitis and HTN that presents to the office for a 3 mth follow up s/p a venogram with left iliac vein stent per Dr. Camejo on 11/20/23. Overall he is doing well. He states that his swelling in his left leg has improved. He is currently going to the wound center, on and off the past 2 years for chronic venous stasis. He has an open wound on his left lower leg- is going to the wound center every month. He states that his wound is improving. Recent IVC duplex shows a patent iliac vein stent.       Vascular testing:  IVC duplex 3/24: The inferior vena cava appears patent with no evidence of thrombosis. The left common iliac vein stent appears patent.     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   PVR 5/15/23: No evidence of arterial occlusive disease in the right lower extremity at rest. Left pressures of >220 mmHg suggest no compressibility of vessels and may make absolute Segmental Limb Pressures (SLP) unreliable     Vascular procedures:  11/20/23: Venogram s/p left iliac vein stent per Dr. Camejo    Review of Systems   Constitutional: Negative.    HENT: Negative.     Eyes: Negative.    Respiratory: Negative.     Cardiovascular:  Positive for leg swelling.   Gastrointestinal: Negative.    Endocrine: Negative.    Genitourinary: Negative.    Musculoskeletal: Negative.    Skin:  Positive for wound.   Allergic/Immunologic: Negative.    Neurological: Negative.    Hematological: Negative.    Psychiatric/Behavioral: Negative.         Objective   Physical Exam  Constitutional:       Appearance: He is obese.   Eyes:      Pupils: Pupils are equal, round, and reactive to light.   Cardiovascular:      Rate and  Rhythm: Normal rate.   Pulmonary:      Effort: Pulmonary effort is normal.   Abdominal:      General: Bowel sounds are normal.   Musculoskeletal:         General: Swelling present.      Cervical back: Normal range of motion.   Skin:     General: Skin is warm and dry.      Capillary Refill: Capillary refill takes less than 2 seconds.   Neurological:      General: No focal deficit present.      Mental Status: He is alert.   Psychiatric:         Mood and Affect: Mood normal.         Assessment/Plan   63 year old male with a past medical history of obesity, afib, cellulitis, back pain, chronic venous stasis dermatitis and HTN that presents to the office for a 3 mth follow up s/p a venogram with left iliac vein stent per Dr. Camejo on 11/20/23.     Plan:  Recent IVC duplex shows a patent iliac stent  Continue care at the wound clinic  Call office if you develop worsening leg pain, swelling or develop a wound/ulcer  Follow up in a year            HSERLEY Rivera 03/18/24 9:24 AM

## 2024-03-29 ENCOUNTER — TELEPHONE (OUTPATIENT)
Dept: PRIMARY CARE | Facility: CLINIC | Age: 64
End: 2024-03-29
Payer: COMMERCIAL

## 2024-03-29 DIAGNOSIS — R11.2 NAUSEA AND VOMITING, UNSPECIFIED VOMITING TYPE: Primary | ICD-10-CM

## 2024-03-29 RX ORDER — ONDANSETRON 4 MG/1
4 TABLET, ORALLY DISINTEGRATING ORAL EVERY 8 HOURS PRN
Qty: 10 TABLET | Refills: 0 | Status: SHIPPED | OUTPATIENT
Start: 2024-03-29 | End: 2024-04-05

## 2024-03-29 NOTE — TELEPHONE ENCOUNTER
Chief complaint is flu, was there a positive test? If so I can send in some tamiflu, if not we can send some zofran to try to improve PO intake, work on staying hydrated. If still unable to tolerate PO recommend ED.

## 2024-03-29 NOTE — TELEPHONE ENCOUNTER
Patient called in urgent care while on phone with this nurse Patient to call if anything else is needed

## 2024-03-29 NOTE — TELEPHONE ENCOUNTER
Chief Complaint: Flu    Symptoms: diarrhea , vomiting, nausea, chills, very fatigued    Duration: 8 days -vomiting & diarrhea stopped yesterday,he is not eating much    Treatments: tylenol , drinking some coke    Patient Requesting: your recommendation    Did the patient have their Covid Vaccine?---Covid test negative    Pharmacy: [facility]      Licha (wife) CELL # [phone]

## 2024-04-01 ENCOUNTER — APPOINTMENT (OUTPATIENT)
Dept: WOUND CARE | Facility: CLINIC | Age: 64
End: 2024-04-01
Payer: COMMERCIAL

## 2024-04-15 ENCOUNTER — OFFICE VISIT (OUTPATIENT)
Dept: WOUND CARE | Facility: CLINIC | Age: 64
End: 2024-04-15
Payer: COMMERCIAL

## 2024-04-15 PROCEDURE — 11045 DBRDMT SUBQ TISS EACH ADDL: CPT

## 2024-04-15 PROCEDURE — 87070 CULTURE OTHR SPECIMN AEROBIC: CPT | Mod: OUT | Performed by: PODIATRIST

## 2024-04-15 PROCEDURE — 11042 DBRDMT SUBQ TIS 1ST 20SQCM/<: CPT

## 2024-04-16 ENCOUNTER — LAB REQUISITION (OUTPATIENT)
Dept: LAB | Facility: HOSPITAL | Age: 64
End: 2024-04-16
Payer: COMMERCIAL

## 2024-04-16 DIAGNOSIS — L97.822 NON-PRESSURE CHRONIC ULCER OF OTHER PART OF LEFT LOWER LEG WITH FAT LAYER EXPOSED (MULTI): ICD-10-CM

## 2024-04-19 ENCOUNTER — TELEPHONE (OUTPATIENT)
Dept: PRIMARY CARE | Facility: CLINIC | Age: 64
End: 2024-04-19
Payer: COMMERCIAL

## 2024-04-19 DIAGNOSIS — I10 PRIMARY HYPERTENSION: ICD-10-CM

## 2024-04-19 LAB
BACTERIA SPEC CULT: ABNORMAL
GRAM STN SPEC: ABNORMAL

## 2024-04-19 RX ORDER — LISINOPRIL 40 MG/1
20 TABLET ORAL DAILY
Qty: 90 TABLET | Refills: 1 | Status: SHIPPED | OUTPATIENT
Start: 2024-04-19

## 2024-04-22 NOTE — TELEPHONE ENCOUNTER
Patient is thinking that his BP medication may be need to be lowered, it was 127/62 on Monday at [facility], and he has been getting dizzy when he stands up.   he is asking if hi medication needs to be lowered or adjusted. Please advise.  
Pt notified    To clarify, you are on Diltiazem, lasix or furosemide, spironolactone, and Lisinopril correct? Why don't your try cutting the lisinopril 40mg in half and keep me posted (so 20mg)    
Mildly to Moderately Impaired: difficulty hearing in some environments or speaker may need to increase volume or speak distinctly

## 2024-05-06 ENCOUNTER — OFFICE VISIT (OUTPATIENT)
Dept: WOUND CARE | Facility: CLINIC | Age: 64
End: 2024-05-06
Payer: COMMERCIAL

## 2024-05-06 PROCEDURE — 11042 DBRDMT SUBQ TIS 1ST 20SQCM/<: CPT

## 2024-05-06 PROCEDURE — 11045 DBRDMT SUBQ TISS EACH ADDL: CPT

## 2024-05-18 DIAGNOSIS — R07.89 OTHER CHEST PAIN: ICD-10-CM

## 2024-05-18 DIAGNOSIS — R06.02 SOBOE (SHORTNESS OF BREATH ON EXERTION): ICD-10-CM

## 2024-05-18 DIAGNOSIS — R40.0 DAYTIME SOMNOLENCE: ICD-10-CM

## 2024-05-18 DIAGNOSIS — I25.10 CORONARY ARTERY DISEASE INVOLVING NATIVE CORONARY ARTERY OF NATIVE HEART WITHOUT ANGINA PECTORIS: ICD-10-CM

## 2024-05-18 DIAGNOSIS — I50.32 CHRONIC HEART FAILURE WITH PRESERVED EJECTION FRACTION (MULTI): ICD-10-CM

## 2024-05-18 DIAGNOSIS — I10 PRIMARY HYPERTENSION: ICD-10-CM

## 2024-05-18 DIAGNOSIS — R53.82 CHRONIC FATIGUE: ICD-10-CM

## 2024-05-28 RX ORDER — FUROSEMIDE 40 MG/1
40 TABLET ORAL DAILY
Qty: 90 TABLET | Refills: 1 | Status: SHIPPED | OUTPATIENT
Start: 2024-05-28 | End: 2025-05-28

## 2024-06-03 ENCOUNTER — CLINICAL SUPPORT (OUTPATIENT)
Dept: WOUND CARE | Facility: CLINIC | Age: 64
End: 2024-06-03
Payer: COMMERCIAL

## 2024-06-03 PROCEDURE — 99213 OFFICE O/P EST LOW 20 MIN: CPT

## 2024-06-17 ENCOUNTER — TELEPHONE (OUTPATIENT)
Dept: PRIMARY CARE | Facility: CLINIC | Age: 64
End: 2024-06-17
Payer: COMMERCIAL

## 2024-06-17 DIAGNOSIS — I10 PRIMARY HYPERTENSION: Primary | ICD-10-CM

## 2024-06-17 NOTE — TELEPHONE ENCOUNTER
Needs refill Lisinopril 20mg daily to [facility]  He was previously taking a 40mg tablet and cutting it in half, but would like to do 20mg pills instead because cutting them in half is not easy.

## 2024-06-19 RX ORDER — LISINOPRIL 20 MG/1
20 TABLET ORAL DAILY
Qty: 90 TABLET | Refills: 3 | Status: SHIPPED | OUTPATIENT
Start: 2024-06-19 | End: 2025-06-19

## 2024-07-01 ENCOUNTER — OFFICE VISIT (OUTPATIENT)
Dept: WOUND CARE | Facility: CLINIC | Age: 64
End: 2024-07-01
Payer: COMMERCIAL

## 2024-07-01 PROCEDURE — 11045 DBRDMT SUBQ TISS EACH ADDL: CPT

## 2024-07-01 PROCEDURE — 11042 DBRDMT SUBQ TIS 1ST 20SQCM/<: CPT

## 2024-07-08 RX ORDER — COLLAGENASE SANTYL 250 [ARB'U]/G
OINTMENT TOPICAL
COMMUNITY
Start: 2024-06-21

## 2024-07-09 NOTE — PROGRESS NOTES
[facility]    Patient Name: Hugo Gauthier  Patient : 1960      Scribe Attestation  By signing my name below, IMaine Scribe   attest that this documentation has been prepared under the direction and in the presence of Gorge Medeiros DO.      Reason for visit:  This is a 63-year-old male here for follow-up regarding HFpEF, minimal coronary artery disease as seen on cardiac catheterization done in 2023, sleep apnea/chronic fatigue, hypertension, chronic venous stasis dermatitis, and morbid obesity.      HPI:  This is a 63-year-old male here for follow-up regarding HFpEF, minimal coronary artery disease as seen on cardiac catheterization done in 2023, sleep apnea/chronic fatigue, hypertension, chronic venous stasis dermatitis, and morbid obesity.  The patient was last evaluated by me in 2024.  At that visit I started diltiazem 120 mg daily and spironolactone 25 mg daily, ordered blood work including CMP/lipid/magnesium/BNP to be drawn in 6 months, and asked the patient to follow-up in 6 months and sooner if necessary.  Patient was subsequently seen by Vascular Surgery in 2024 at which time he was doing reasonably well, asked to follow-up in the wound clinic, and asked to follow-up in 1 year. ECG done today showed sinus rhythm with a heart rate of 66 bpm.  The patient reports that he has been feeling generally well from the cardiac standpoint. He denies any new chest pain, shortness of breath, and palpitations. He states that he had been seeing some weight fluctuations at home. He reports having episodes of lightheadedness which improved after the dose of lisinopril was reduced by his PCP.  He states that he does not monitor his blood pressure at home. He states that he takes all of his medications as prescribed. During my exam, he was resting comfortably on the exam table.            Assessment/Plan:   1. HFpEF  The patient has  a history of HFpEF.  Echocardiogram done in September 2023 showed normal left ventricular systolic function with an ejection fraction of 60%, grade 1 diastolic dysfunction, and difficult imaging with poorly visualized anatomic structures.    He does not appear significantly volume overloaded on exam today except for trace to 1+ pitting bilateral lower extremity edema.  He should continue his current cardiac medications.  Lab works will be done today and again in 6 months prior to the next visit.   I discussed with him the importance of following a low-sodium heart healthy diet as well as weight loss.   Follow up in 6 months and sooner if necessary.      2. Coronary artery disease  The patient had a history of minimal nonobstructive coronary artery disease as seen on cardiac catheterization done in October 2023.  ECG done today showed sinus rhythm with a heart rate of 66 bpm.    He denies anginal chest discomfort or shortness of breath on exertion.  Blood pressure appears suboptimally controlled on exam today.  He should continue his current antihypertensive medications and antiplatelet therapy.  Echocardiogram done in September 2023 showed normal left ventricular systolic function with an ejection fraction of 60%, grade 1 diastolic dysfunction, and difficult imaging with poorly visualized anatomic structures.    Lab works will be done today and again in 6 months prior to the next visit.   Please see lifestyle recommendations below.  Follow up in 6 months and sooner if necessary.      3. Sleep apnea/Chronic fatigue  The patient has a history of obstructive sleep apnea. The previously reported chronic fatigue had remained unchanged since the last visit.   He should continue to follow with Neurology.     4. Hypertension  The patient has a history of hypertension which appears suboptimally controlled on exam today  He should continue his current antihypertensive medications and monitor his blood pressure at home.       5. Chronic venous stasis dermatitis  The patient has a history of chronic venous stasis dermatitis followed by Vascular Surgery.   He underwent a bilateral lower venogram with left iliac vein stent on 11/20/2023.   Warfarin dosing and INR monitoring is being managed by his PCP.     6. Morbid obesity  Please see lifestyle recommendations below.        Orders:   CMP/lipid/magnesium/BNP   BMP/BNP/magnesium in 6 months,   Follow-up in 6 months.    Lifestyle Recommendations  I recommend a whole-food plant-based diet, an eating pattern that encourages the consumption of unrefined plant foods (such as fruits, vegetables, tubers, whole grains, legumes, nuts and seeds) and discourages meats, dairy products, eggs and processed foods.     The AHA/ACC recommends that the patient consume a dietary pattern that emphasizes intake of vegetables, fruits, and whole grains; includes low-fat dairy products, poultry, fish, legumes, non-tropical vegetable oils, and nuts; and limits intake of sodium, sweets, sugar-sweetened beverages, and red meats.  Adapt this dietary pattern to appropriate calorie requirements (a 500-750 kcal/day deficit to loose weight), personal and cultural food preferences, and nutrition therapy for other medical conditions (including diabetes).  Achieve this pattern by following plans such as the [place] Mediterranean, DASH dietary pattern, or AHA diet.     Engage in 2 hours and 30 minutes per week of moderate-intensity physical activity, or 1 hour and 15 minutes (75 minutes) per week of vigorous-intensity aerobic physical activity, or an equivalent combination of moderate and vigorous-intensity aerobic physical activity. Aerobic activity should be performed in episodes of at least 10 minutes preferably spread throughout the week.     Adhering to a heart healthy diet, regular exercise habits, avoidance of tobacco products, and maintenance of a healthy weight are crucial components of their heart disease risk  reduction.     Any positive review of systems not specifically addressed in the office visit today should be evaluated and treated by the patients primary care physician or in an emergency department if necessary     Patient was notified that results from ordered tests will be called to the patient if it changes current management; it will otherwise be discussed at a future appointment and available on [facility].     Thank you for allowing me to participate in the care of this patient.        This document was generated using the assistance of voice recognition software. If there are any errors of spelling, grammar, syntax, or meaning; please feel free to contact me directly for clarification.    Past Medical History:  He has a past medical history of Arrhythmia, Clotting disorder (Multi), Coronary artery disease involving native coronary artery of native heart (10/20/2023), Disease of thyroid gland, Hypertension, Personal history of diseases of the blood and blood-forming organs and certain disorders involving the immune mechanism (10/19/2021), and Personal history of diseases of the blood and blood-forming organs and certain disorders involving the immune mechanism (10/19/2021).    He has no past medical history of AAA (abdominal aortic aneurysm) (CMS-HCC), Asthma ([facility]), Cancer (Multi), Carotid artery occlusion, CHF (congestive heart failure) (Multi), Chronic kidney disease, Heart murmur, Hyperlipidemia, Stroke (Multi), or Syncope.    Past Surgical History:  He has a past surgical history that includes Cardiac catheterization (N/A, 10/13/2023).      Social History:  He reports that he has quit smoking. His smoking use included cigarettes. He has a 20 pack-year smoking history. He has never been exposed to tobacco smoke. He has never used smokeless tobacco. He reports that he does not currently use alcohol. He reports that he does not currently use drugs.    Family History:  No family history on file.    "  Allergies:  Patient has no known allergies.    Outpatient Medications:  Current Outpatient Medications   Medication Instructions    alpha tocopherol (Vitamin E) 268 mg (400 unit) capsule oral    ascorbic acid (Vitamin C) 1,000 mg tablet 1 tablet, oral, Daily    BD Luer-Juliette Syringe 3 mL 25 gauge x 1\" syringe 1 mL, miscellaneous, Every 14 days, Using syringe. 2 per month.    cholecalciferol (Vitamin D-3) 50 MCG (2000 UT) tablet 1 tablet, oral, Daily    dilTIAZem CD (CARDIZEM CD) 120 mg, oral, Daily    folic acid (Folvite) 800 mcg tablet 1 tablet, oral, Daily    furosemide (LASIX) 40 mg, oral, Daily    iodine, bulk, crystals USE AS DIRECTED.    ketoconazole (NIZOral) 2 % cream Topical, 2 times daily    lisinopril 20 mg, oral, Daily    magnesium oxide (Mag-Ox) 400 mg tablet 1 tablet, oral, Daily    SantyL 250 unit/gram ointment     selenium 200 mcg tablet 1 tablet, oral, Daily    sildenafil (VIAGRA) 100 mg, oral, Daily PRN    silver sulfADIAZINE (Silvadene) 1 % cream APPLY AND RUB IN A THIN FILM TO AFFECTED AREAS TWICE DAILY.(AM AND PM).    spironolactone (ALDACTONE) 25 mg, oral, Daily    testosterone cypionate (DEPO-TESTOSTERONE) 200 mg, intramuscular, Every 14 days    tiZANidine (ZANAFLEX) 2 mg, oral, Nightly    warfarin (Coumadin) 4 mg tablet 4 mg orally daily    warfarin (COUMADIN) 1 mg, oral    zinc gluconate 100 mg, oral, Daily        ROS:  A 14 point review of systems was done and is negative other than as stated in HPI    Vitals:      12/4/2023    10:09 AM 12/18/2023    11:25 PM 1/22/2024    10:27 AM 1/26/2024     9:42 PM 1/30/2024     9:02 AM 2/2/2024     1:02 PM 3/18/2024     8:07 AM   Vitals   Systolic 120 120 132 132 134 159 127   Diastolic 76 76 82 82 74 85 70   Heart Rate 78  75  98 85 86   Resp     18     Height (in) 1.778 m (5' 10\") 1.78 m (5' 10.08\") 1.778 m (5' 10\") 1.78 m (5' 10.08\") 1.778 m (5' 10\")     Weight (lb) 370 370.37 359 359.35 360 355.6 355   BMI 53.09 kg/m2 53.02 kg/m2 51.51 kg/m2 51.45 " "kg/m2 51.65 kg/m2 51.02 kg/m2 50.94 kg/m2   BSA (m2) 2.88 m2 2.88 m2 2.84 m2 2.84 m2 2.84 m2 2.82 m2 2.82 m2        Physical Exam:     Constitutional: Cooperative, in no acute distress, alert, appears stated age.   Skin: Skin color, texture, turgor normal. No rashes or lesions.   Head: Normocephalic. No masses, lesions, tenderness or abnormalities   Eyes: Extraocular movements are grossly intact.   Mouth and throat: Mucous membranes moist   Neck: Neck supple, no carotid bruits, no JVD   Respiratory: Lungs clear to auscultation, no wheezing or rhonchi, no use of accessory muscles   Chest wall: No scars, normal excursion with respiration   Cardiovascular: Regular rhythm without murmur  Gastrointestinal: Abdomen soft, nontender. Bowel sounds normal. Morbidly obese.  Musculoskeletal: Strength equal in upper extremities   Extremities: Trace to 1+ pitting edema  Neurologic: Sensation grossly intact, alert and oriented ×3     Intake/Output:   No intake/output data recorded.    Outpatient Medications  Current Outpatient Medications on File Prior to Visit   Medication Sig Dispense Refill    alpha tocopherol (Vitamin E) 268 mg (400 unit) capsule Take by mouth.      ascorbic acid (Vitamin C) 1,000 mg tablet Take 1 tablet (1,000 mg) by mouth once daily.      BD Luer-Juliette Syringe 3 mL 25 gauge x 1\" syringe 1 mL every 14 (fourteen) days. Using syringe. 2 per month. 2 each 0    cholecalciferol (Vitamin D-3) 50 MCG (2000 UT) tablet Take 1 tablet (2,000 Units) by mouth once daily.      dilTIAZem CD (Cardizem CD) 120 mg 24 hr capsule Take 1 capsule (120 mg) by mouth once daily. 90 capsule 1    folic acid (Folvite) 800 mcg tablet Take 1 tablet (800 mcg) by mouth once daily.      furosemide (Lasix) 40 mg tablet Take 1 tablet (40 mg) by mouth once daily. 90 tablet 1    iodine, bulk, crystals USE AS DIRECTED.      ketoconazole (NIZOral) 2 % cream APPLY SPARINGLY TO AFFECTED AREA(S) TWICE DAILY 60 g 0    lisinopril 20 mg tablet Take 1 " tablet (20 mg) by mouth once daily. 90 tablet 3    magnesium oxide (Mag-Ox) 400 mg tablet Take 1 tablet (400 mg) by mouth once daily.      SantyL 250 unit/gram ointment       selenium 200 mcg tablet Take 1 tablet (200,000 mcg) by mouth once daily.      sildenafil (Viagra) 100 mg tablet Take 1 tablet (100 mg) by mouth once daily as needed for erectile dysfunction. 30 tablet 0    silver sulfADIAZINE (Silvadene) 1 % cream APPLY AND RUB IN A THIN FILM TO AFFECTED AREAS TWICE DAILY.(AM AND PM). 85 g 0    spironolactone (Aldactone) 25 mg tablet Take 1 tablet (25 mg) by mouth once daily. 180 tablet 1    testosterone cypionate (Depo-Testosterone) 200 mg/mL injection Inject 1 mL (200 mg) into the muscle every 14 (fourteen) days. 2 mL 5    tiZANidine (Zanaflex) 2 mg tablet TAKE ONE TABLET BY MOUTH AT BEDTIME 30 tablet 0    warfarin (Coumadin) 1 mg tablet TAKE 1 TABLET BY MOUTH EVERY DAY 30 tablet 0    warfarin (Coumadin) 4 mg tablet 4 mg orally daily (Patient taking differently: 4.5 mg once daily. 4 mg orally daily) 30 tablet 11    zinc gluconate 100 mg tablet Take 1 tablet (100 mg) by mouth once daily.  0    [DISCONTINUED] lisinopril 40 mg tablet Take 0.5 tablets (20 mg) by mouth once daily. 90 tablet 1     No current facility-administered medications on file prior to visit.       Labs: (past 26 weeks)  Recent Results (from the past 4368 hour(s))   Protime-INR    Collection Time: 01/26/24  8:53 AM   Result Value Ref Range    Protime 29.6 (H) 9.8 - 12.8 seconds    INR 2.6 (H) 0.9 - 1.1   Tissue/Wound Culture/Smear    Collection Time: 04/15/24  1:40 PM    Specimen: Wound/Tissue; Tissue/Biopsy   Result Value Ref Range    Tissue/Wound Culture/Smear       (4+) Abundant Mixed Gram-Positive and Gram-Negative Bacteria    Gram Stain No polymorphonuclear leukocytes seen (A)     Gram Stain (2+) Few Gram negative bacilli (A)     Gram Stain (1+) Rare Gram positive pleomorphic bacilli (A)        ECG  Encounter Date: 01/22/24   ECG 12 Lead     Narrative    Sinus rhythm heart rate 75 bpm.       Echocardiogram  No results found for this or any previous visit from the past 1095 days.      CV Studies:  EKG:  Encounter Date: 01/22/24   ECG 12 Lead    Narrative    Sinus rhythm heart rate 75 bpm.     Echocardiogram:   Echocardiogram     [place] [id number]  Phone [phone] Fax [phone]    TRANSTHORACIC ECHOCARDIOGRAM REPORT      Patient Name:     POLINA MCCORMACK Reading Physician:   [id number] Latricia Moseley MD  Study Date:       9/25/2023     Referring Physician: FLOR VALLADARES  MRN/PID:          [id number]      PCP:  Accession/Order#: [id number]  Department Location: [facility]  YOB: 1960     Fellow:  Gender:           M             Nurse:               Malu Freeman RN  Admit Date:                     Sonographer:         Ronda Mae [facility] [place]  Admission Status: Outpatient    Additional Staff:  Height:           177.80 cm     CC Report to:  Weight:           169.19 kg     Study Type:          Echocardiogram  BSA:              2.72 m2  Blood Pressure: 170 /84 mmHg    Diagnosis/ICD: I10-Essential (primary) hypertension; R06.02-Shortness of breath  Indication:    Dyspnea on Exertion  Procedure/CPT: Echo Complete w Full Doppler-52297    Patient History:  Pertinent History: HTN.    Study Detail: The following Echo studies were performed: 2D, M-Mode, Doppler and  color flow. Technically challenging study due to body habitus and  prominent lung artifact. Optison used as a contrast agent for  endocardial border definition. Total contrast used for this  procedure was 3 mL via IV push.      PHYSICIAN INTERPRETATION:  Left Ventricle: Left ventricular systolic function is normal, with an estimated ejection fraction of 60-65%. There are no regional wall motion abnormalities. The left ventricular cavity size is normal. Spectral Doppler shows an impaired relaxation pattern of left  ventricular diastolic filling.  Left Atrium: The left atrium is normal in size.  Right Ventricle: The right ventricle is normal in size. There is normal right ventricular global systolic function.  Right Atrium: The right atrium is normal in size.  Aortic Valve: The aortic valve was not well visualized. There is no evidence of aortic valve regurgitation. The peak instantaneous gradient of the aortic valve is 12.7 mmHg. The mean gradient of the aortic valve is 6.9 mmHg.  Mitral Valve: The mitral valve is normal in structure. There is no evidence of mitral valve regurgitation.  Tricuspid Valve: The tricuspid valve is structurally normal. No evidence of tricuspid regurgitation.  Pulmonic Valve: The pulmonic valve is structurally normal. There is no indication of pulmonic valve regurgitation.  Pericardium: There is no pericardial effusion noted.  Aorta: The aortic root is normal.      CONCLUSIONS:  1. Left ventricular systolic function is normal with a 60-65% estimated ejection fraction.  2. Poorly visualized anatomical structures due to suboptimal image quality.  3. Spectral Doppler shows an impaired relaxation pattern of left ventricular diastolic filling.    QUANTITATIVE DATA SUMMARY:  2D MEASUREMENTS:  Normal Ranges:  LAs:           4.45 cm   (2.7-4.0cm)  IVSd:          0.75 cm   (0.6-1.1cm)  LVPWd:         0.82 cm   (0.6-1.1cm)  LVIDd:         5.83 cm   (3.9-5.9cm)  LVIDs:         4.67 cm  LV Mass Index: 63.6 g/m2  LV % FS        19.9 %    LA VOLUME:  Normal Ranges:  LA Vol A4C:        79.5 ml    (22+/-6mL/m2)  LA Vol A2C:        67.4 ml  LA Vol BP:         77.9 ml  LA Vol Index A4C:  29.2 ml/m2  LA Vol Index A2C:  24.8 ml/m2  LA Vol Index BP:   28.6 ml/m2  LA Area A4C:       25.4 cm2  LA Area A2C:       22.0 cm2  LA Major Axis A4C: 6.9 cm  LA Major Axis A2C: 6.1 cm  LA Volume Index:   28.6 ml/m2  LA Vol A4C:        74.6 ml  LA Vol A2C:        60.4 ml    RA VOLUME BY A/L METHOD:  Normal Ranges:  RA Area A4C: 14.9  cm2    AORTA MEASUREMENTS:  Normal Ranges:  Ao Sinus, d: 3.50 cm (2.1-3.5cm)  Ao STJ, d:   2.70 cm (1.7-3.4cm)  Asc Ao, d:   3.50 cm (2.1-3.4cm)    LV SYSTOLIC FUNCTION BY 2D PLANIMETRY (MOD):  Normal Ranges:  EF-A4C View: 64.4 % (>=55%)  EF-A2C View: 57.6 %  EF-Biplane:  59.7 %    LV DIASTOLIC FUNCTION:  Normal Ranges:  MV Peak E:    0.82 m/s    (0.7-1.2 m/s)  MV Peak A:    1.13 m/s    (0.42-0.7 m/s)  E/A Ratio:    0.73        (1.0-2.2)  MV e'         0.10 m/s    (>8.0)  MV lateral e' 0.11 m/s  MV medial e'  0.10 m/s  MV A Dur:     131.30 msec  E/e' Ratio:   7.85        (<8.0)    MITRAL VALVE:  Normal Ranges:  MV DT: 205 msec (150-240msec)    AORTIC VALVE:  Normal Ranges:  AoV Vmax:                1.78 m/s  (<=1.7m/s)  AoV Peak P.7 mmHg (<20mmHg)  AoV Mean P.9 mmHg  (1.7-11.5mmHg)  LVOT Max Franck:            1.02 m/s  (<=1.1m/s)  AoV VTI:                 35.30 cm  (18-25cm)  LVOT VTI:                21.21 cm  LVOT Diameter:           2.22 cm   (1.8-2.4cm)  AoV Area, VTI:           2.32 cm2  (2.5-5.5cm2)  AoV Area,Vmax:           2.21 cm2  (2.5-4.5cm2)  AoV Dimensionless Index: 0.60      RIGHT VENTRICLE:  RV Basal 3.00 cm  RV Mid   2.40 cm  RV Major 8.4 cm  TAPSE:   22.2 mm  RV s'    0.13 m/s    AORTA:  Asc Ao Diam 3.54 cm      [id number] Latricia Moseley MD  Electronically signed on 2023 at 11:37:05 AM         Final     Stress Testing IMESULT(GVM6133:1:1825): No results found for this or any previous visit from the past 1825 days.    Cardiac Catheterization: No results found for this or any previous visit from the past 1825 days.  No results found for this or any previous visit from the past 3650 days.     Cardiac Scoring:   CT heart calcium scoring wo IV contrast 2022    Narrative  MRN: [id number]  Patient Name: EASTONKANDICEPOLINA    STUDY:  CT CARDIAC SCORING;  2022 10:25 am    INDICATION:  cardiac screening  Z13.6: Screening for cardiovascular  "condition.    COMPARISON:  None.    ACCESSION NUMBER(S):  [id number]    ORDERING CLINICIAN:  ALYSON RAMIREZ    TECHNIQUE:  Using prospective ECG gating, CT scan of the coronary arteries was  performed without intravenous contrast. Coronary calcium scoring  was  performed according to the method of Agatston.    FINDINGS:  The score and distribution of calcium in the coronary arteries is as  follows:    LM 0,  LAD 20.27,  LCx 0,  RCA 0,    Total 20.27    The visualized mid/lower ascending thoracic aorta measures 3.7 cm in  diameter. The heart is normal in size. No pericardial effusion is  present.    No gross evidence of mediastinal or hilar lymphadenopathy or masses  is identified. The visualized segments of the lungs are normally  expanded.    The visualized subdiaphragmatic structures appear intact. Fatty liver.    Impression  1. Coronary artery calcium score of 20.27*.  2. Fatty liver.    *Coronary artery calcium scoring may be helpful in predicting the  risk for future coronary heart disease events.  According to the  American College of Cardiology Foundation Clinical Expert Consensus  Task Force, such testing provides important prognostic information in  patients with more than one coronary heart disease risk factor. The  coronary artery calcium score correlates with the annual risk of a  non-fatal myocardial infarction or coronary heart disease death.    Coronary artery score            Annual Risk    0-99                             0.4%  100-399                        1.3%  >400                            2.4%    These three \"breakpoints\" correspond to lower, intermediate and high  risk states for future coronary events.  Such information should be  used, along with appropriate clinical judgment, to make decisions  regarding the intensity of risk factor management strategies to treat  blood lipids and to modify other non-lipid coronary risk factors.    Reference: Indiahoma P et al. Circulation.  2007; " [id number]    AAA : No results found for this or any previous visit from the past 1825 days.    OTHER: No results found for this or any previous visit from the past 1825 days.    LAST IMAGING RESULTS  Vascular US IVC [place] [address]       Phone [phone] Fax [phone]       Vascular Lab Report     VASC US IVC ILIAC DUPLEX COMPLETE    Patient Name:      POLINA Stacy Physician:  [id number] Moseley MD  Study Date:        3/4/2024             Ordering Provider:  [id number] AVI QUINTANA  MRN/PID:           [id number]             Fellow:  Accession#:        [id number]         Technologist:       Peggy Hall RVT  Date of Birth/Age: 1960 / 63 years Technologist 2:  Gender:            M                    Encounter#:         [id number]  Admission Status:  Outpatient           Location Performed: [facility]       Diagnosis/ICD: Compression of vein-I87.1  CPT Codes:     58539 Duplex Aorta/IVC/Iliac/Bypass Graft       CONCLUSIONS:  Aorta/Common Iliac Arteries/IVC: The inferior vena cava appears patent with no evidence of thrombosis. The left common iliac vein stent appears patent. Technically difficult study due to body habitus and heavy overlying bowel gas.     Imaging & Doppler Findings:     [id number] Latricia Moseley MD  Electronically signed by Maria Esther Moseley MD on 3/5/2024 at 11:26:12 AM       ** Final **      Problem List Items Addressed This Visit       Chronic venous stasis dermatitis of both lower extremities    HTN (hypertension)    Chronic fatigue    Morbid obesity (Multi)    Coronary artery disease involving native coronary artery of native heart    (HFpEF) heart failure with preserved ejection fraction (Multi) - Primary    RADHA (obstructive sleep apnea)     Overview     9/28/23 Split night PSG ([place], BMI 56.7, ESS 23), all-supine, no optimal pressure up to CPAP 20 cm H20 (all hypopneas), switched to biPAP, centrals emerged (last obs ap at EPAP 14 cm H20--? mixed apnea); no optimal pressure noted. (+) PLMs noted.               Gorge Medeiros DO, FACC, FACOI

## 2024-07-15 ENCOUNTER — APPOINTMENT (OUTPATIENT)
Dept: CARDIOLOGY | Facility: CLINIC | Age: 64
End: 2024-07-15
Payer: COMMERCIAL

## 2024-07-15 VITALS
HEART RATE: 66 BPM | BODY MASS INDEX: 45.1 KG/M2 | DIASTOLIC BLOOD PRESSURE: 92 MMHG | HEIGHT: 70 IN | SYSTOLIC BLOOD PRESSURE: 180 MMHG | WEIGHT: 315 LBS

## 2024-07-15 DIAGNOSIS — G47.33 OSA (OBSTRUCTIVE SLEEP APNEA): ICD-10-CM

## 2024-07-15 DIAGNOSIS — I87.2 CHRONIC VENOUS STASIS DERMATITIS OF BOTH LOWER EXTREMITIES: ICD-10-CM

## 2024-07-15 DIAGNOSIS — E66.01 MORBID OBESITY (MULTI): ICD-10-CM

## 2024-07-15 DIAGNOSIS — R53.82 CHRONIC FATIGUE: ICD-10-CM

## 2024-07-15 DIAGNOSIS — R40.0 DAYTIME SOMNOLENCE: ICD-10-CM

## 2024-07-15 DIAGNOSIS — R53.83 OTHER FATIGUE: ICD-10-CM

## 2024-07-15 DIAGNOSIS — I10 PRIMARY HYPERTENSION: ICD-10-CM

## 2024-07-15 DIAGNOSIS — R07.89 OTHER CHEST PAIN: ICD-10-CM

## 2024-07-15 DIAGNOSIS — R06.02 SOBOE (SHORTNESS OF BREATH ON EXERTION): ICD-10-CM

## 2024-07-15 DIAGNOSIS — I25.10 CORONARY ARTERY DISEASE INVOLVING NATIVE CORONARY ARTERY OF NATIVE HEART WITHOUT ANGINA PECTORIS: ICD-10-CM

## 2024-07-15 DIAGNOSIS — I50.32 CHRONIC HEART FAILURE WITH PRESERVED EJECTION FRACTION (MULTI): Primary | ICD-10-CM

## 2024-07-15 PROCEDURE — 3077F SYST BP >= 140 MM HG: CPT | Performed by: INTERNAL MEDICINE

## 2024-07-15 PROCEDURE — 1036F TOBACCO NON-USER: CPT | Performed by: INTERNAL MEDICINE

## 2024-07-15 PROCEDURE — 99214 OFFICE O/P EST MOD 30 MIN: CPT | Performed by: INTERNAL MEDICINE

## 2024-07-15 PROCEDURE — 93000 ELECTROCARDIOGRAM COMPLETE: CPT | Performed by: INTERNAL MEDICINE

## 2024-07-15 PROCEDURE — 3008F BODY MASS INDEX DOCD: CPT | Performed by: INTERNAL MEDICINE

## 2024-07-15 PROCEDURE — 3080F DIAST BP >= 90 MM HG: CPT | Performed by: INTERNAL MEDICINE

## 2024-07-15 PROCEDURE — 4010F ACE/ARB THERAPY RXD/TAKEN: CPT | Performed by: INTERNAL MEDICINE

## 2024-07-19 DIAGNOSIS — I25.10 CORONARY ARTERY DISEASE INVOLVING NATIVE CORONARY ARTERY OF NATIVE HEART WITHOUT ANGINA PECTORIS: ICD-10-CM

## 2024-07-19 DIAGNOSIS — I87.2 CHRONIC VENOUS STASIS DERMATITIS OF BOTH LOWER EXTREMITIES: ICD-10-CM

## 2024-07-19 DIAGNOSIS — G47.33 OSA (OBSTRUCTIVE SLEEP APNEA): ICD-10-CM

## 2024-07-19 DIAGNOSIS — I50.32 CHRONIC HEART FAILURE WITH PRESERVED EJECTION FRACTION (MULTI): ICD-10-CM

## 2024-07-19 DIAGNOSIS — R53.82 CHRONIC FATIGUE: ICD-10-CM

## 2024-07-19 DIAGNOSIS — R07.89 OTHER CHEST PAIN: ICD-10-CM

## 2024-07-19 DIAGNOSIS — R06.02 SOBOE (SHORTNESS OF BREATH ON EXERTION): ICD-10-CM

## 2024-07-19 DIAGNOSIS — R53.83 OTHER FATIGUE: ICD-10-CM

## 2024-07-19 DIAGNOSIS — R40.0 DAYTIME SOMNOLENCE: ICD-10-CM

## 2024-07-19 DIAGNOSIS — I10 PRIMARY HYPERTENSION: ICD-10-CM

## 2024-07-19 DIAGNOSIS — E66.01 MORBID OBESITY (MULTI): ICD-10-CM

## 2024-07-19 RX ORDER — DILTIAZEM HYDROCHLORIDE 120 MG/1
CAPSULE, COATED, EXTENDED RELEASE ORAL
Qty: 90 CAPSULE | Refills: 3 | Status: SHIPPED | OUTPATIENT
Start: 2024-07-19

## 2024-07-23 ENCOUNTER — TELEPHONE (OUTPATIENT)
Dept: PRIMARY CARE | Facility: CLINIC | Age: 64
End: 2024-07-23
Payer: COMMERCIAL

## 2024-07-23 NOTE — TELEPHONE ENCOUNTER
On Saturday he fell on the floor --he was conscious / pain legs & back  She said this has happened with him 1-2-times a month over the past year.  He has not seen anyone for it.    Licha is asking if she can speak with you ? ([phone])    He does have appointment with you on 8-5-24  
Statement Selected

## 2024-07-29 ENCOUNTER — APPOINTMENT (OUTPATIENT)
Dept: NEUROLOGY | Facility: HOSPITAL | Age: 64
End: 2024-07-29
Payer: COMMERCIAL

## 2024-07-29 ENCOUNTER — OFFICE VISIT (OUTPATIENT)
Dept: WOUND CARE | Facility: CLINIC | Age: 64
End: 2024-07-29
Payer: COMMERCIAL

## 2024-07-29 ENCOUNTER — LAB (OUTPATIENT)
Dept: LAB | Facility: LAB | Age: 64
End: 2024-07-29
Payer: COMMERCIAL

## 2024-07-29 DIAGNOSIS — I50.32 CHRONIC HEART FAILURE WITH PRESERVED EJECTION FRACTION (MULTI): ICD-10-CM

## 2024-07-29 DIAGNOSIS — D50.0 ANEMIA DUE TO BLOOD LOSS: ICD-10-CM

## 2024-07-29 DIAGNOSIS — E66.01 CLASS 3 SEVERE OBESITY DUE TO EXCESS CALORIES WITH SERIOUS COMORBIDITY AND BODY MASS INDEX (BMI) OF 50.0 TO 59.9 IN ADULT (MULTI): ICD-10-CM

## 2024-07-29 DIAGNOSIS — G47.33 OSA (OBSTRUCTIVE SLEEP APNEA): ICD-10-CM

## 2024-07-29 DIAGNOSIS — I87.2 CHRONIC VENOUS STASIS DERMATITIS OF BOTH LOWER EXTREMITIES: ICD-10-CM

## 2024-07-29 DIAGNOSIS — R06.02 SOBOE (SHORTNESS OF BREATH ON EXERTION): ICD-10-CM

## 2024-07-29 DIAGNOSIS — M54.50 CHRONIC BILATERAL LOW BACK PAIN WITHOUT SCIATICA: ICD-10-CM

## 2024-07-29 DIAGNOSIS — G89.29 CHRONIC BILATERAL LOW BACK PAIN WITHOUT SCIATICA: ICD-10-CM

## 2024-07-29 DIAGNOSIS — I25.10 CORONARY ARTERY DISEASE INVOLVING NATIVE CORONARY ARTERY OF NATIVE HEART WITHOUT ANGINA PECTORIS: ICD-10-CM

## 2024-07-29 DIAGNOSIS — I10 PRIMARY HYPERTENSION: ICD-10-CM

## 2024-07-29 DIAGNOSIS — R53.83 OTHER FATIGUE: ICD-10-CM

## 2024-07-29 DIAGNOSIS — Z86.39 HISTORY OF HYPERTHYROIDISM: ICD-10-CM

## 2024-07-29 DIAGNOSIS — R53.82 CHRONIC FATIGUE: ICD-10-CM

## 2024-07-29 DIAGNOSIS — R40.0 DAYTIME SOMNOLENCE: ICD-10-CM

## 2024-07-29 DIAGNOSIS — E29.1 HYPOGONADISM MALE: ICD-10-CM

## 2024-07-29 DIAGNOSIS — R07.89 OTHER CHEST PAIN: ICD-10-CM

## 2024-07-29 DIAGNOSIS — E11.9 DIET-CONTROLLED DIABETES MELLITUS (MULTI): ICD-10-CM

## 2024-07-29 DIAGNOSIS — E66.01 MORBID OBESITY (MULTI): ICD-10-CM

## 2024-07-29 LAB
ALBUMIN SERPL BCP-MCNC: 4.1 G/DL (ref 3.4–5)
ALP SERPL-CCNC: 56 U/L (ref 33–136)
ALT SERPL W P-5'-P-CCNC: 26 U/L (ref 10–52)
ANION GAP SERPL CALC-SCNC: 12 MMOL/L (ref 10–20)
AST SERPL W P-5'-P-CCNC: 20 U/L (ref 9–39)
BASOPHILS # BLD AUTO: 0.07 X10*3/UL (ref 0–0.1)
BASOPHILS NFR BLD AUTO: 1.1 %
BILIRUB SERPL-MCNC: 0.5 MG/DL (ref 0–1.2)
BNP SERPL-MCNC: 44 PG/ML (ref 0–99)
BUN SERPL-MCNC: 14 MG/DL (ref 6–23)
CALCIUM SERPL-MCNC: 9.6 MG/DL (ref 8.6–10.3)
CHLORIDE SERPL-SCNC: 104 MMOL/L (ref 98–107)
CHOLEST SERPL-MCNC: 154 MG/DL (ref 0–199)
CHOLESTEROL/HDL RATIO: 2.9
CO2 SERPL-SCNC: 27 MMOL/L (ref 21–32)
CREAT SERPL-MCNC: 0.86 MG/DL (ref 0.5–1.3)
EGFRCR SERPLBLD CKD-EPI 2021: >90 ML/MIN/1.73M*2
EOSINOPHIL # BLD AUTO: 0.18 X10*3/UL (ref 0–0.7)
EOSINOPHIL NFR BLD AUTO: 2.7 %
ERYTHROCYTE [DISTWIDTH] IN BLOOD BY AUTOMATED COUNT: 17.2 % (ref 11.5–14.5)
EST. AVERAGE GLUCOSE BLD GHB EST-MCNC: 123 MG/DL
GLUCOSE SERPL-MCNC: 93 MG/DL (ref 74–99)
HBA1C MFR BLD: 5.9 %
HCT VFR BLD AUTO: 44.2 % (ref 41–52)
HDLC SERPL-MCNC: 52.7 MG/DL
HGB BLD-MCNC: 12.9 G/DL (ref 13.5–17.5)
IMM GRANULOCYTES # BLD AUTO: 0.01 X10*3/UL (ref 0–0.7)
IMM GRANULOCYTES NFR BLD AUTO: 0.2 % (ref 0–0.9)
LDLC SERPL CALC-MCNC: 54 MG/DL
LYMPHOCYTES # BLD AUTO: 2 X10*3/UL (ref 1.2–4.8)
LYMPHOCYTES NFR BLD AUTO: 30.1 %
MAGNESIUM SERPL-MCNC: 2.05 MG/DL (ref 1.6–2.4)
MCH RBC QN AUTO: 27 PG (ref 26–34)
MCHC RBC AUTO-ENTMCNC: 29.2 G/DL (ref 32–36)
MCV RBC AUTO: 93 FL (ref 80–100)
MONOCYTES # BLD AUTO: 0.65 X10*3/UL (ref 0.1–1)
MONOCYTES NFR BLD AUTO: 9.8 %
NEUTROPHILS # BLD AUTO: 3.74 X10*3/UL (ref 1.2–7.7)
NEUTROPHILS NFR BLD AUTO: 56.1 %
NON HDL CHOLESTEROL: 101 MG/DL (ref 0–149)
NRBC BLD-RTO: 0 /100 WBCS (ref 0–0)
PLATELET # BLD AUTO: 245 X10*3/UL (ref 150–450)
POTASSIUM SERPL-SCNC: 4.7 MMOL/L (ref 3.5–5.3)
PROT SERPL-MCNC: 7.2 G/DL (ref 6.4–8.2)
PSA SERPL-MCNC: 0.22 NG/ML
RBC # BLD AUTO: 4.77 X10*6/UL (ref 4.5–5.9)
SODIUM SERPL-SCNC: 138 MMOL/L (ref 136–145)
TESTOST SERPL-MCNC: 78 NG/DL (ref 240–1000)
TRIGL SERPL-MCNC: 236 MG/DL (ref 0–149)
TSH SERPL-ACNC: 0.86 MIU/L (ref 0.44–3.98)
VLDL: 47 MG/DL (ref 0–40)
WBC # BLD AUTO: 6.7 X10*3/UL (ref 4.4–11.3)

## 2024-07-29 PROCEDURE — 11042 DBRDMT SUBQ TIS 1ST 20SQCM/<: CPT

## 2024-07-29 PROCEDURE — 84443 ASSAY THYROID STIM HORMONE: CPT

## 2024-07-29 PROCEDURE — 83880 ASSAY OF NATRIURETIC PEPTIDE: CPT

## 2024-07-29 PROCEDURE — 83735 ASSAY OF MAGNESIUM: CPT

## 2024-07-29 PROCEDURE — 84153 ASSAY OF PSA TOTAL: CPT

## 2024-07-29 PROCEDURE — 36415 COLL VENOUS BLD VENIPUNCTURE: CPT

## 2024-07-29 PROCEDURE — 80053 COMPREHEN METABOLIC PANEL: CPT

## 2024-07-29 PROCEDURE — 83036 HEMOGLOBIN GLYCOSYLATED A1C: CPT

## 2024-07-29 PROCEDURE — 80061 LIPID PANEL: CPT

## 2024-07-29 PROCEDURE — 84403 ASSAY OF TOTAL TESTOSTERONE: CPT

## 2024-07-29 PROCEDURE — 85025 COMPLETE CBC W/AUTO DIFF WBC: CPT

## 2024-07-29 PROCEDURE — 11045 DBRDMT SUBQ TISS EACH ADDL: CPT

## 2024-07-30 ENCOUNTER — TELEPHONE (OUTPATIENT)
Dept: CARDIOLOGY | Facility: CLINIC | Age: 64
End: 2024-07-30
Payer: COMMERCIAL

## 2024-07-30 NOTE — TELEPHONE ENCOUNTER
----- Message from Gorge Medeiros sent at 7/29/2024  4:23 PM EDT -----  Lipid panel shows an LDL cholesterol 54 and triglycerides of 236.  CMP showed normal serum sodium and potassium with a serum creatinine of 0.86, normal ALT/AST, serum magnesium was within normal limits at 2.05.  Patient can continue current care

## 2024-07-30 NOTE — TELEPHONE ENCOUNTER
7/30/24  0955  Called patient; no answer. Left brief voice message informing of lab results and to call if questions.    Maggi THORNE

## 2024-08-05 ENCOUNTER — APPOINTMENT (OUTPATIENT)
Dept: WOUND CARE | Facility: CLINIC | Age: 64
End: 2024-08-05
Payer: COMMERCIAL

## 2024-08-05 ENCOUNTER — APPOINTMENT (OUTPATIENT)
Dept: PRIMARY CARE | Facility: CLINIC | Age: 64
End: 2024-08-05
Payer: COMMERCIAL

## 2024-08-05 VITALS
DIASTOLIC BLOOD PRESSURE: 78 MMHG | HEIGHT: 70 IN | RESPIRATION RATE: 16 BRPM | OXYGEN SATURATION: 95 % | HEART RATE: 73 BPM | BODY MASS INDEX: 45.1 KG/M2 | WEIGHT: 315 LBS | SYSTOLIC BLOOD PRESSURE: 118 MMHG

## 2024-08-05 DIAGNOSIS — R07.89 OTHER CHEST PAIN: ICD-10-CM

## 2024-08-05 DIAGNOSIS — E66.01 CLASS 3 SEVERE OBESITY DUE TO EXCESS CALORIES WITH SERIOUS COMORBIDITY AND BODY MASS INDEX (BMI) OF 45.0 TO 49.9 IN ADULT (MULTI): ICD-10-CM

## 2024-08-05 DIAGNOSIS — I89.0 LYMPHEDEMA: ICD-10-CM

## 2024-08-05 DIAGNOSIS — G89.29 CHRONIC BILATERAL LOW BACK PAIN WITHOUT SCIATICA: ICD-10-CM

## 2024-08-05 DIAGNOSIS — I50.32 CHRONIC HEART FAILURE WITH PRESERVED EJECTION FRACTION (MULTI): ICD-10-CM

## 2024-08-05 DIAGNOSIS — I48.0 PAROXYSMAL ATRIAL FIBRILLATION (MULTI): ICD-10-CM

## 2024-08-05 DIAGNOSIS — Z79.01 CHRONIC ANTICOAGULATION: Primary | ICD-10-CM

## 2024-08-05 DIAGNOSIS — M54.50 CHRONIC BILATERAL LOW BACK PAIN WITHOUT SCIATICA: ICD-10-CM

## 2024-08-05 DIAGNOSIS — D50.0 ANEMIA DUE TO BLOOD LOSS: ICD-10-CM

## 2024-08-05 DIAGNOSIS — I10 PRIMARY HYPERTENSION: ICD-10-CM

## 2024-08-05 DIAGNOSIS — E11.9 DIET-CONTROLLED DIABETES MELLITUS (MULTI): ICD-10-CM

## 2024-08-05 DIAGNOSIS — G47.31 CENTRAL SLEEP APNEA: ICD-10-CM

## 2024-08-05 PROCEDURE — 1036F TOBACCO NON-USER: CPT | Performed by: FAMILY MEDICINE

## 2024-08-05 PROCEDURE — 3074F SYST BP LT 130 MM HG: CPT | Performed by: FAMILY MEDICINE

## 2024-08-05 PROCEDURE — 99396 PREV VISIT EST AGE 40-64: CPT | Performed by: FAMILY MEDICINE

## 2024-08-05 PROCEDURE — 4010F ACE/ARB THERAPY RXD/TAKEN: CPT | Performed by: FAMILY MEDICINE

## 2024-08-05 PROCEDURE — 3048F LDL-C <100 MG/DL: CPT | Performed by: FAMILY MEDICINE

## 2024-08-05 PROCEDURE — 3078F DIAST BP <80 MM HG: CPT | Performed by: FAMILY MEDICINE

## 2024-08-05 PROCEDURE — 3044F HG A1C LEVEL LT 7.0%: CPT | Performed by: FAMILY MEDICINE

## 2024-08-05 PROCEDURE — 3008F BODY MASS INDEX DOCD: CPT | Performed by: FAMILY MEDICINE

## 2024-08-05 ASSESSMENT — ANXIETY QUESTIONNAIRES
GAD7 TOTAL SCORE: 0
IF YOU CHECKED OFF ANY PROBLEMS ON THIS QUESTIONNAIRE, HOW DIFFICULT HAVE THESE PROBLEMS MADE IT FOR YOU TO DO YOUR WORK, TAKE CARE OF THINGS AT HOME, OR GET ALONG WITH OTHER PEOPLE: NOT DIFFICULT AT ALL
6. BECOMING EASILY ANNOYED OR IRRITABLE: NOT AT ALL
4. TROUBLE RELAXING: NOT AT ALL
1. FEELING NERVOUS, ANXIOUS, OR ON EDGE: NOT AT ALL
7. FEELING AFRAID AS IF SOMETHING AWFUL MIGHT HAPPEN: NOT AT ALL
5. BEING SO RESTLESS THAT IT IS HARD TO SIT STILL: NOT AT ALL
3. WORRYING TOO MUCH ABOUT DIFFERENT THINGS: NOT AT ALL
2. NOT BEING ABLE TO STOP OR CONTROL WORRYING: NOT AT ALL

## 2024-08-05 ASSESSMENT — ENCOUNTER SYMPTOMS
LOSS OF SENSATION IN FEET: 0
DEPRESSION: 0
OCCASIONAL FEELINGS OF UNSTEADINESS: 0
WOUND: 1
BACK PAIN: 1

## 2024-08-05 NOTE — PROGRESS NOTES
Subjective   Hugo Gauthier is a 63 y.o. male and is here for a comprehensive physical exam. The patient reports problems - he is currently following with wound care, and is frustrated with left leg.  Specialists include  Neurosurgery [facility]  Neurology [facility] Sleep apnea  Cardiology Dr. Medeiros  Wound Care  Vascular surgery  Currently on light duty at work.   Last physical: 2023  Changes no  Concerns no  Dentist yes  Vision no  Hearing Problems no  Diet yes  Exercise no  Alcohol no  Drugs no  Social History     Tobacco Use   Smoking Status Former    Current packs/day: 1.00    Average packs/day: 1 pack/day for 20.0 years (20.0 ttl pk-yrs)    Types: Cigarettes    Passive exposure: Never   Smokeless Tobacco Never          Do you take any herbs or supplements that were not prescribed by a doctor? yes  Are you taking calcium supplements? no  Are you taking aspirin daily? no      History:  Date last PSA:  2024    Do you have pain that bothers you in your daily life? yes Chronic back pain   Review of Systems   Cardiovascular:  Positive for leg swelling.   Musculoskeletal:  Positive for back pain.   Skin:  Positive for wound.   All other systems reviewed and are negative.       Objective   Physical Exam  Vitals reviewed.   Constitutional:       Appearance: Normal appearance. He is obese.   HENT:      Head: Normocephalic and atraumatic.      Right Ear: Tympanic membrane normal.      Left Ear: Tympanic membrane normal.      Nose: Nose normal.      Mouth/Throat:      Mouth: Mucous membranes are moist.      Pharynx: Oropharynx is clear.   Eyes:      Extraocular Movements: Extraocular movements intact.      Conjunctiva/sclera: Conjunctivae normal.      Pupils: Pupils are equal, round, and reactive to light.   Cardiovascular:      Rate and Rhythm: Normal rate and regular rhythm.      Pulses: Normal pulses.      Heart sounds: Normal heart sounds.   Pulmonary:      Effort: Pulmonary effort is normal.      Breath sounds: Normal breath  sounds.   Abdominal:      General: Abdomen is flat. Bowel sounds are normal.      Palpations: Abdomen is soft.   Musculoskeletal:         General: Normal range of motion.      Cervical back: Normal range of motion and neck supple.   Skin:     General: Skin is warm and dry.      Capillary Refill: Capillary refill takes less than 2 seconds.      Findings: Lesion present.      Comments: LLE wounds that are under care of wound care. Using Santyl.    Neurological:      General: No focal deficit present.      Mental Status: He is alert and oriented to person, place, and time.   Psychiatric:         Mood and Affect: Mood normal.         Behavior: Behavior normal.         Assessment/Plan   Healthy male exam. 63 year old male for annual wellness exam.      1. Labs reveiwed and look good, triglycerides are improved, continue to watch carbohydrates.   INR standing order testing written.   2. Patient Counseling:  --Nutrition: Stressed importance of moderation in sodium/caffeine intake, saturated fat and cholesterol, caloric balance, sufficient intake of fresh fruits, vegetables, fiber, calcium, iron, and 1 mg of folate supplement per day (for females capable of pregnancy).  --Discussed the issue of estrogen replacement, calcium supplement, and the daily use of baby aspirin.  --Exercise: Stressed the importance of regular exercise.   --Substance Abuse: Discussed cessation/primary prevention of tobacco, alcohol, or other drug use; driving or other dangerous activities under the influence; availability of treatment for abuse.    --Sexuality: Discussed sexually transmitted diseases, partner selection, use of condoms, avoidance of unintended pregnancy  and contraceptive alternatives.   --Injury prevention: Discussed safety belts, safety helmets, smoke detector, smoking near bedding or upholstery.   --Dental health: Discussed importance of regular tooth brushing, flossing, and dental visits.  --Immunizations reviewed.  --Discussed  benefits of screening colonoscopy.  --After hours service discussed with patient  3. Discussed the patient's BMI with him.  The BMI is above average. The patient received discussion and is losing weight, on his current plan because they have an above normal BMI.  4. Follow up in one year

## 2024-08-05 NOTE — PATIENT INSTRUCTIONS
Healthy male exam. 63 year old male for annual wellness exam.      1. Labs reveiwed and look good, triglycerides are improved, continue to watch carbohydrates.   INR standing order testing written.   2. Patient Counseling:  --Nutrition: Stressed importance of moderation in sodium/caffeine intake, saturated fat and cholesterol, caloric balance, sufficient intake of fresh fruits, vegetables, fiber, calcium, iron, and 1 mg of folate supplement per day (for females capable of pregnancy).  --Discussed the issue of estrogen replacement, calcium supplement, and the daily use of baby aspirin.  --Exercise: Stressed the importance of regular exercise.   --Substance Abuse: Discussed cessation/primary prevention of tobacco, alcohol, or other drug use; driving or other dangerous activities under the influence; availability of treatment for abuse.    --Sexuality: Discussed sexually transmitted diseases, partner selection, use of condoms, avoidance of unintended pregnancy  and contraceptive alternatives.   --Injury prevention: Discussed safety belts, safety helmets, smoke detector, smoking near bedding or upholstery.   --Dental health: Discussed importance of regular tooth brushing, flossing, and dental visits.  --Immunizations reviewed.  --Discussed benefits of screening colonoscopy.  --After hours service discussed with patient  3. Discussed the patient's BMI with him.  The BMI is above average. The patient received discussion and is losing weight, on his current plan because they have an above normal BMI.  4. Follow up in one year

## 2024-08-12 ENCOUNTER — OFFICE VISIT (OUTPATIENT)
Dept: NEUROLOGY | Facility: HOSPITAL | Age: 64
End: 2024-08-12
Payer: COMMERCIAL

## 2024-08-12 VITALS
WEIGHT: 315 LBS | BODY MASS INDEX: 46.39 KG/M2 | DIASTOLIC BLOOD PRESSURE: 89 MMHG | HEART RATE: 72 BPM | SYSTOLIC BLOOD PRESSURE: 154 MMHG

## 2024-08-12 DIAGNOSIS — G47.33 OSA (OBSTRUCTIVE SLEEP APNEA): Primary | ICD-10-CM

## 2024-08-12 DIAGNOSIS — G47.61 PERIODIC LIMB MOVEMENTS OF SLEEP: ICD-10-CM

## 2024-08-12 DIAGNOSIS — G47.31 CENTRAL SLEEP APNEA: ICD-10-CM

## 2024-08-12 DIAGNOSIS — E66.01 CLASS 3 SEVERE OBESITY DUE TO EXCESS CALORIES WITH SERIOUS COMORBIDITY AND BODY MASS INDEX (BMI) OF 45.0 TO 49.9 IN ADULT (MULTI): ICD-10-CM

## 2024-08-12 PROCEDURE — 99213 OFFICE O/P EST LOW 20 MIN: CPT | Performed by: PSYCHIATRY & NEUROLOGY

## 2024-08-12 PROCEDURE — 3079F DIAST BP 80-89 MM HG: CPT | Performed by: PSYCHIATRY & NEUROLOGY

## 2024-08-12 PROCEDURE — 1036F TOBACCO NON-USER: CPT | Performed by: PSYCHIATRY & NEUROLOGY

## 2024-08-12 PROCEDURE — 3077F SYST BP >= 140 MM HG: CPT | Performed by: PSYCHIATRY & NEUROLOGY

## 2024-08-12 PROCEDURE — 4010F ACE/ARB THERAPY RXD/TAKEN: CPT | Performed by: PSYCHIATRY & NEUROLOGY

## 2024-08-12 PROCEDURE — 3044F HG A1C LEVEL LT 7.0%: CPT | Performed by: PSYCHIATRY & NEUROLOGY

## 2024-08-12 PROCEDURE — 3048F LDL-C <100 MG/DL: CPT | Performed by: PSYCHIATRY & NEUROLOGY

## 2024-08-12 PROCEDURE — G2211 COMPLEX E/M VISIT ADD ON: HCPCS | Performed by: PSYCHIATRY & NEUROLOGY

## 2024-08-12 ASSESSMENT — ENCOUNTER SYMPTOMS
LOSS OF SENSATION IN FEET: 1
OCCASIONAL FEELINGS OF UNSTEADINESS: 1
SLEEPING PROBLEMS DURING THE LAST MONTH: 1
DEPRESSION: 0

## 2024-08-12 ASSESSMENT — PAIN SCALES - GENERAL: PAINLEVEL: 3

## 2024-08-12 NOTE — PATIENT INSTRUCTIONS
Pt wants to wait another 6 months, try to lose weight more  Consider repeat PAP titration later    Rtc 3/2025

## 2024-08-12 NOTE — PROGRESS NOTES
Follow-up visit    Visit type: Follow-up    PCP: Edilma Luna MD.    Subjective     Hugo Gauthier is a 63 y.o. year old male here for follow-up. Last seen 2/2/24.     Patient is alone.       HPI    I first saw him 11/8/23 for RADHA. States he was having some chronic wound issues in [place]. Saw vascular surgery, who referred him to see cardiologist Dr Medeiros. Among other things, he ordered sleep study. Sleep study done, pt referred here. Tired/sleepy during the daytime. Obese, BMI 50+. Able to sleep supine only per pt due to habitus. Can fall asleep while seated at edge of bed sometimes. (+) drowsy driving. Had 1 sleep study done. States he didn't sleep well. When travelling in past usually cannot sleep well first night in the hotel. Pt states leg movements sometimes a problem sometimes not during sleep. Former smoker. No alcohol/no street drug use. Pt diagnosed as having severe RADHA with hypoxemia, with PAP emergent central sleep apnea and unsuccessful PAP titration to bilevel PAP ASV. Last visit, discussed options and serious weight loss. Pt opted to lose more weight first.    Studies so far:  9/28/23 Split night PSG ([place], BMI 56.7, ESS 23), all-supine, severe RADHA, no sig hypoxemia. No optimal pressure up to CPAP 20 cm H20 (all hypopneas), switched to biPAP, centrals emerged (last obs ap at EPAP 14 cm H20--? mixed apnea); no optimal pressure noted. (+) PLMs noted.     12/18/23 BiPAP study ([place], BMI 53, ESS 23) showed unsuccessful titration up to bilevel PAP titration and bilevel PAP S/T titration 28/24 cm H20 with BUR 10 in this all-supine study. Treatment-emergent central sleep apnea noted. No PLMs. Pt couldn't tolerate higher starting pressure.     1/26/24 ASV study ([place], BMI 51, ESS 23).  Unsuccessful titration, all-supine, with HOB elevated up to 20 degrees. Sleep-related hypoxemia present during study. PLMs noted (PLM index 83.5).     Here today for follow-up.    Has lost another 30 lbs in 6 months.  Last seen 2/2/24 355 lbs, now 323 lbs. Has worked with nutritionist in past. Dieting. No medications for weight loss. Limiting carbs. Eats protein.    Still sleeping on back. Sometimes sleeps in chair. He finds he is sleeping a whole lot better sitting up.      Patient Active Problem List   Diagnosis    Atrial fibrillation (Multi)    Anemia due to blood loss    Chronic venous stasis dermatitis of both lower extremities    History of hyperthyroidism    HTN (hypertension)    Chronic bilateral low back pain without sciatica    Lesion of lower extremity    Lymphedema    Onychomycosis of toenail    Ulcer, venous stasis (Multi)    Diet-controlled diabetes mellitus (Multi)    Class 3 severe obesity due to excess calories with serious comorbidity and body mass index (BMI) of 45.0 to 49.9 in adult (Multi)    Other fatigue    Hypogonadism male    SOBOE (shortness of breath on exertion)    Chronic fatigue    Daytime somnolence    Morbid obesity (Multi)    Urinary frequency    Tinea cruris    Test anxiety    Poison ivy dermatitis    Nevus of scalp    May-Thurner syndrome    Actinic keratosis    Cutaneous horn    Dysuria    Blood in urine    Abnormal nuclear stress test    History of iron deficiency anemia    Anticoagulated by anticoagulation treatment    Coronary artery disease involving native coronary artery of native heart    (HFpEF) heart failure with preserved ejection fraction (Multi)    Other chest pain    RADHA (obstructive sleep apnea)    Preoperative cardiovascular examination    Central sleep apnea    Periodic limb movements of sleep    Iliac vein stenosis, right    Pubic lice    Low back pain    Candidiasis of intestine    Muscle spasm of right lower extremity    Erectile dysfunction       No Known Allergies    Current Outpatient Medications:     alpha tocopherol (Vitamin E) 268 mg (400 unit) capsule, Take by mouth., Disp: , Rfl:     ascorbic acid (Vitamin C) 1,000 mg tablet, Take 1 tablet (1,000 mg) by mouth once  "daily., Disp: , Rfl:     BD Luer-Juliette Syringe 3 mL 25 gauge x 1\" syringe, 1 mL every 14 (fourteen) days. Using syringe. 2 per month., Disp: 2 each, Rfl: 0    cholecalciferol (Vitamin D-3) 50 MCG (2000 UT) tablet, Take 1 tablet (2,000 Units) by mouth once daily., Disp: , Rfl:     dilTIAZem CD (Cardizem CD) 120 mg 24 hr capsule, TAKE 1 CAPSULE(120 MG) BY MOUTH EVERY DAY, Disp: 90 capsule, Rfl: 3    folic acid (Folvite) 800 mcg tablet, Take 1 tablet (800 mcg) by mouth once daily., Disp: , Rfl:     furosemide (Lasix) 40 mg tablet, Take 1 tablet (40 mg) by mouth once daily., Disp: 90 tablet, Rfl: 1    iodine, bulk, crystals, USE AS DIRECTED., Disp: , Rfl:     ketoconazole (NIZOral) 2 % cream, APPLY SPARINGLY TO AFFECTED AREA(S) TWICE DAILY, Disp: 60 g, Rfl: 0    lisinopril 20 mg tablet, Take 1 tablet (20 mg) by mouth once daily., Disp: 90 tablet, Rfl: 3    magnesium oxide (Mag-Ox) 400 mg tablet, Take 1 tablet (400 mg) by mouth once daily., Disp: , Rfl:     SantyL 250 unit/gram ointment, , Disp: , Rfl:     selenium 200 mcg tablet, Take 1 tablet (200,000 mcg) by mouth once daily., Disp: , Rfl:     silver sulfADIAZINE (Silvadene) 1 % cream, APPLY AND RUB IN A THIN FILM TO AFFECTED AREAS TWICE DAILY.(AM AND PM)., Disp: 85 g, Rfl: 0    spironolactone (Aldactone) 25 mg tablet, Take 1 tablet (25 mg) by mouth once daily., Disp: 180 tablet, Rfl: 1    testosterone cypionate (Depo-Testosterone) 200 mg/mL injection, Inject 1 mL (200 mg) into the muscle every 14 (fourteen) days., Disp: 2 mL, Rfl: 5    tiZANidine (Zanaflex) 2 mg tablet, TAKE ONE TABLET BY MOUTH AT BEDTIME, Disp: 30 tablet, Rfl: 0    warfarin (Coumadin) 1 mg tablet, TAKE 1 TABLET BY MOUTH EVERY DAY, Disp: 30 tablet, Rfl: 0    warfarin (Coumadin) 4 mg tablet, 4 mg orally daily (Patient taking differently: 4.5 mg once daily. 4 mg orally daily), Disp: 30 tablet, Rfl: 11    zinc gluconate 100 mg tablet, Take 1 tablet (100 mg) by mouth once daily., Disp: , Rfl: 0    " sildenafil (Viagra) 100 mg tablet, Take 1 tablet (100 mg) by mouth once daily as needed for erectile dysfunction. (Patient not taking: Reported on 8/12/2024), Disp: 30 tablet, Rfl: 0     Objective     /89   Pulse 72   Wt 147 kg (323 lb 4.8 oz)   BMI 46.39 kg/m²        Awake, alert, oriented x3, in no distress  Well-nourished, ambulatory independently    Mental status exam as above, conversant   Full EOMs intact, no nystagmus, no ptosis   No facial droop   Hearing grossly intact   No dysarthria    Motor strength is at least antigravity on all extremities  Normal gait      Lab Results   Component Value Date    WBC 6.7 07/29/2024    RBC 4.77 07/29/2024    HGB 12.9 (L) 07/29/2024    HCT 44.2 07/29/2024     07/29/2024     07/29/2024    K 4.7 07/29/2024     07/29/2024    BUN 14 07/29/2024    CREATININE 0.86 07/29/2024    EGFR >90 07/29/2024    CALCIUM 9.6 07/29/2024    ALKPHOS 56 07/29/2024    AST 20 07/29/2024    ALT 26 07/29/2024    MG 2.05 07/29/2024    HGBA1C 5.9 (H) 07/29/2024    LDLCALC 54 07/29/2024    CHOL 154 07/29/2024    HDL 52.7 07/29/2024    TRIG 236 (H) 07/29/2024    TSH 0.86 07/29/2024        Assessment/Plan   RADHA, severe 2023  Central sleep apnea, PAP-emergent 2023  Complex sleep apnea  EF normal  Severe RADHA on diagnostic PSG, no sig hypoxemia noted  No optimal pressure noted even up to BiPAP/BiPAP S/T and ASV titration (even with HOB up 20 deg)--all supine  BMI 56-->now 46  Pt only able to sleep spine (UNABLE to sleep non-supine due to habitus--per pt, his belly)    Pt still not done with weight loss. He is determined to lose more weight and wants to wait further before redoing a PAP titration study.    Discussed, ok with me in waiting a few more months, but doubt degree of RADHA will be significantly less to the point he may not need PAP device anymore--though hopefully will be able to identify good pressure at that time.  He is not sleepy.    4. Morbid obesity  Motivated to  lose weight and is losing weight    5. Periodic limb movements of sleep  Asymptomatic      Plans:  Pt wants to wait another 6 months, try to lose weight more  Consider repeat PAP titration later    Rtc 3/2025    All questions were answered.  Pt knows how to contact my office in case pt has any questions or concerns.    Gerald Hess MD

## 2024-08-26 ENCOUNTER — OFFICE VISIT (OUTPATIENT)
Dept: WOUND CARE | Facility: CLINIC | Age: 64
End: 2024-08-26
Payer: COMMERCIAL

## 2024-08-26 PROCEDURE — 11042 DBRDMT SUBQ TIS 1ST 20SQCM/<: CPT

## 2024-08-26 PROCEDURE — 11045 DBRDMT SUBQ TISS EACH ADDL: CPT

## 2024-09-23 ENCOUNTER — OFFICE VISIT (OUTPATIENT)
Dept: WOUND CARE | Facility: CLINIC | Age: 64
End: 2024-09-23
Payer: COMMERCIAL

## 2024-09-23 PROCEDURE — 11045 DBRDMT SUBQ TISS EACH ADDL: CPT

## 2024-09-23 PROCEDURE — 11042 DBRDMT SUBQ TIS 1ST 20SQCM/<: CPT

## 2024-10-03 DIAGNOSIS — E29.1 HYPOGONADISM MALE: ICD-10-CM

## 2024-10-04 RX ORDER — TESTOSTERONE CYPIONATE 200 MG/ML
200 INJECTION, SOLUTION INTRAMUSCULAR
Qty: 2 ML | Refills: 5 | Status: SHIPPED | OUTPATIENT
Start: 2024-10-04

## 2024-10-21 ENCOUNTER — OFFICE VISIT (OUTPATIENT)
Dept: WOUND CARE | Facility: CLINIC | Age: 64
End: 2024-10-21
Payer: COMMERCIAL

## 2024-10-21 PROCEDURE — 11042 DBRDMT SUBQ TIS 1ST 20SQCM/<: CPT

## 2024-10-21 PROCEDURE — 11045 DBRDMT SUBQ TISS EACH ADDL: CPT

## 2024-11-18 ENCOUNTER — OFFICE VISIT (OUTPATIENT)
Dept: WOUND CARE | Facility: CLINIC | Age: 64
End: 2024-11-18
Payer: COMMERCIAL

## 2024-11-18 PROCEDURE — 11042 DBRDMT SUBQ TIS 1ST 20SQCM/<: CPT

## 2024-11-18 PROCEDURE — 11045 DBRDMT SUBQ TISS EACH ADDL: CPT

## 2024-12-02 ENCOUNTER — EVALUATION (OUTPATIENT)
Dept: PHYSICAL THERAPY | Facility: CLINIC | Age: 64
End: 2024-12-02
Payer: COMMERCIAL

## 2024-12-02 DIAGNOSIS — M47.14 OTHER SPONDYLOSIS WITH MYELOPATHY, THORACIC REGION: ICD-10-CM

## 2024-12-02 DIAGNOSIS — M48.062 SPINAL STENOSIS, LUMBAR REGION WITH NEUROGENIC CLAUDICATION: ICD-10-CM

## 2024-12-02 DIAGNOSIS — M48.062 SPINAL STENOSIS OF LUMBAR REGION WITH NEUROGENIC CLAUDICATION: Primary | ICD-10-CM

## 2024-12-02 PROBLEM — M48.061 LUMBAR SPINAL STENOSIS: Status: ACTIVE | Noted: 2024-12-02

## 2024-12-02 PROCEDURE — 97161 PT EVAL LOW COMPLEX 20 MIN: CPT | Mod: GP

## 2024-12-02 PROCEDURE — 97110 THERAPEUTIC EXERCISES: CPT | Mod: GP

## 2024-12-02 ASSESSMENT — PAIN - FUNCTIONAL ASSESSMENT: PAIN_FUNCTIONAL_ASSESSMENT: 0-10

## 2024-12-02 ASSESSMENT — ENCOUNTER SYMPTOMS
OCCASIONAL FEELINGS OF UNSTEADINESS: 0
DEPRESSION: 0
LOSS OF SENSATION IN FEET: 1

## 2024-12-02 ASSESSMENT — PAIN SCALES - GENERAL: PAINLEVEL_OUTOF10: 1

## 2024-12-02 NOTE — PROGRESS NOTES
Physical Therapy    Physical Therapy Evaluation and Treatment      Patient Name: Hugo Gauthier  MRN: [id number]  Today's Date: 2024   Time Entry:   Time Calculation  Start Time: 730  Stop Time: 815  Time Calculation (min): 45 min  PT Evaluation Time Entry  PT Evaluation (Low) Time Entry: 30  Visit # 1    PT Therapeutic Procedures Time Entry  Therapeutic Exercise Time Entry: 15                   Assessment:  PT Assessment  PT Assessment Results: Decreased mobility, Decreased range of motion, Pain  Rehab Prognosis: Good   The patient is a 64 year old male presenting to PT with LBP and limited trunk ROM.  He was provided with a HEP and will be put on hold for 1 month.    Plan:  OP PT Plan  Treatment/Interventions: Cryotherapy, Education/ Instruction, Hot pack, Manual therapy, Therapeutic exercises  PT Plan: Skilled PT  PT Frequency: Other (Comment) (HOLD for 1 month)  Number of Treatments Authorized: 30 V per year  Rehab Potential: Good  Plan of Care Agreement: Patient    Current Problem:   1. Spinal stenosis of lumbar region with neurogenic claudication        2. Spinal stenosis, lumbar region with neurogenic claudication  Referral to Physical Therapy      3. Other spondylosis with myelopathy, thoracic region  Referral to Physical Therapy      Name &  confirmed by patient.    Subjective    General:  General  Reason for Referral: lumbar stenosis with neurogenic claudication  Referred By: Elaina ROA  The patient reports he has been experiencing LBP for ~the past two years.  Pain ranges from 1-8/10.  Follow up with Dr. Landers in 6 months.  Previous imaging revealed lumbar stenosis L3-4.  The patient's goal is to be provided with a HEP for balance and stretching.      Precautions:  Precautions  STEADI Fall Risk Score (The score of 4 or more indicates an increased risk of falling): 5     Pain:  Pain Assessment  Pain Assessment: 0-10  0-10 (Numeric) Pain Score: 1  Pain Location: Back  Pain Orientation:  "Lower    Prior Level of Function:  Prior Function Per Pt/Caregiver Report  Level of [place]: Independent with ADLs and functional transfers    Objective   Trunk AROM (degrees)  Flexion = 54  Extension = -5     Outcome Measures:  Other Measures  Oswestry Disablity Index (DOMINIQUE): 27     Treatments:   Seated HS stretch foot on ground - x5 ea 10\"H   Standing trunk flexion stretch - x5 10\"H  Standing tandem stance R/L - x5 ea 10\"H  Narrow DAVI eyes closed - x5 10\"H  Seated trunk flexion stretch 3 way - x5 10\"H    EDUCATION:  HEP    Goals: (By week 6)  1) Decrease LBP with all activities to <2/10    2) Increase trunk flexion AROM to >60 degrees for improving bending activities               "

## 2024-12-16 ENCOUNTER — OFFICE VISIT (OUTPATIENT)
Dept: WOUND CARE | Facility: CLINIC | Age: 64
End: 2024-12-16
Payer: COMMERCIAL

## 2024-12-16 PROCEDURE — 11042 DBRDMT SUBQ TIS 1ST 20SQCM/<: CPT

## 2024-12-16 PROCEDURE — 11045 DBRDMT SUBQ TISS EACH ADDL: CPT

## 2024-12-26 DIAGNOSIS — I50.32 CHRONIC HEART FAILURE WITH PRESERVED EJECTION FRACTION: ICD-10-CM

## 2024-12-26 DIAGNOSIS — I25.10 CORONARY ARTERY DISEASE INVOLVING NATIVE CORONARY ARTERY OF NATIVE HEART WITHOUT ANGINA PECTORIS: ICD-10-CM

## 2024-12-26 DIAGNOSIS — R40.0 DAYTIME SOMNOLENCE: ICD-10-CM

## 2024-12-26 DIAGNOSIS — R53.82 CHRONIC FATIGUE: ICD-10-CM

## 2024-12-26 DIAGNOSIS — R06.02 SOBOE (SHORTNESS OF BREATH ON EXERTION): ICD-10-CM

## 2024-12-26 DIAGNOSIS — R07.89 OTHER CHEST PAIN: ICD-10-CM

## 2024-12-26 DIAGNOSIS — I10 PRIMARY HYPERTENSION: ICD-10-CM

## 2024-12-26 RX ORDER — FUROSEMIDE 40 MG/1
40 TABLET ORAL DAILY
Qty: 90 TABLET | Refills: 1 | Status: SHIPPED | OUTPATIENT
Start: 2024-12-26 | End: 2025-12-26

## 2025-01-12 DIAGNOSIS — R06.02 SOBOE (SHORTNESS OF BREATH ON EXERTION): ICD-10-CM

## 2025-01-12 DIAGNOSIS — I10 PRIMARY HYPERTENSION: ICD-10-CM

## 2025-01-12 DIAGNOSIS — I25.10 CORONARY ARTERY DISEASE INVOLVING NATIVE CORONARY ARTERY OF NATIVE HEART WITHOUT ANGINA PECTORIS: ICD-10-CM

## 2025-01-12 DIAGNOSIS — R40.0 DAYTIME SOMNOLENCE: ICD-10-CM

## 2025-01-12 DIAGNOSIS — I50.32 CHRONIC HEART FAILURE WITH PRESERVED EJECTION FRACTION: ICD-10-CM

## 2025-01-12 DIAGNOSIS — R53.82 CHRONIC FATIGUE: ICD-10-CM

## 2025-01-12 DIAGNOSIS — R07.89 OTHER CHEST PAIN: ICD-10-CM

## 2025-01-13 ENCOUNTER — OFFICE VISIT (OUTPATIENT)
Dept: WOUND CARE | Facility: CLINIC | Age: 65
End: 2025-01-13
Payer: COMMERCIAL

## 2025-01-13 PROCEDURE — 11045 DBRDMT SUBQ TISS EACH ADDL: CPT

## 2025-01-13 PROCEDURE — 11042 DBRDMT SUBQ TIS 1ST 20SQCM/<: CPT

## 2025-01-15 RX ORDER — SPIRONOLACTONE 25 MG/1
25 TABLET ORAL DAILY
Qty: 180 TABLET | Refills: 1 | Status: SHIPPED | OUTPATIENT
Start: 2025-01-15

## 2025-01-21 NOTE — PROGRESS NOTES
[facility]    Patient Name: Hugo Gauthier  Patient : 1960      Scribe Attestation  By signing my name below, I, Dawit Doss   attest that this documentation has been prepared under the direction and in the presence of Gorge Medeiros DO.      Reason for visit:  This is a 64-year-old male here for follow-up regarding HFpEF, minimal nonobstructive CAD as seen on cardiac catheterization done in 2023, sleep apnea/chronic fatigue, hypertension, chronic venous stasis dermatitis, and morbid obesity.       HPI:  This is a 64-year-old male here for follow-up regarding HFpEF, minimal nonobstructive CAD as seen on cardiac catheterization done in 2023, sleep apnea/chronic fatigue, hypertension, chronic venous stasis dermatitis, and morbid obesity.  The patient was last evaluated by me in 2024.  At that visit I ordered blood work including CMP/lipid/magnesium/BNP, ordered additional blood work including BMP/BNP/magnesium be drawn in 6 months, and asked the patient to follow-up in 6 months and sooner if necessary.  CMP done in 2024 showed normal serum sodium and potassium with a serum creatinine of 0.86, normal ALT/AST, serum magnesium was 2.05, BNP was 44, hemoglobin A1c was 5.9%, TSH was 0.86, CBC showed hemoglobin of 12.9.  Lipid panel done 2024 showed an LDL cholesterol 54 and triglycerides of 236 not currently on any lipid-lowering medical therapy. ECG done today showed sinus rhythm with a heart rate of 68 bpm. The patient reports that he has been feeling generally well from the cardiac standpoint. He denies any new chest pain, shortness of breath, palpitations and lightheadedness. He reports having discomfort/visible veins and discoloration on his lower extremities. He states that he takes all of his medications as prescribed. During my exam, he was resting comfortably on the exam table.            Assessment/Plan:   1. HFpEF  The  patient has a history of HFpEF.  Echocardiogram done in September 2023 showed normal left ventricular systolic function with an ejection fraction of 60%, grade 1 diastolic dysfunction, and difficult imaging with poorly visualized anatomic structures.    He does not appear significantly volume overloaded on exam today except for trace to 1+ pitting bilateral lower extremity edema.  He should continue his current cardiac medications.  Recent lab works as noted in the HPI.  Lab works will be done today and again in 6 months prior to the next visit.   I discussed with him the importance of following a low-sodium heart healthy diet as well as weight loss.   Follow up in 6 months and sooner if necessary.      2. Coronary artery disease  The patient had a history of minimal nonobstructive coronary artery disease as seen on cardiac catheterization done in October 2023.  ECG done today showed sinus rhythm with a heart rate of 68 bpm.    He denies anginal chest discomfort or shortness of breath on exertion.  Blood pressure appears controlled on exam today.  He should continue his current antihypertensive medications and antiplatelet therapy.  Echocardiogram done in September 2023 showed normal left ventricular systolic function with an ejection fraction of 60%, grade 1 diastolic dysfunction, and difficult imaging with poorly visualized anatomic structures.    Recent lab works as noted in the HPI.  Lipid panel done 7/29/2024 showed an LDL cholesterol 54 and triglycerides of 236 not currently on any lipid-lowering medical therapy.  Lab works will be done today and again in 6 months prior to the next visit.   Please see lifestyle recommendations below.  Follow up in 6 months and sooner if necessary.      3. Sleep apnea/Chronic fatigue  The patient has a history of obstructive sleep apnea.   The previously reported chronic fatigue had remained stable since the last visit.   He should continue to follow with Neurology.     4.  Hypertension  The patient has a history of hypertension which appears controlled on exam today.  He should continue his current antihypertensive medications and monitor his blood pressure at home.      5. Chronic venous stasis dermatitis/Peripheral arterial disease  The patient has a history of chronic venous stasis dermatitis.  He underwent a bilateral lower venogram with left iliac vein stent on 11/20/2023.   Warfarin dosing and INR monitoring is being managed by his PCP.  I will refer him to Vascular Surgery.    6. Morbid obesity  Please see lifestyle recommendations below.             Orders:   Refer to Vascular Surgery  BMP/BNP/magnesium   CMP/lipid/magnesium/BNP in 6 months,   Follow-up in 6 months.    Lifestyle Recommendations  I recommend a whole-food plant-based diet, an eating pattern that encourages the consumption of unrefined plant foods (such as fruits, vegetables, tubers, whole grains, legumes, nuts and seeds) and discourages meats, dairy products, eggs and processed foods.     The AHA/ACC recommends that the patient consume a dietary pattern that emphasizes intake of vegetables, fruits, and whole grains; includes low-fat dairy products, poultry, fish, legumes, non-tropical vegetable oils, and nuts; and limits intake of sodium, sweets, sugar-sweetened beverages, and red meats.  Adapt this dietary pattern to appropriate calorie requirements (a 500-750 kcal/day deficit to loose weight), personal and cultural food preferences, and nutrition therapy for other medical conditions (including diabetes).  Achieve this pattern by following plans such as the [place] Mediterranean, DASH dietary pattern, or AHA diet.     Engage in 2 hours and 30 minutes per week of moderate-intensity physical activity, or 1 hour and 15 minutes (75 minutes) per week of vigorous-intensity aerobic physical activity, or an equivalent combination of moderate and vigorous-intensity aerobic physical activity. Aerobic activity should be  performed in episodes of at least 10 minutes preferably spread throughout the week.     Adhering to a heart healthy diet, regular exercise habits, avoidance of tobacco products, and maintenance of a healthy weight are crucial components of their heart disease risk reduction.     Any positive review of systems not specifically addressed in the office visit today should be evaluated and treated by the patients primary care physician or in an emergency department if necessary     Patient was notified that results from ordered tests will be called to the patient if it changes current management; it will otherwise be discussed at a future appointment and available on  [facility] [place].     Thank you for allowing me to participate in the care of this patient.        This document was generated using the assistance of voice recognition software. If there are any errors of spelling, grammar, syntax, or meaning; please feel free to contact me directly for clarification.    Past Medical History:  He has a past medical history of Arrhythmia, Clotting disorder (Multi), Coronary artery disease involving native coronary artery of native heart (10/20/2023), Disease of thyroid gland, Hypertension, Personal history of diseases of the blood and blood-forming organs and certain disorders involving the immune mechanism (10/19/2021), and Personal history of diseases of the blood and blood-forming organs and certain disorders involving the immune mechanism (10/19/2021).    He has no past medical history of AAA (abdominal aortic aneurysm) (CMS-HCC), Asthma, Cancer (Multi), Carotid artery occlusion, CHF (congestive heart failure), Chronic kidney disease, Heart murmur, Hyperlipidemia, Stroke (Multi), or Syncope.    Past Surgical History:  He has a past surgical history that includes Cardiac catheterization (N/A, 10/13/2023).      Social History:  He reports that he has quit smoking. His smoking use included cigarettes. He has a 20 pack-year  "smoking history. He has never been exposed to tobacco smoke. He has never used smokeless tobacco. He reports that he does not currently use alcohol. He reports that he does not currently use drugs.    Family History:  No family history on file.     Allergies:  Patient has no known allergies.    Outpatient Medications:  Current Outpatient Medications   Medication Instructions    alpha tocopherol (Vitamin E) 268 mg (400 unit) capsule oral    ascorbic acid (Vitamin C) 1,000 mg tablet 1 tablet, oral, Daily    BD Luer-Juliette Syringe 3 mL 25 gauge x 1\" syringe 1 mL, miscellaneous, Every 14 days, Using syringe. 2 per month.    cholecalciferol (Vitamin D-3) 50 MCG (2000 UT) tablet 1 tablet, oral, Daily    dilTIAZem CD (Cardizem CD) 120 mg 24 hr capsule TAKE 1 CAPSULE(120 MG) BY MOUTH EVERY DAY    folic acid (Folvite) 800 mcg tablet 1 tablet, oral, Daily    furosemide (LASIX) 40 mg, oral, Daily    iodine, bulk, crystals USE AS DIRECTED.    ketoconazole (NIZOral) 2 % cream Topical, 2 times daily    lisinopril 20 mg, oral, Daily    magnesium oxide (Mag-Ox) 400 mg tablet 1 tablet, oral, Daily    nystatin (Mycostatin) cream APPLY TOPICALLY TO AFFECTED AREA TWICE DAILY FOR 7 DAYS    SantyL 250 unit/gram ointment     selenium 200 mcg tablet 1 tablet, oral, Daily    sildenafil (VIAGRA) 100 mg, oral, Daily PRN    silver sulfADIAZINE (Silvadene) 1 % cream APPLY AND RUB IN A THIN FILM TO AFFECTED AREAS TWICE DAILY.(AM AND PM).    spironolactone (ALDACTONE) 25 mg, oral, Daily    testosterone cypionate (DEPO-TESTOSTERONE) 200 mg, intramuscular, Every 14 days, Inject into the shoulder, thigh, or buttocks    tiZANidine (ZANAFLEX) 2 mg, oral, Nightly    warfarin (Coumadin) 4 mg tablet 4 mg orally daily    warfarin (COUMADIN) 1 mg, oral    zinc gluconate 100 mg, oral, Daily        ROS:  A 14 point review of systems was done and is negative other than as stated in HPI    Vitals:      1/30/2024     9:02 AM 2/2/2024     1:02 PM 3/18/2024     8:07 " "AM 3/29/2024     4:22 PM 7/15/2024    10:53 AM 8/5/2024     7:49 AM 8/12/2024     9:14 AM   Vitals   Systolic 134 159 127 108 180 118 154   Diastolic 74 85 70 63 92 78 89   Heart Rate 98 85 86 93 66 73 72   Temp    36.5 °C (97.7 °F)      Resp 18   24  16    Height 1.778 m (5' 10\")    1.778 m (5' 10\") 1.778 m (5' 10\")    Weight (lb) 360 355.6 355  331 327 323.3   BMI 51.65 kg/m2 51.02 kg/m2 50.94 kg/m2  47.49 kg/m2 46.92 kg/m2 46.39 kg/m2   BSA (m2) 2.84 m2 2.82 m2 2.82 m2  2.72 m2 2.7 m2 2.69 m2        Physical Exam:   Constitutional: Cooperative, in no acute distress, alert, appears stated age.   Skin: Skin color, texture, turgor normal. No rashes or lesions.   Head: Normocephalic. No masses, lesions, tenderness or abnormalities   Eyes: Extraocular movements are grossly intact.   Mouth and throat: Mucous membranes moist   Neck: Neck supple, no carotid bruits, no JVD   Respiratory: Lungs clear to auscultation, no wheezing or rhonchi, no use of accessory muscles   Chest wall: No scars, normal excursion with respiration   Cardiovascular: Regular rhythm without murmur  Gastrointestinal: Abdomen soft, nontender. Bowel sounds normal. Morbidly obese.  Musculoskeletal: Strength equal in upper extremities   Extremities: Trace to 1+ pitting edema  Neurologic: Sensation grossly intact, alert and oriented ×3     Intake/Output:   No intake/output data recorded.    Outpatient Medications  Current Outpatient Medications on File Prior to Visit   Medication Sig Dispense Refill    alpha tocopherol (Vitamin E) 268 mg (400 unit) capsule Take by mouth.      ascorbic acid (Vitamin C) 1,000 mg tablet Take 1 tablet (1,000 mg) by mouth once daily.      BD Luer-Juliette Syringe 3 mL 25 gauge x 1\" syringe 1 mL every 14 (fourteen) days. Using syringe. 2 per month. 2 each 0    cholecalciferol (Vitamin D-3) 50 MCG (2000 UT) tablet Take 1 tablet (2,000 Units) by mouth once daily.      dilTIAZem CD (Cardizem CD) 120 mg 24 hr capsule TAKE 1 CAPSULE(120 " MG) BY MOUTH EVERY DAY 90 capsule 3    folic acid (Folvite) 800 mcg tablet Take 1 tablet (800 mcg) by mouth once daily.      furosemide (Lasix) 40 mg tablet Take 1 tablet (40 mg) by mouth once daily. 90 tablet 1    iodine, bulk, crystals USE AS DIRECTED.      ketoconazole (NIZOral) 2 % cream APPLY SPARINGLY TO AFFECTED AREA(S) TWICE DAILY 60 g 0    lisinopril 20 mg tablet Take 1 tablet (20 mg) by mouth once daily. 90 tablet 3    magnesium oxide (Mag-Ox) 400 mg tablet Take 1 tablet (400 mg) by mouth once daily.      nystatin (Mycostatin) cream APPLY TOPICALLY TO AFFECTED AREA TWICE DAILY FOR 7 DAYS 60 g 0    SantyL 250 unit/gram ointment       selenium 200 mcg tablet Take 1 tablet (200,000 mcg) by mouth once daily.      sildenafil (Viagra) 100 mg tablet TAKE 1 TABLET BY MOUTH ONCE DAILY AS NEEDED 30 tablet 0    silver sulfADIAZINE (Silvadene) 1 % cream APPLY AND RUB IN A THIN FILM TO AFFECTED AREAS TWICE DAILY.(AM AND PM). 85 g 0    spironolactone (Aldactone) 25 mg tablet TAKE 1 TABLET BY MOUTH ONCE DAILY 180 tablet 1    testosterone cypionate (Depo-Testosterone) 200 mg/mL injection Inject 1 mL (200 mg) into the shoulder, thigh, or buttocks every 14 (fourteen) days. 2 mL 5    tiZANidine (Zanaflex) 2 mg tablet TAKE ONE TABLET BY MOUTH AT BEDTIME 30 tablet 0    warfarin (Coumadin) 1 mg tablet TAKE 1 TABLET BY MOUTH EVERY DAY 30 tablet 0    warfarin (Coumadin) 4 mg tablet 4 mg orally daily (Patient taking differently: 4.5 mg once daily. 4 mg orally daily) 30 tablet 11    zinc gluconate 100 mg tablet Take 1 tablet (100 mg) by mouth once daily.  0    [DISCONTINUED] spironolactone (Aldactone) 25 mg tablet Take 1 tablet (25 mg) by mouth once daily. 180 tablet 1     No current facility-administered medications on file prior to visit.       Labs: (past 26 weeks)  Recent Results (from the past 26 weeks)   Comprehensive Metabolic Panel    Collection Time: 07/29/24 10:19 AM   Result Value Ref Range    Glucose 93 74 - 99 mg/dL     Sodium 138 136 - 145 mmol/L    Potassium 4.7 3.5 - 5.3 mmol/L    Chloride 104 98 - 107 mmol/L    Bicarbonate 27 21 - 32 mmol/L    Anion Gap 12 10 - 20 mmol/L    Urea Nitrogen 14 6 - 23 mg/dL    Creatinine 0.86 0.50 - 1.30 mg/dL    eGFR >90 >60 mL/min/1.73m*2    Calcium 9.6 8.6 - 10.3 mg/dL    Albumin 4.1 3.4 - 5.0 g/dL    Alkaline Phosphatase 56 33 - 136 U/L    Total Protein 7.2 6.4 - 8.2 g/dL    AST 20 9 - 39 U/L    Bilirubin, Total 0.5 0.0 - 1.2 mg/dL    ALT 26 10 - 52 U/L   Lipid Panel    Collection Time: 07/29/24 10:19 AM   Result Value Ref Range    Cholesterol 154 0 - 199 mg/dL    HDL-Cholesterol 52.7 mg/dL    Cholesterol/HDL Ratio 2.9     LDL Calculated 54 <=99 mg/dL    VLDL 47 (H) 0 - 40 mg/dL    Triglycerides 236 (H) 0 - 149 mg/dL    Non HDL Cholesterol 101 0 - 149 mg/dL   Magnesium    Collection Time: 07/29/24 10:19 AM   Result Value Ref Range    Magnesium 2.05 1.60 - 2.40 mg/dL   B-Type Natriuretic Peptide    Collection Time: 07/29/24 10:19 AM   Result Value Ref Range    BNP 44 0 - 99 pg/mL   Hemoglobin A1C    Collection Time: 07/29/24 10:19 AM   Result Value Ref Range    Hemoglobin A1C 5.9 (H) see below %    Estimated Average Glucose 123 Not Established mg/dL   CBC and Auto Differential    Collection Time: 07/29/24 10:19 AM   Result Value Ref Range    WBC 6.7 4.4 - 11.3 x10*3/uL    nRBC 0.0 0.0 - 0.0 /100 WBCs    RBC 4.77 4.50 - 5.90 x10*6/uL    Hemoglobin 12.9 (L) 13.5 - 17.5 g/dL    Hematocrit 44.2 41.0 - 52.0 %    MCV 93 80 - 100 fL    MCH 27.0 26.0 - 34.0 pg    MCHC 29.2 (L) 32.0 - 36.0 g/dL    RDW 17.2 (H) 11.5 - 14.5 %    Platelets 245 150 - 450 x10*3/uL    Neutrophils % 56.1 40.0 - 80.0 %    Immature Granulocytes %, Automated 0.2 0.0 - 0.9 %    Lymphocytes % 30.1 13.0 - 44.0 %    Monocytes % 9.8 2.0 - 10.0 %    Eosinophils % 2.7 0.0 - 6.0 %    Basophils % 1.1 0.0 - 2.0 %    Neutrophils Absolute 3.74 1.20 - 7.70 x10*3/uL    Immature Granulocytes Absolute, Automated 0.01 0.00 - 0.70 x10*3/uL     Lymphocytes Absolute 2.00 1.20 - 4.80 x10*3/uL    Monocytes Absolute 0.65 0.10 - 1.00 x10*3/uL    Eosinophils Absolute 0.18 0.00 - 0.70 x10*3/uL    Basophils Absolute 0.07 0.00 - 0.10 x10*3/uL   TSH with reflex to Free T4 if abnormal    Collection Time: 07/29/24 10:19 AM   Result Value Ref Range    Thyroid Stimulating Hormone 0.86 0.44 - 3.98 mIU/L   Prostate Specific Antigen    Collection Time: 07/29/24 10:19 AM   Result Value Ref Range    Prostate Specific AG 0.22 <=4.00 ng/mL   Testosterone    Collection Time: 07/29/24 10:19 AM   Result Value Ref Range    Testosterone 78 (L) 240 - 1,000 ng/dL       ECG  No results found. However, due to the size of the patient record, not all encounters were searched. Please check Results Review for a complete set of results.    Echocardiogram  No results found for this or any previous visit from the past 1095 days.      CV Studies:  EKG:No results found for this or any previous visit (from the past 4464 hours).  Echocardiogram:   [place] [address]  Phone [phone] Fax [phone]    TRANSTHORACIC ECHOCARDIOGRAM REPORT      Patient Name:     POLINA Stacy Physician:   [id number] Latricia Moseley MD  Study Date:       9/25/2023     Referring Physician: FLOR VALLADARES  MRN/PID:          [id number]      PCP:  Accession/Order#: [id number]  Department Location: [facility]  YOB: 1960     Fellow:  Gender:           M             Nurse:               Malu Freeman RN  Admit Date:                     Sonographer:         Ronda Mae SALIMA  Admission Status: Outpatient    Additional Staff:  Height:           177.80 cm     CC Report to:  Weight:           169.19 kg     Study Type:          Echocardiogram  BSA:              2.72 m2  Blood Pressure: 170 /84 mmHg    Diagnosis/ICD: I10-Essential (primary) hypertension; R06.02-Shortness of breath  Indication:    Dyspnea on  Exertion  Procedure/CPT: Echo Complete w Full Doppler-[id number]    Patient History:  Pertinent History: HTN.    Study Detail: The following Echo studies were performed: 2D, M-Mode, Doppler and  color flow. Technically challenging study due to body habitus and  prominent lung artifact. Optison used as a contrast agent for  endocardial border definition. Total contrast used for this  procedure was 3 mL via IV push.      PHYSICIAN INTERPRETATION:  Left Ventricle: Left ventricular systolic function is normal, with an estimated ejection fraction of 60-65%. There are no regional wall motion abnormalities. The left ventricular cavity size is normal. Spectral Doppler shows an impaired relaxation pattern of left ventricular diastolic filling.  Left Atrium: The left atrium is normal in size.  Right Ventricle: The right ventricle is normal in size. There is normal right ventricular global systolic function.  Right Atrium: The right atrium is normal in size.  Aortic Valve: The aortic valve was not well visualized. There is no evidence of aortic valve regurgitation. The peak instantaneous gradient of the aortic valve is 12.7 mmHg. The mean gradient of the aortic valve is 6.9 mmHg.  Mitral Valve: The mitral valve is normal in structure. There is no evidence of mitral valve regurgitation.  Tricuspid Valve: The tricuspid valve is structurally normal. No evidence of tricuspid regurgitation.  Pulmonic Valve: The pulmonic valve is structurally normal. There is no indication of pulmonic valve regurgitation.  Pericardium: There is no pericardial effusion noted.  Aorta: The aortic root is normal.      CONCLUSIONS:  1. Left ventricular systolic function is normal with a 60-65% estimated ejection fraction.  2. Poorly visualized anatomical structures due to suboptimal image quality.  3. Spectral Doppler shows an impaired relaxation pattern of left ventricular diastolic filling.    QUANTITATIVE DATA SUMMARY:  2D MEASUREMENTS:  Normal  Ranges:  LAs:           4.45 cm   (2.7-4.0cm)  IVSd:          0.75 cm   (0.6-1.1cm)  LVPWd:         0.82 cm   (0.6-1.1cm)  LVIDd:         5.83 cm   (3.9-5.9cm)  LVIDs:         4.67 cm  LV Mass Index: 63.6 g/m2  LV % FS        19.9 %    LA VOLUME:  Normal Ranges:  LA Vol A4C:        79.5 ml    (22+/-6mL/m2)  LA Vol A2C:        67.4 ml  LA Vol BP:         77.9 ml  LA Vol Index A4C:  29.2 ml/m2  LA Vol Index A2C:  24.8 ml/m2  LA Vol Index BP:   28.6 ml/m2  LA Area A4C:       25.4 cm2  LA Area A2C:       22.0 cm2  LA Major Axis A4C: 6.9 cm  LA Major Axis A2C: 6.1 cm  LA Volume Index:   28.6 ml/m2  LA Vol A4C:        74.6 ml  LA Vol A2C:        60.4 ml    RA VOLUME BY A/L METHOD:  Normal Ranges:  RA Area A4C: 14.9 cm2    AORTA MEASUREMENTS:  Normal Ranges:  Ao Sinus, d: 3.50 cm (2.1-3.5cm)  Ao STJ, d:   2.70 cm (1.7-3.4cm)  Asc Ao, d:   3.50 cm (2.1-3.4cm)    LV SYSTOLIC FUNCTION BY 2D PLANIMETRY (MOD):  Normal Ranges:  EF-A4C View: 64.4 % (>=55%)  EF-A2C View: 57.6 %  EF-Biplane:  59.7 %    LV DIASTOLIC FUNCTION:  Normal Ranges:  MV Peak E:    0.82 m/s    (0.7-1.2 m/s)  MV Peak A:    1.13 m/s    (0.42-0.7 m/s)  E/A Ratio:    0.73        (1.0-2.2)  MV e'         0.10 m/s    (>8.0)  MV lateral e' 0.11 m/s  MV medial e'  0.10 m/s  MV A Dur:     131.30 msec  E/e' Ratio:   7.85        (<8.0)    MITRAL VALVE:  Normal Ranges:  MV DT: 205 msec (150-240msec)    AORTIC VALVE:  Normal Ranges:  AoV Vmax:                1.78 m/s  (<=1.7m/s)  AoV Peak P.7 mmHg (<20mmHg)  AoV Mean P.9 mmHg  (1.7-11.5mmHg)  LVOT Max Franck:            1.02 m/s  (<=1.1m/s)  AoV VTI:                 35.30 cm  (18-25cm)  LVOT VTI:                21.21 cm  LVOT Diameter:           2.22 cm   (1.8-2.4cm)  AoV Area, VTI:           2.32 cm2  (2.5-5.5cm2)  AoV Area,Vmax:           2.21 cm2  (2.5-4.5cm2)  AoV Dimensionless Index: 0.60      RIGHT VENTRICLE:  RV Basal 3.00 cm  RV Mid   2.40 cm  RV Major 8.4 cm  TAPSE:   22.2 mm  RV s'     0.13 m/s    AORTA:  Asc Ao Diam 3.54 cm      [id number] Latricia Moseley MD  Electronically signed on 9/26/2023 at 11:37:05 AM         Final     Stress Testing CRYSTAL(BPV3452:1:1825): No results found for this or any previous visit from the past 1825 days.    Cardiac Catheterization: No results found for this or any previous visit from the past 1825 days.  No results found for this or any previous visit from the past 3650 days.     Cardiac Scoring:   CT heart calcium scoring wo IV contrast 12/05/2022    Narrative  MRN: [id number]  Patient Name: POLINA MCCORMACK    STUDY:  CT CARDIAC SCORING;  12/5/2022 10:25 am    INDICATION:  cardiac screening  Z13.6: Screening for cardiovascular condition.    COMPARISON:  None.    ACCESSION NUMBER(S):  [id number]    ORDERING CLINICIAN:  ALYSON RAMIREZ    TECHNIQUE:  Using prospective ECG gating, CT scan of the coronary arteries was  performed without intravenous contrast. Coronary calcium scoring  was  performed according to the method of Agatston.    FINDINGS:  The score and distribution of calcium in the coronary arteries is as  follows:    LM 0,  LAD 20.27,  LCx 0,  RCA 0,    Total 20.27    The visualized mid/lower ascending thoracic aorta measures 3.7 cm in  diameter. The heart is normal in size. No pericardial effusion is  present.    No gross evidence of mediastinal or hilar lymphadenopathy or masses  is identified. The visualized segments of the lungs are normally  expanded.    The visualized subdiaphragmatic structures appear intact. Fatty liver.    Impression  1. Coronary artery calcium score of 20.27*.  2. Fatty liver.    *Coronary artery calcium scoring may be helpful in predicting the  risk for future coronary heart disease events.  According to the  American College of Cardiology Foundation Clinical Expert Consensus  Task Force, such testing provides important prognostic information in  patients with more than one coronary heart disease risk factor. The  coronary artery  "calcium score correlates with the annual risk of a  non-fatal myocardial infarction or coronary heart disease death.    Coronary artery score            Annual Risk    0-99                             0.4%  100-399                        1.3%  >400                            2.4%    These three \"breakpoints\" correspond to lower, intermediate and high  risk states for future coronary events.  Such information should be  used, along with appropriate clinical judgment, to make decisions  regarding the intensity of risk factor management strategies to treat  blood lipids and to modify other non-lipid coronary risk factors.    Reference: Verona P et al. Circulation.  2007; 115:402-426    AAA : No results found for this or any previous visit from the past 1825 days.    OTHER: No results found for this or any previous visit from the past 1825 days.    LAST IMAGING RESULTS  ECG 12 Lead  Sinus rhythm heart rate 66 bpm.      Problem List Items Addressed This Visit       Chronic venous stasis dermatitis of both lower extremities    HTN (hypertension)    SOBOE (shortness of breath on exertion)    Chronic fatigue    Daytime somnolence    Morbid obesity (Multi)    Coronary artery disease involving native coronary artery of native heart    (HFpEF) heart failure with preserved ejection fraction - Primary    Other chest pain    RADHA (obstructive sleep apnea)    Overview     9/28/23 Split night PSG ([place], BMI 56.7, ESS 23), all-supine, no optimal pressure up to CPAP 20 cm H20 (all hypopneas), switched to biPAP, centrals emerged (last obs ap at EPAP 14 cm H20--? mixed apnea); no optimal pressure noted. (+) PLMs noted.                   Gorge Medeiros DO, FACC, FACOI  "

## 2025-01-27 ENCOUNTER — HOSPITAL ENCOUNTER (OUTPATIENT)
Dept: RADIOLOGY | Facility: HOSPITAL | Age: 65
Discharge: HOME | End: 2025-01-27
Payer: COMMERCIAL

## 2025-01-27 ENCOUNTER — APPOINTMENT (OUTPATIENT)
Dept: CARDIOLOGY | Facility: CLINIC | Age: 65
End: 2025-01-27
Payer: COMMERCIAL

## 2025-01-27 VITALS
BODY MASS INDEX: 45.1 KG/M2 | WEIGHT: 315 LBS | HEART RATE: 68 BPM | SYSTOLIC BLOOD PRESSURE: 138 MMHG | DIASTOLIC BLOOD PRESSURE: 78 MMHG | HEIGHT: 70 IN

## 2025-01-27 DIAGNOSIS — M25.511 ACUTE PAIN OF RIGHT SHOULDER: Primary | ICD-10-CM

## 2025-01-27 DIAGNOSIS — I87.2 CHRONIC VENOUS STASIS DERMATITIS OF BOTH LOWER EXTREMITIES: ICD-10-CM

## 2025-01-27 DIAGNOSIS — M25.511 ACUTE PAIN OF RIGHT SHOULDER: ICD-10-CM

## 2025-01-27 DIAGNOSIS — I50.32 CHRONIC HEART FAILURE WITH PRESERVED EJECTION FRACTION: Primary | ICD-10-CM

## 2025-01-27 DIAGNOSIS — I25.10 CORONARY ARTERY DISEASE INVOLVING NATIVE CORONARY ARTERY OF NATIVE HEART WITHOUT ANGINA PECTORIS: ICD-10-CM

## 2025-01-27 DIAGNOSIS — R40.0 DAYTIME SOMNOLENCE: ICD-10-CM

## 2025-01-27 DIAGNOSIS — R06.02 SOBOE (SHORTNESS OF BREATH ON EXERTION): ICD-10-CM

## 2025-01-27 DIAGNOSIS — G47.33 OSA (OBSTRUCTIVE SLEEP APNEA): ICD-10-CM

## 2025-01-27 DIAGNOSIS — E66.01 MORBID OBESITY (MULTI): ICD-10-CM

## 2025-01-27 DIAGNOSIS — R07.89 OTHER CHEST PAIN: ICD-10-CM

## 2025-01-27 DIAGNOSIS — R53.82 CHRONIC FATIGUE: ICD-10-CM

## 2025-01-27 DIAGNOSIS — I10 PRIMARY HYPERTENSION: ICD-10-CM

## 2025-01-27 LAB
ATRIAL RATE: 68 BPM
P AXIS: 63 DEGREES
P OFFSET: 193 MS
P ONSET: 128 MS
PR INTERVAL: 188 MS
Q ONSET: 222 MS
QRS COUNT: 11 BEATS
QRS DURATION: 94 MS
QT INTERVAL: 360 MS
QTC CALCULATION(BAZETT): 382 MS
QTC FREDERICIA: 375 MS
R AXIS: 28 DEGREES
T AXIS: 34 DEGREES
T OFFSET: 402 MS
VENTRICULAR RATE: 68 BPM

## 2025-01-27 PROCEDURE — 93010 ELECTROCARDIOGRAM REPORT: CPT | Performed by: INTERNAL MEDICINE

## 2025-01-27 PROCEDURE — 73030 X-RAY EXAM OF SHOULDER: CPT | Mod: RIGHT SIDE | Performed by: RADIOLOGY

## 2025-01-27 PROCEDURE — 1036F TOBACCO NON-USER: CPT | Performed by: INTERNAL MEDICINE

## 2025-01-27 PROCEDURE — 99214 OFFICE O/P EST MOD 30 MIN: CPT | Mod: 25 | Performed by: INTERNAL MEDICINE

## 2025-01-27 PROCEDURE — 93005 ELECTROCARDIOGRAM TRACING: CPT | Performed by: INTERNAL MEDICINE

## 2025-01-27 PROCEDURE — 3075F SYST BP GE 130 - 139MM HG: CPT | Performed by: INTERNAL MEDICINE

## 2025-01-27 PROCEDURE — 4010F ACE/ARB THERAPY RXD/TAKEN: CPT | Performed by: INTERNAL MEDICINE

## 2025-01-27 PROCEDURE — 73030 X-RAY EXAM OF SHOULDER: CPT | Mod: RT

## 2025-01-27 PROCEDURE — 99214 OFFICE O/P EST MOD 30 MIN: CPT | Performed by: INTERNAL MEDICINE

## 2025-01-27 PROCEDURE — 3008F BODY MASS INDEX DOCD: CPT | Performed by: INTERNAL MEDICINE

## 2025-01-27 PROCEDURE — 3078F DIAST BP <80 MM HG: CPT | Performed by: INTERNAL MEDICINE

## 2025-01-28 LAB
ANION GAP SERPL CALCULATED.4IONS-SCNC: 10 MMOL/L (CALC) (ref 7–17)
BNP SERPL-MCNC: 22 PG/ML
BUN SERPL-MCNC: 20 MG/DL (ref 7–25)
BUN/CREAT SERPL: NORMAL (CALC) (ref 6–22)
CALCIUM SERPL-MCNC: 9.9 MG/DL (ref 8.6–10.3)
CHLORIDE SERPL-SCNC: 104 MMOL/L (ref 98–110)
CO2 SERPL-SCNC: 26 MMOL/L (ref 20–32)
CREAT SERPL-MCNC: 0.86 MG/DL (ref 0.7–1.35)
EGFRCR SERPLBLD CKD-EPI 2021: 98 ML/MIN/1.73M2
GLUCOSE SERPL-MCNC: 91 MG/DL (ref 65–99)
INR PPP: 1.2
MAGNESIUM SERPL-MCNC: 2.2 MG/DL (ref 1.5–2.5)
POTASSIUM SERPL-SCNC: 4.6 MMOL/L (ref 3.5–5.3)
PROTHROMBIN TIME: 12.4 SEC (ref 9–11.5)
SODIUM SERPL-SCNC: 140 MMOL/L (ref 135–146)

## 2025-01-31 NOTE — RESULT ENCOUNTER NOTE
Hugo, x-ray of your shoulder showed just mild arthritis.  No other abnormalities were noted.  Please let me know if you have any questions

## 2025-02-10 ENCOUNTER — APPOINTMENT (OUTPATIENT)
Dept: PRIMARY CARE | Facility: CLINIC | Age: 65
End: 2025-02-10
Payer: COMMERCIAL

## 2025-02-10 ENCOUNTER — OFFICE VISIT (OUTPATIENT)
Dept: WOUND CARE | Facility: CLINIC | Age: 65
End: 2025-02-10
Payer: COMMERCIAL

## 2025-02-10 VITALS
HEART RATE: 86 BPM | SYSTOLIC BLOOD PRESSURE: 122 MMHG | OXYGEN SATURATION: 98 % | BODY MASS INDEX: 45.1 KG/M2 | WEIGHT: 315 LBS | HEIGHT: 70 IN | RESPIRATION RATE: 16 BRPM | DIASTOLIC BLOOD PRESSURE: 72 MMHG

## 2025-02-10 DIAGNOSIS — Z86.2 HISTORY OF IRON DEFICIENCY ANEMIA: ICD-10-CM

## 2025-02-10 DIAGNOSIS — I48.0 PAROXYSMAL ATRIAL FIBRILLATION (MULTI): ICD-10-CM

## 2025-02-10 DIAGNOSIS — M54.50 CHRONIC BILATERAL LOW BACK PAIN WITHOUT SCIATICA: ICD-10-CM

## 2025-02-10 DIAGNOSIS — I50.32 CHRONIC HEART FAILURE WITH PRESERVED EJECTION FRACTION: ICD-10-CM

## 2025-02-10 DIAGNOSIS — R35.1 NOCTURIA: ICD-10-CM

## 2025-02-10 DIAGNOSIS — E29.1 HYPOGONADISM MALE: ICD-10-CM

## 2025-02-10 DIAGNOSIS — S49.91XD ACROMIOCLAVICULAR JOINT INJURY, RIGHT, SUBSEQUENT ENCOUNTER: ICD-10-CM

## 2025-02-10 DIAGNOSIS — E66.01 CLASS 3 SEVERE OBESITY DUE TO EXCESS CALORIES WITH SERIOUS COMORBIDITY AND BODY MASS INDEX (BMI) OF 45.0 TO 49.9 IN ADULT: ICD-10-CM

## 2025-02-10 DIAGNOSIS — I87.2 CHRONIC VENOUS STASIS DERMATITIS OF BOTH LOWER EXTREMITIES: ICD-10-CM

## 2025-02-10 DIAGNOSIS — G89.29 CHRONIC BILATERAL LOW BACK PAIN WITHOUT SCIATICA: ICD-10-CM

## 2025-02-10 DIAGNOSIS — I87.1 MAY-THURNER SYNDROME: ICD-10-CM

## 2025-02-10 DIAGNOSIS — I87.2 VENOUS STASIS DERMATITIS OF BOTH LOWER EXTREMITIES: ICD-10-CM

## 2025-02-10 DIAGNOSIS — L30.9 DERMATITIS: Primary | ICD-10-CM

## 2025-02-10 DIAGNOSIS — E66.813 CLASS 3 SEVERE OBESITY DUE TO EXCESS CALORIES WITH SERIOUS COMORBIDITY AND BODY MASS INDEX (BMI) OF 45.0 TO 49.9 IN ADULT: ICD-10-CM

## 2025-02-10 DIAGNOSIS — E11.9 DIET-CONTROLLED DIABETES MELLITUS (MULTI): ICD-10-CM

## 2025-02-10 PROBLEM — L23.7 CONTACT DERMATITIS DUE TO POISON IVY: Status: ACTIVE | Noted: 2025-02-10

## 2025-02-10 PROBLEM — F41.9 ANXIETY: Status: ACTIVE | Noted: 2025-02-10

## 2025-02-10 PROBLEM — D23.4 BENIGN NEOPLASM OF SCALP: Status: ACTIVE | Noted: 2025-02-10

## 2025-02-10 PROBLEM — I82.90 CLOT: Status: ACTIVE | Noted: 2023-09-25

## 2025-02-10 PROBLEM — T14.8XXA LOCAL INFECTION OF WOUND: Status: ACTIVE | Noted: 2025-02-10

## 2025-02-10 PROBLEM — N39.0 ACUTE URINARY TRACT INFECTION: Status: ACTIVE | Noted: 2022-10-24

## 2025-02-10 PROBLEM — R53.83 FATIGUE: Status: ACTIVE | Noted: 2023-09-25

## 2025-02-10 PROBLEM — D50.9 IRON DEFICIENCY ANEMIA: Status: ACTIVE | Noted: 2025-02-10

## 2025-02-10 PROBLEM — R35.0 INCREASED FREQUENCY OF URINATION: Status: ACTIVE | Noted: 2025-02-10

## 2025-02-10 PROBLEM — S61.409A OPEN WOUND OF HAND: Status: ACTIVE | Noted: 2025-02-10

## 2025-02-10 PROBLEM — R06.09 DYSPNEA ON EXERTION: Status: ACTIVE | Noted: 2025-02-10

## 2025-02-10 PROBLEM — L08.9 LOCAL INFECTION OF WOUND: Status: ACTIVE | Noted: 2025-02-10

## 2025-02-10 PROBLEM — L03.119 CELLULITIS OF LOWER EXTREMITY: Status: ACTIVE | Noted: 2023-01-17

## 2025-02-10 PROBLEM — I10 HYPERTENSION: Status: ACTIVE | Noted: 2023-09-25

## 2025-02-10 PROBLEM — D64.9 ANEMIA: Status: ACTIVE | Noted: 2025-02-10

## 2025-02-10 PROBLEM — L98.499 SKIN ULCER (MULTI): Status: ACTIVE | Noted: 2023-01-17

## 2025-02-10 LAB
POC INR: 1.7 (ref 0.9–1.1)
POC PROTHROMBIN TIME: 20.4 (ref 9.3–12.5)

## 2025-02-10 PROCEDURE — 3074F SYST BP LT 130 MM HG: CPT | Performed by: FAMILY MEDICINE

## 2025-02-10 PROCEDURE — 11042 DBRDMT SUBQ TIS 1ST 20SQCM/<: CPT

## 2025-02-10 PROCEDURE — 99214 OFFICE O/P EST MOD 30 MIN: CPT | Performed by: FAMILY MEDICINE

## 2025-02-10 PROCEDURE — 11045 DBRDMT SUBQ TISS EACH ADDL: CPT

## 2025-02-10 PROCEDURE — 3008F BODY MASS INDEX DOCD: CPT | Performed by: FAMILY MEDICINE

## 2025-02-10 PROCEDURE — 3078F DIAST BP <80 MM HG: CPT | Performed by: FAMILY MEDICINE

## 2025-02-10 PROCEDURE — 1036F TOBACCO NON-USER: CPT | Performed by: FAMILY MEDICINE

## 2025-02-10 PROCEDURE — 85610 PROTHROMBIN TIME: CPT | Performed by: FAMILY MEDICINE

## 2025-02-10 PROCEDURE — 4010F ACE/ARB THERAPY RXD/TAKEN: CPT | Performed by: FAMILY MEDICINE

## 2025-02-10 RX ORDER — TRIAMCINOLONE ACETONIDE 1 MG/G
CREAM TOPICAL 2 TIMES DAILY
Qty: 80 G | Refills: 1 | Status: SHIPPED | OUTPATIENT
Start: 2025-02-10

## 2025-02-10 RX ORDER — SILVER SULFADIAZINE 10 G/1000G
1 CREAM TOPICAL 2 TIMES DAILY
Qty: 85 G | Refills: 1 | Status: SHIPPED | OUTPATIENT
Start: 2025-02-10

## 2025-02-10 ASSESSMENT — ANXIETY QUESTIONNAIRES
6. BECOMING EASILY ANNOYED OR IRRITABLE: NOT AT ALL
GAD7 TOTAL SCORE: 0
5. BEING SO RESTLESS THAT IT IS HARD TO SIT STILL: NOT AT ALL
7. FEELING AFRAID AS IF SOMETHING AWFUL MIGHT HAPPEN: NOT AT ALL
1. FEELING NERVOUS, ANXIOUS, OR ON EDGE: NOT AT ALL
4. TROUBLE RELAXING: NOT AT ALL
3. WORRYING TOO MUCH ABOUT DIFFERENT THINGS: NOT AT ALL
2. NOT BEING ABLE TO STOP OR CONTROL WORRYING: NOT AT ALL
IF YOU CHECKED OFF ANY PROBLEMS ON THIS QUESTIONNAIRE, HOW DIFFICULT HAVE THESE PROBLEMS MADE IT FOR YOU TO DO YOUR WORK, TAKE CARE OF THINGS AT HOME, OR GET ALONG WITH OTHER PEOPLE: NOT DIFFICULT AT ALL

## 2025-02-10 ASSESSMENT — ENCOUNTER SYMPTOMS
OCCASIONAL FEELINGS OF UNSTEADINESS: 0
LOSS OF SENSATION IN FEET: 0
WOUND: 1
DEPRESSION: 0

## 2025-02-10 ASSESSMENT — PATIENT HEALTH QUESTIONNAIRE - PHQ9
SUM OF ALL RESPONSES TO PHQ9 QUESTIONS 1 AND 2: 0
2. FEELING DOWN, DEPRESSED OR HOPELESS: NOT AT ALL
1. LITTLE INTEREST OR PLEASURE IN DOING THINGS: NOT AT ALL

## 2025-02-10 NOTE — PROGRESS NOTES
Subjective   Patient ID: Hugo Gauthier is a 64 y.o. male.    HPI  Hugo is here for followup , he struggles with venous stasis dermatitis, and had both lower legs done, right is difficult, and left has better circulation, and wound is much smaller, and improving. He also had allergic reaction to something and he has horrible rash on hands, He in the past had creams to help with this, triamcinolone and silvadene, weight was 360 and now has decreased to 320. This is fantastic.   Review of Systems   Cardiovascular:  Positive for leg swelling.   Skin:  Positive for wound.   All other systems reviewed and are negative.      Objective   Physical Exam  Vitals reviewed.   Constitutional:       Appearance: Normal appearance.   HENT:      Head: Normocephalic and atraumatic.      Right Ear: Tympanic membrane normal.      Left Ear: Tympanic membrane normal.      Nose: Nose normal.      Mouth/Throat:      Mouth: Mucous membranes are moist.      Pharynx: Oropharynx is clear.   Eyes:      Extraocular Movements: Extraocular movements intact.      Conjunctiva/sclera: Conjunctivae normal.      Pupils: Pupils are equal, round, and reactive to light.   Cardiovascular:      Rate and Rhythm: Normal rate and regular rhythm.      Pulses: Normal pulses.      Heart sounds: Normal heart sounds.   Pulmonary:      Effort: Pulmonary effort is normal.      Breath sounds: Normal breath sounds.   Abdominal:      General: Abdomen is flat. Bowel sounds are normal.      Palpations: Abdomen is soft.   Musculoskeletal:         General: Normal range of motion.      Cervical back: Normal range of motion and neck supple.      Right lower leg: Edema present.      Left lower leg: Edema present.   Skin:     General: Skin is warm and dry.      Capillary Refill: Capillary refill takes less than 2 seconds.      Findings: Erythema, lesion and rash present.      Comments: Rash on bilateral hands,    Neurological:      General: No focal deficit present.      Mental  Status: He is alert and oriented to person, place, and time.   Psychiatric:         Mood and Affect: Mood normal.         Behavior: Behavior normal.         Assessment/Plan   Diagnoses and all orders for this visit:  Dermatitis-resent creams he has been putting on his hands,.   -     triamcinolone (Kenalog) 0.1 % cream; Apply topically 2 times a day. Apply to affected area 1-2 times daily as needed.  -     silver sulfADIAZINE (Silvadene) 1 % cream; Apply 1 Application topically 2 times a day.  -     Hemoglobin A1C; Future  Paroxysmal atrial fibrillation (Multi)Stable and controlled  INR 1.9 today-he will self adjust food and meds.  -     Follow Up In [facility] - PCP - Established  -     POCT INR manually resulted  -     Follow Up In [facility] - PCP - Health Maintenance; Future  -     Protime-INR; Standing  Diet-controlled diabetes mellitus (Multi)- A1C has dropped with weight loss.   -     CBC and Auto Differential; Future  -     Comprehensive Metabolic Panel; Future  Venous stasis dermatitis of both lower extremities- will see Dr. Camejo next for evaluation.   -     silver sulfADIAZINE (Silvadene) 1 % cream; Apply 1 Application topically 2 times a day.  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     Class 3 severe obesity due to excess calories with serious comorbidity and body mass index (BMI) of 45.0 to 49.9 in adult  History of iron deficiency anemia-recheck   Chronic bilateral low back pain without sciatica-Stable and controlled    Acromioclavicular joint injury, right, subsequent encounter- this seems to be bursitis, as your x-ray looked good. May need therapy or injections.   Venous Stasis ulcers-follows monthly with wound care.   Hypogonadism male-currently holding off on injections.   -     TSH with reflex to Free T4 if abnormal; Future  -     Testosterone; Future  Nocturia  -     Prostate  Specific Antigen; Future

## 2025-02-10 NOTE — PATIENT INSTRUCTIONS
Your acromioclavicular joint has an inflamed bursa over the top of it. You can Ice it and do range of motion, it's possible that you can get an injection for this as well.   It was good to see you.   I can add bloodwork to you Dr. Medeiros blood work in July.

## 2025-03-07 DIAGNOSIS — I48.0 PAROXYSMAL ATRIAL FIBRILLATION (MULTI): ICD-10-CM

## 2025-03-10 ENCOUNTER — OFFICE VISIT (OUTPATIENT)
Dept: WOUND CARE | Facility: CLINIC | Age: 65
End: 2025-03-10
Payer: COMMERCIAL

## 2025-03-10 PROCEDURE — 11045 DBRDMT SUBQ TISS EACH ADDL: CPT

## 2025-03-10 PROCEDURE — 11042 DBRDMT SUBQ TIS 1ST 20SQCM/<: CPT

## 2025-03-17 DIAGNOSIS — I48.11 LONGSTANDING PERSISTENT ATRIAL FIBRILLATION (MULTI): ICD-10-CM

## 2025-03-17 RX ORDER — WARFARIN 4 MG/1
TABLET ORAL
Qty: 30 TABLET | Refills: 11 | Status: SHIPPED | OUTPATIENT
Start: 2025-03-17

## 2025-03-24 ENCOUNTER — APPOINTMENT (OUTPATIENT)
Dept: VASCULAR SURGERY | Facility: HOSPITAL | Age: 65
End: 2025-03-24
Payer: COMMERCIAL

## 2025-03-25 ENCOUNTER — APPOINTMENT (OUTPATIENT)
Dept: VASCULAR SURGERY | Facility: CLINIC | Age: 65
End: 2025-03-25
Payer: COMMERCIAL

## 2025-03-25 VITALS
HEIGHT: 70 IN | BODY MASS INDEX: 45.1 KG/M2 | SYSTOLIC BLOOD PRESSURE: 132 MMHG | HEART RATE: 92 BPM | DIASTOLIC BLOOD PRESSURE: 72 MMHG | WEIGHT: 315 LBS

## 2025-03-25 DIAGNOSIS — I73.9 PVD (PERIPHERAL VASCULAR DISEASE) (CMS-HCC): ICD-10-CM

## 2025-03-25 DIAGNOSIS — I10 PRIMARY HYPERTENSION: ICD-10-CM

## 2025-03-25 DIAGNOSIS — I87.2 CHRONIC VENOUS STASIS DERMATITIS OF BOTH LOWER EXTREMITIES: ICD-10-CM

## 2025-03-25 DIAGNOSIS — R07.89 OTHER CHEST PAIN: ICD-10-CM

## 2025-03-25 DIAGNOSIS — R06.02 SOBOE (SHORTNESS OF BREATH ON EXERTION): ICD-10-CM

## 2025-03-25 DIAGNOSIS — I87.2 VENOUS INSUFFICIENCY: Primary | ICD-10-CM

## 2025-03-25 DIAGNOSIS — I50.32 CHRONIC HEART FAILURE WITH PRESERVED EJECTION FRACTION: ICD-10-CM

## 2025-03-25 DIAGNOSIS — G47.33 OSA (OBSTRUCTIVE SLEEP APNEA): ICD-10-CM

## 2025-03-25 DIAGNOSIS — R40.0 DAYTIME SOMNOLENCE: ICD-10-CM

## 2025-03-25 DIAGNOSIS — I25.10 CORONARY ARTERY DISEASE INVOLVING NATIVE CORONARY ARTERY OF NATIVE HEART WITHOUT ANGINA PECTORIS: ICD-10-CM

## 2025-03-25 DIAGNOSIS — R53.82 CHRONIC FATIGUE: ICD-10-CM

## 2025-03-25 DIAGNOSIS — E66.01 MORBID OBESITY (MULTI): ICD-10-CM

## 2025-03-25 PROCEDURE — 4010F ACE/ARB THERAPY RXD/TAKEN: CPT | Performed by: SURGERY

## 2025-03-25 PROCEDURE — 99214 OFFICE O/P EST MOD 30 MIN: CPT | Performed by: SURGERY

## 2025-03-25 PROCEDURE — 3075F SYST BP GE 130 - 139MM HG: CPT | Performed by: SURGERY

## 2025-03-25 PROCEDURE — 1036F TOBACCO NON-USER: CPT | Performed by: SURGERY

## 2025-03-25 PROCEDURE — 3008F BODY MASS INDEX DOCD: CPT | Performed by: SURGERY

## 2025-03-25 PROCEDURE — 3078F DIAST BP <80 MM HG: CPT | Performed by: SURGERY

## 2025-03-25 ASSESSMENT — ENCOUNTER SYMPTOMS
FEVER: 0
SLEEP DISTURBANCE: 0
WHEEZING: 0
SORE THROAT: 0
FACIAL ASYMMETRY: 0
PALPITATIONS: 0
CONSTIPATION: 0
DIARRHEA: 0
NAUSEA: 0
JOINT SWELLING: 0
CONFUSION: 0
WEAKNESS: 0
HEADACHES: 0
SEIZURES: 0
COLOR CHANGE: 0
VOMITING: 0
NECK PAIN: 0
SPEECH DIFFICULTY: 0
COUGH: 0
TROUBLE SWALLOWING: 0
FATIGUE: 0
NUMBNESS: 0
ABDOMINAL PAIN: 0
LIGHT-HEADEDNESS: 0
SHORTNESS OF BREATH: 0
DIFFICULTY URINATING: 0
DIZZINESS: 0
CHILLS: 0
WOUND: 1
ARTHRALGIAS: 0
ADENOPATHY: 0

## 2025-03-25 NOTE — PROGRESS NOTES
Subjective   Patient ID: Hugo Gauthier is a 64 y.o. male who presents for No chief complaint on file..  HPI  64 year old male presents for annual follow up for venous insufficiency with lle wounds s/p venogram left iliac vein stenting 11/2023 with Dr Camejo. Patient reports LLE edema overall improving, wound almost completley healed, continues to follow with wound care. They are happy with progress of wound. Patient has history of bilateral GSV ablation with Dr Jamil Roughly 3-4 years ago. Patient now complaining of right thigh VV associated with heaviness, achiness and leg fatigue. Ambulates with cane no calf claudication some right thigh cramping, mostly at night. No wounds RLE.     3/4/24 IVC/iliac duplex  CONCLUSIONS:  Aorta/Common Iliac Arteries/IVC: The inferior vena cava appears patent with no evidence of thrombosis. The left common iliac vein stent appears patent. Technically difficult study due to body habitus and heavy overlying bowel gas.    11/20/2023 bilateral venogram, IVUS, left iliac vein stent (Dr Camejo)    ~2021 bilateral GSV ablation (Dr Jamil?)  Review of Systems   Constitutional:  Negative for chills, fatigue and fever.   HENT:  Negative for congestion, sore throat and trouble swallowing.    Eyes:  Negative for visual disturbance.   Respiratory:  Negative for cough, shortness of breath and wheezing.    Cardiovascular:  Positive for leg swelling. Negative for chest pain and palpitations.        Rle vv,    Gastrointestinal:  Negative for abdominal pain, constipation, diarrhea, nausea and vomiting.   Endocrine: Negative for cold intolerance and heat intolerance.   Genitourinary:  Negative for difficulty urinating.   Musculoskeletal:  Negative for arthralgias, joint swelling and neck pain.   Skin:  Positive for wound. Negative for color change.   Neurological:  Negative for dizziness, seizures, syncope, facial asymmetry, speech difficulty, weakness, light-headedness, numbness and headaches.  "  Hematological:  Negative for adenopathy.   Psychiatric/Behavioral:  Negative for behavioral problems, confusion and sleep disturbance.      Vitals:    03/25/25 0816   BP: 132/72   Pulse: 92   Weight: 149 kg (329 lb)   Height: 1.778 m (5' 10\")      Objective   Physical Exam  Constitutional:       Appearance: Normal appearance.   HENT:      Head: Normocephalic.      Right Ear: External ear normal.      Left Ear: External ear normal.      Nose: Nose normal.      Mouth/Throat:      Mouth: Mucous membranes are moist.   Eyes:      Extraocular Movements: Extraocular movements intact.   Neck:      Vascular: No carotid bruit.   Cardiovascular:      Rate and Rhythm: Normal rate and regular rhythm.      Pulses: Normal pulses.      Comments: Chronic venous stasis changes lower extremities. Lle venous stasis wound  Pulmonary:      Effort: Pulmonary effort is normal. No respiratory distress.      Breath sounds: Normal breath sounds.   Abdominal:      Palpations: Abdomen is soft.      Tenderness: There is no abdominal tenderness.   Musculoskeletal:         General: No swelling or tenderness.      Cervical back: Normal range of motion and neck supple.      Right lower leg: Edema present.      Left lower leg: Edema present.   Skin:     General: Skin is warm and dry.      Capillary Refill: Capillary refill takes less than 2 seconds.      Findings: No lesion.   Neurological:      General: No focal deficit present.      Mental Status: He is alert and oriented to person, place, and time. Mental status is at baseline.   Psychiatric:         Mood and Affect: Mood normal.         Behavior: Behavior normal.         Thought Content: Thought content normal.         Judgment: Judgment normal.         Assessment/Plan   Problem List Items Addressed This Visit             ICD-10-CM    Chronic venous stasis dermatitis of both lower extremities I87.2    HTN (hypertension) I10    SOBOE (shortness of breath on exertion) R06.02    Chronic fatigue " R53.82    Daytime somnolence R40.0    Morbid obesity (Multi) E66.01    Coronary artery disease involving native coronary artery of native heart I25.10    (HFpEF) heart failure with preserved ejection fraction I50.30    Other chest pain R07.89    RADHA (obstructive sleep apnea) G47.33     Other Visit Diagnoses         Codes    Venous insufficiency    -  Primary I87.2    Relevant Orders    Vascular US Aorta Iliac Duplex Complete    Vascular US lower extremity venous insufficiency right    PVD (peripheral vascular disease) (CMS-[facility])     I73.9    Relevant Orders    Vascular US PVR Without Exercise        64 year old male presents for annual follow up for venous insufficiency with lle wounds s/p venogram left iliac vein stenting 11/2023 with Dr Camejo. Patient now complaining of right thigh VV associated with heaviness, achiness and leg fatigue. Right thigh claudication  -IVC/iliac duplex for left iliac vein stent patency check  -RLE VI  -PVR/CHRISTIANO  -continue to follow with wound care  -compression and elevation  -encourage ambulation as tolerated  -follow up after testing  Call with new or worsening symptoms, questions or concerns  Dr Camejo present and in agreement          Tory Garcia PA-C 03/25/25 8:35 AM

## 2025-03-28 ENCOUNTER — HOSPITAL ENCOUNTER (EMERGENCY)
Facility: HOSPITAL | Age: 65
Discharge: ANOTHER HOSPITAL | End: 2025-03-29
Attending: STUDENT IN AN ORGANIZED HEALTH CARE EDUCATION/TRAINING PROGRAM
Payer: COMMERCIAL

## 2025-03-28 ENCOUNTER — TELEPHONE (OUTPATIENT)
Dept: PRIMARY CARE | Facility: CLINIC | Age: 65
End: 2025-03-28
Payer: COMMERCIAL

## 2025-03-28 ENCOUNTER — APPOINTMENT (OUTPATIENT)
Dept: CARDIOLOGY | Facility: HOSPITAL | Age: 65
End: 2025-03-28
Payer: COMMERCIAL

## 2025-03-28 ENCOUNTER — APPOINTMENT (OUTPATIENT)
Dept: RADIOLOGY | Facility: HOSPITAL | Age: 65
End: 2025-03-28
Payer: COMMERCIAL

## 2025-03-28 DIAGNOSIS — N13.9 OBSTRUCTIVE UROPATHY: Primary | ICD-10-CM

## 2025-03-28 DIAGNOSIS — N12 PYELONEPHRITIS: ICD-10-CM

## 2025-03-28 LAB
ALBUMIN SERPL BCP-MCNC: 3.1 G/DL (ref 3.4–5)
ALP SERPL-CCNC: 65 U/L (ref 33–136)
ALT SERPL W P-5'-P-CCNC: 24 U/L (ref 10–52)
ANION GAP SERPL CALC-SCNC: 13 MMOL/L (ref 10–20)
AST SERPL W P-5'-P-CCNC: 22 U/L (ref 9–39)
BASOPHILS # BLD AUTO: 0.02 X10*3/UL (ref 0–0.1)
BASOPHILS NFR BLD AUTO: 0.2 %
BILIRUB SERPL-MCNC: 1.1 MG/DL (ref 0–1.2)
BUN SERPL-MCNC: 22 MG/DL (ref 6–23)
CALCIUM SERPL-MCNC: 8.9 MG/DL (ref 8.6–10.3)
CARDIAC TROPONIN I PNL SERPL HS: 10 NG/L (ref 0–20)
CHLORIDE SERPL-SCNC: 98 MMOL/L (ref 98–107)
CO2 SERPL-SCNC: 23 MMOL/L (ref 21–32)
CREAT SERPL-MCNC: 1.36 MG/DL (ref 0.5–1.3)
EGFRCR SERPLBLD CKD-EPI 2021: 59 ML/MIN/1.73M*2
EOSINOPHIL # BLD AUTO: 0.01 X10*3/UL (ref 0–0.7)
EOSINOPHIL NFR BLD AUTO: 0.1 %
ERYTHROCYTE [DISTWIDTH] IN BLOOD BY AUTOMATED COUNT: 17.1 % (ref 11.5–14.5)
FLUAV RNA RESP QL NAA+PROBE: NOT DETECTED
FLUBV RNA RESP QL NAA+PROBE: NOT DETECTED
GLUCOSE SERPL-MCNC: 121 MG/DL (ref 74–99)
HCT VFR BLD AUTO: 35.3 % (ref 41–52)
HGB BLD-MCNC: 11.2 G/DL (ref 13.5–17.5)
IMM GRANULOCYTES # BLD AUTO: 0.07 X10*3/UL (ref 0–0.7)
IMM GRANULOCYTES NFR BLD AUTO: 0.5 % (ref 0–0.9)
INR PPP: 1.5 (ref 0.9–1.1)
LACTATE SERPL-SCNC: 0.8 MMOL/L (ref 0.4–2)
LIPASE SERPL-CCNC: 47 U/L (ref 9–82)
LYMPHOCYTES # BLD AUTO: 0.91 X10*3/UL (ref 1.2–4.8)
LYMPHOCYTES NFR BLD AUTO: 7 %
MAGNESIUM SERPL-MCNC: 2.01 MG/DL (ref 1.6–2.4)
MCH RBC QN AUTO: 27.1 PG (ref 26–34)
MCHC RBC AUTO-ENTMCNC: 31.7 G/DL (ref 32–36)
MCV RBC AUTO: 86 FL (ref 80–100)
MONOCYTES # BLD AUTO: 1.54 X10*3/UL (ref 0.1–1)
MONOCYTES NFR BLD AUTO: 11.9 %
NEUTROPHILS # BLD AUTO: 10.38 X10*3/UL (ref 1.2–7.7)
NEUTROPHILS NFR BLD AUTO: 80.3 %
NRBC BLD-RTO: 0 /100 WBCS (ref 0–0)
PLATELET # BLD AUTO: 186 X10*3/UL (ref 150–450)
POTASSIUM SERPL-SCNC: 3.9 MMOL/L (ref 3.5–5.3)
PROT SERPL-MCNC: 6.8 G/DL (ref 6.4–8.2)
PROTHROMBIN TIME: 16.6 SECONDS (ref 9.8–12.4)
RBC # BLD AUTO: 4.13 X10*6/UL (ref 4.5–5.9)
SARS-COV-2 RNA RESP QL NAA+PROBE: NOT DETECTED
SODIUM SERPL-SCNC: 130 MMOL/L (ref 136–145)
WBC # BLD AUTO: 12.9 X10*3/UL (ref 4.4–11.3)

## 2025-03-28 PROCEDURE — 87086 URINE CULTURE/COLONY COUNT: CPT | Mod: PORLAB | Performed by: STUDENT IN AN ORGANIZED HEALTH CARE EDUCATION/TRAINING PROGRAM

## 2025-03-28 PROCEDURE — 2500000004 HC RX 250 GENERAL PHARMACY W/ HCPCS (ALT 636 FOR OP/ED): Performed by: STUDENT IN AN ORGANIZED HEALTH CARE EDUCATION/TRAINING PROGRAM

## 2025-03-28 PROCEDURE — 84484 ASSAY OF TROPONIN QUANT: CPT | Performed by: STUDENT IN AN ORGANIZED HEALTH CARE EDUCATION/TRAINING PROGRAM

## 2025-03-28 PROCEDURE — 2550000001 HC RX 255 CONTRASTS: Performed by: STUDENT IN AN ORGANIZED HEALTH CARE EDUCATION/TRAINING PROGRAM

## 2025-03-28 PROCEDURE — 85025 COMPLETE CBC W/AUTO DIFF WBC: CPT | Performed by: STUDENT IN AN ORGANIZED HEALTH CARE EDUCATION/TRAINING PROGRAM

## 2025-03-28 PROCEDURE — 74177 CT ABD & PELVIS W/CONTRAST: CPT

## 2025-03-28 PROCEDURE — 87636 SARSCOV2 & INF A&B AMP PRB: CPT | Performed by: STUDENT IN AN ORGANIZED HEALTH CARE EDUCATION/TRAINING PROGRAM

## 2025-03-28 PROCEDURE — 74177 CT ABD & PELVIS W/CONTRAST: CPT | Performed by: RADIOLOGY

## 2025-03-28 PROCEDURE — 83690 ASSAY OF LIPASE: CPT | Performed by: STUDENT IN AN ORGANIZED HEALTH CARE EDUCATION/TRAINING PROGRAM

## 2025-03-28 PROCEDURE — 83735 ASSAY OF MAGNESIUM: CPT | Performed by: STUDENT IN AN ORGANIZED HEALTH CARE EDUCATION/TRAINING PROGRAM

## 2025-03-28 PROCEDURE — 81001 URINALYSIS AUTO W/SCOPE: CPT | Performed by: STUDENT IN AN ORGANIZED HEALTH CARE EDUCATION/TRAINING PROGRAM

## 2025-03-28 PROCEDURE — 99285 EMERGENCY DEPT VISIT HI MDM: CPT | Mod: 25 | Performed by: STUDENT IN AN ORGANIZED HEALTH CARE EDUCATION/TRAINING PROGRAM

## 2025-03-28 PROCEDURE — 83605 ASSAY OF LACTIC ACID: CPT | Performed by: STUDENT IN AN ORGANIZED HEALTH CARE EDUCATION/TRAINING PROGRAM

## 2025-03-28 PROCEDURE — 84075 ASSAY ALKALINE PHOSPHATASE: CPT | Performed by: STUDENT IN AN ORGANIZED HEALTH CARE EDUCATION/TRAINING PROGRAM

## 2025-03-28 PROCEDURE — 93005 ELECTROCARDIOGRAM TRACING: CPT

## 2025-03-28 PROCEDURE — 96361 HYDRATE IV INFUSION ADD-ON: CPT

## 2025-03-28 PROCEDURE — 85610 PROTHROMBIN TIME: CPT | Performed by: STUDENT IN AN ORGANIZED HEALTH CARE EDUCATION/TRAINING PROGRAM

## 2025-03-28 PROCEDURE — 36415 COLL VENOUS BLD VENIPUNCTURE: CPT | Performed by: STUDENT IN AN ORGANIZED HEALTH CARE EDUCATION/TRAINING PROGRAM

## 2025-03-28 RX ADMIN — IOHEXOL 75 ML: 350 INJECTION, SOLUTION INTRAVENOUS at 22:58

## 2025-03-28 RX ADMIN — SODIUM CHLORIDE 500 ML: 0.9 INJECTION, SOLUTION INTRAVENOUS at 21:51

## 2025-03-28 ASSESSMENT — LIFESTYLE VARIABLES
HAVE YOU EVER FELT YOU SHOULD CUT DOWN ON YOUR DRINKING: NO
EVER FELT BAD OR GUILTY ABOUT YOUR DRINKING: NO
EVER HAD A DRINK FIRST THING IN THE MORNING TO STEADY YOUR NERVES TO GET RID OF A HANGOVER: NO
HAVE PEOPLE ANNOYED YOU BY CRITICIZING YOUR DRINKING: NO
TOTAL SCORE: 0

## 2025-03-28 ASSESSMENT — PAIN SCALES - GENERAL: PAINLEVEL_OUTOF10: 0 - NO PAIN

## 2025-03-28 ASSESSMENT — PAIN - FUNCTIONAL ASSESSMENT: PAIN_FUNCTIONAL_ASSESSMENT: 0-10

## 2025-03-28 ASSESSMENT — PAIN DESCRIPTION - LOCATION: LOCATION: ABDOMEN

## 2025-03-28 NOTE — TELEPHONE ENCOUNTER
Patient has been sick all week, now he can't walk, very weak, no energy, shakes, shivers, no appetite, can't keep anything down. No fever, no throwing up, has not worked all week  Please advise  [facility] in [place]

## 2025-03-29 ENCOUNTER — ANESTHESIA (OUTPATIENT)
Dept: OPERATING ROOM | Facility: HOSPITAL | Age: 65
DRG: 660 | End: 2025-03-29
Payer: COMMERCIAL

## 2025-03-29 ENCOUNTER — APPOINTMENT (OUTPATIENT)
Dept: RADIOLOGY | Facility: HOSPITAL | Age: 65
DRG: 660 | End: 2025-03-29
Payer: COMMERCIAL

## 2025-03-29 ENCOUNTER — ANESTHESIA EVENT (OUTPATIENT)
Dept: OPERATING ROOM | Facility: HOSPITAL | Age: 65
DRG: 660 | End: 2025-03-29
Payer: COMMERCIAL

## 2025-03-29 ENCOUNTER — PREP FOR PROCEDURE (OUTPATIENT)
Dept: UROLOGY | Facility: HOSPITAL | Age: 65
End: 2025-03-29
Payer: COMMERCIAL

## 2025-03-29 ENCOUNTER — HOSPITAL ENCOUNTER (INPATIENT)
Facility: HOSPITAL | Age: 65
DRG: 660 | End: 2025-03-29
Attending: INTERNAL MEDICINE | Admitting: INTERNAL MEDICINE
Payer: COMMERCIAL

## 2025-03-29 VITALS
WEIGHT: 312 LBS | SYSTOLIC BLOOD PRESSURE: 136 MMHG | HEART RATE: 88 BPM | RESPIRATION RATE: 18 BRPM | OXYGEN SATURATION: 95 % | TEMPERATURE: 98.8 F | DIASTOLIC BLOOD PRESSURE: 68 MMHG | HEIGHT: 70 IN | BODY MASS INDEX: 44.67 KG/M2

## 2025-03-29 DIAGNOSIS — K59.03 DRUG INDUCED CONSTIPATION: ICD-10-CM

## 2025-03-29 DIAGNOSIS — N12 PYELONEPHRITIS: Primary | ICD-10-CM

## 2025-03-29 DIAGNOSIS — N20.0 RIGHT KIDNEY STONE: ICD-10-CM

## 2025-03-29 DIAGNOSIS — R26.9 GAIT DISTURBANCE: ICD-10-CM

## 2025-03-29 DIAGNOSIS — N20.0 RIGHT KIDNEY STONE: Primary | ICD-10-CM

## 2025-03-29 LAB
ALBUMIN SERPL BCP-MCNC: 3.5 G/DL (ref 3.4–5)
AMORPH CRY #/AREA UR COMP ASSIST: ABNORMAL /HPF
ANION GAP SERPL CALC-SCNC: 13 MMOL/L (ref 10–20)
APPEARANCE UR: CLEAR
BACTERIA #/AREA URNS AUTO: ABNORMAL /HPF
BASOPHILS # BLD AUTO: 0.03 X10*3/UL (ref 0–0.1)
BASOPHILS NFR BLD AUTO: 0.2 %
BILIRUB UR STRIP.AUTO-MCNC: NEGATIVE MG/DL
BUN SERPL-MCNC: 21 MG/DL (ref 6–23)
CALCIUM SERPL-MCNC: 9.1 MG/DL (ref 8.6–10.3)
CHLORIDE SERPL-SCNC: 97 MMOL/L (ref 98–107)
CO2 SERPL-SCNC: 25 MMOL/L (ref 21–32)
COLOR UR: ABNORMAL
CREAT SERPL-MCNC: 1.34 MG/DL (ref 0.5–1.3)
CRP SERPL-MCNC: 24.09 MG/DL
EGFRCR SERPLBLD CKD-EPI 2021: 60 ML/MIN/1.73M*2
EOSINOPHIL # BLD AUTO: 0.1 X10*3/UL (ref 0–0.7)
EOSINOPHIL NFR BLD AUTO: 0.7 %
ERYTHROCYTE [DISTWIDTH] IN BLOOD BY AUTOMATED COUNT: 17.2 % (ref 11.5–14.5)
GLUCOSE BLD MANUAL STRIP-MCNC: 108 MG/DL (ref 74–99)
GLUCOSE SERPL-MCNC: 104 MG/DL (ref 74–99)
GLUCOSE UR STRIP.AUTO-MCNC: NORMAL MG/DL
HCT VFR BLD AUTO: 36.4 % (ref 41–52)
HGB BLD-MCNC: 11.6 G/DL (ref 13.5–17.5)
HOLD SPECIMEN: NORMAL
IMM GRANULOCYTES # BLD AUTO: 0.13 X10*3/UL (ref 0–0.7)
IMM GRANULOCYTES NFR BLD AUTO: 1 % (ref 0–0.9)
INR PPP: 1.4 (ref 0.9–1.1)
KETONES UR STRIP.AUTO-MCNC: NEGATIVE MG/DL
LACTATE SERPL-SCNC: 0.8 MMOL/L (ref 0.4–2)
LEUKOCYTE ESTERASE UR QL STRIP.AUTO: ABNORMAL
LYMPHOCYTES # BLD AUTO: 1.36 X10*3/UL (ref 1.2–4.8)
LYMPHOCYTES NFR BLD AUTO: 10.1 %
MAGNESIUM SERPL-MCNC: 2.11 MG/DL (ref 1.6–2.4)
MCH RBC QN AUTO: 28.3 PG (ref 26–34)
MCHC RBC AUTO-ENTMCNC: 31.9 G/DL (ref 32–36)
MCV RBC AUTO: 89 FL (ref 80–100)
MONOCYTES # BLD AUTO: 1.67 X10*3/UL (ref 0.1–1)
MONOCYTES NFR BLD AUTO: 12.4 %
MUCOUS THREADS #/AREA URNS AUTO: ABNORMAL /LPF
NEUTROPHILS # BLD AUTO: 10.13 X10*3/UL (ref 1.2–7.7)
NEUTROPHILS NFR BLD AUTO: 75.6 %
NITRITE UR QL STRIP.AUTO: ABNORMAL
NRBC BLD-RTO: 0 /100 WBCS (ref 0–0)
PH UR STRIP.AUTO: 8 [PH]
PHOSPHATE SERPL-MCNC: 2.9 MG/DL (ref 2.5–4.9)
PLATELET # BLD AUTO: 195 X10*3/UL (ref 150–450)
POTASSIUM SERPL-SCNC: 4.1 MMOL/L (ref 3.5–5.3)
PROT UR STRIP.AUTO-MCNC: ABNORMAL MG/DL
PROTHROMBIN TIME: 15.7 SECONDS (ref 9.8–12.4)
RBC # BLD AUTO: 4.1 X10*6/UL (ref 4.5–5.9)
RBC # UR STRIP.AUTO: ABNORMAL MG/DL
RBC #/AREA URNS AUTO: ABNORMAL /HPF
SODIUM SERPL-SCNC: 131 MMOL/L (ref 136–145)
SP GR UR STRIP.AUTO: 1.01
SQUAMOUS #/AREA URNS AUTO: ABNORMAL /HPF
UROBILINOGEN UR STRIP.AUTO-MCNC: NORMAL MG/DL
WBC # BLD AUTO: 13.4 X10*3/UL (ref 4.4–11.3)
WBC #/AREA URNS AUTO: ABNORMAL /HPF

## 2025-03-29 PROCEDURE — 7100000002 HC RECOVERY ROOM TIME - EACH INCREMENTAL 1 MINUTE: Performed by: STUDENT IN AN ORGANIZED HEALTH CARE EDUCATION/TRAINING PROGRAM

## 2025-03-29 PROCEDURE — 85025 COMPLETE CBC W/AUTO DIFF WBC: CPT | Performed by: INTERNAL MEDICINE

## 2025-03-29 PROCEDURE — 2500000004 HC RX 250 GENERAL PHARMACY W/ HCPCS (ALT 636 FOR OP/ED): Performed by: ANESTHESIOLOGY

## 2025-03-29 PROCEDURE — 9420000001 HC RT PATIENT EDUCATION 5 MIN

## 2025-03-29 PROCEDURE — 2500000002 HC RX 250 W HCPCS SELF ADMINISTERED DRUGS (ALT 637 FOR MEDICARE OP, ALT 636 FOR OP/ED): Performed by: ANESTHESIOLOGY

## 2025-03-29 PROCEDURE — 84145 PROCALCITONIN (PCT): CPT | Mod: GEALAB | Performed by: STUDENT IN AN ORGANIZED HEALTH CARE EDUCATION/TRAINING PROGRAM

## 2025-03-29 PROCEDURE — 99223 1ST HOSP IP/OBS HIGH 75: CPT | Performed by: INTERNAL MEDICINE

## 2025-03-29 PROCEDURE — 82947 ASSAY GLUCOSE BLOOD QUANT: CPT

## 2025-03-29 PROCEDURE — 0T768DZ DILATION OF RIGHT URETER WITH INTRALUMINAL DEVICE, VIA NATURAL OR ARTIFICIAL OPENING ENDOSCOPIC: ICD-10-PCS | Performed by: STUDENT IN AN ORGANIZED HEALTH CARE EDUCATION/TRAINING PROGRAM

## 2025-03-29 PROCEDURE — 76000 FLUOROSCOPY <1 HR PHYS/QHP: CPT

## 2025-03-29 PROCEDURE — 2720000007 HC OR 272 NO HCPCS: Performed by: STUDENT IN AN ORGANIZED HEALTH CARE EDUCATION/TRAINING PROGRAM

## 2025-03-29 PROCEDURE — 86140 C-REACTIVE PROTEIN: CPT | Performed by: INTERNAL MEDICINE

## 2025-03-29 PROCEDURE — 96375 TX/PRO/DX INJ NEW DRUG ADDON: CPT

## 2025-03-29 PROCEDURE — 87086 URINE CULTURE/COLONY COUNT: CPT | Mod: GEALAB | Performed by: STUDENT IN AN ORGANIZED HEALTH CARE EDUCATION/TRAINING PROGRAM

## 2025-03-29 PROCEDURE — 3600000003 HC OR TIME - INITIAL BASE CHARGE - PROCEDURE LEVEL THREE: Performed by: STUDENT IN AN ORGANIZED HEALTH CARE EDUCATION/TRAINING PROGRAM

## 2025-03-29 PROCEDURE — 85610 PROTHROMBIN TIME: CPT | Performed by: INTERNAL MEDICINE

## 2025-03-29 PROCEDURE — 2500000004 HC RX 250 GENERAL PHARMACY W/ HCPCS (ALT 636 FOR OP/ED): Performed by: INTERNAL MEDICINE

## 2025-03-29 PROCEDURE — 3600000008 HC OR TIME - EACH INCREMENTAL 1 MINUTE - PROCEDURE LEVEL THREE: Performed by: STUDENT IN AN ORGANIZED HEALTH CARE EDUCATION/TRAINING PROGRAM

## 2025-03-29 PROCEDURE — 3700000002 HC GENERAL ANESTHESIA TIME - EACH INCREMENTAL 1 MINUTE: Performed by: STUDENT IN AN ORGANIZED HEALTH CARE EDUCATION/TRAINING PROGRAM

## 2025-03-29 PROCEDURE — 2500000001 HC RX 250 WO HCPCS SELF ADMINISTERED DRUGS (ALT 637 FOR MEDICARE OP): Performed by: STUDENT IN AN ORGANIZED HEALTH CARE EDUCATION/TRAINING PROGRAM

## 2025-03-29 PROCEDURE — 2500000004 HC RX 250 GENERAL PHARMACY W/ HCPCS (ALT 636 FOR OP/ED): Performed by: EMERGENCY MEDICINE

## 2025-03-29 PROCEDURE — 83605 ASSAY OF LACTIC ACID: CPT | Performed by: INTERNAL MEDICINE

## 2025-03-29 PROCEDURE — 2500000004 HC RX 250 GENERAL PHARMACY W/ HCPCS (ALT 636 FOR OP/ED): Performed by: NURSE ANESTHETIST, CERTIFIED REGISTERED

## 2025-03-29 PROCEDURE — 2500000001 HC RX 250 WO HCPCS SELF ADMINISTERED DRUGS (ALT 637 FOR MEDICARE OP): Performed by: INTERNAL MEDICINE

## 2025-03-29 PROCEDURE — 80069 RENAL FUNCTION PANEL: CPT | Performed by: INTERNAL MEDICINE

## 2025-03-29 PROCEDURE — 96365 THER/PROPH/DIAG IV INF INIT: CPT

## 2025-03-29 PROCEDURE — 87075 CULTR BACTERIA EXCEPT BLOOD: CPT | Mod: GEALAB | Performed by: INTERNAL MEDICINE

## 2025-03-29 PROCEDURE — 52332 CYSTOSCOPY AND TREATMENT: CPT | Performed by: STUDENT IN AN ORGANIZED HEALTH CARE EDUCATION/TRAINING PROGRAM

## 2025-03-29 PROCEDURE — C1769 GUIDE WIRE: HCPCS | Performed by: STUDENT IN AN ORGANIZED HEALTH CARE EDUCATION/TRAINING PROGRAM

## 2025-03-29 PROCEDURE — 2500000004 HC RX 250 GENERAL PHARMACY W/ HCPCS (ALT 636 FOR OP/ED): Performed by: STUDENT IN AN ORGANIZED HEALTH CARE EDUCATION/TRAINING PROGRAM

## 2025-03-29 PROCEDURE — 1100000001 HC PRIVATE ROOM DAILY

## 2025-03-29 PROCEDURE — 83735 ASSAY OF MAGNESIUM: CPT | Performed by: INTERNAL MEDICINE

## 2025-03-29 PROCEDURE — 94640 AIRWAY INHALATION TREATMENT: CPT

## 2025-03-29 PROCEDURE — 7100000001 HC RECOVERY ROOM TIME - INITIAL BASE CHARGE: Performed by: STUDENT IN AN ORGANIZED HEALTH CARE EDUCATION/TRAINING PROGRAM

## 2025-03-29 PROCEDURE — C2617 STENT, NON-COR, TEM W/O DEL: HCPCS | Performed by: STUDENT IN AN ORGANIZED HEALTH CARE EDUCATION/TRAINING PROGRAM

## 2025-03-29 PROCEDURE — 99222 1ST HOSP IP/OBS MODERATE 55: CPT | Performed by: STUDENT IN AN ORGANIZED HEALTH CARE EDUCATION/TRAINING PROGRAM

## 2025-03-29 PROCEDURE — 94664 DEMO&/EVAL PT USE INHALER: CPT

## 2025-03-29 PROCEDURE — 36415 COLL VENOUS BLD VENIPUNCTURE: CPT | Performed by: INTERNAL MEDICINE

## 2025-03-29 PROCEDURE — 2780000003 HC OR 278 NO HCPCS: Performed by: STUDENT IN AN ORGANIZED HEALTH CARE EDUCATION/TRAINING PROGRAM

## 2025-03-29 PROCEDURE — 3700000001 HC GENERAL ANESTHESIA TIME - INITIAL BASE CHARGE: Performed by: STUDENT IN AN ORGANIZED HEALTH CARE EDUCATION/TRAINING PROGRAM

## 2025-03-29 DEVICE — STENT KIT, IMAJIN HYDRO, 6FR X 26CM, POSITIONER, NO GUIDEWIRE: Type: IMPLANTABLE DEVICE | Site: URETER | Status: FUNCTIONAL

## 2025-03-29 RX ORDER — DILTIAZEM HYDROCHLORIDE 120 MG/1
120 CAPSULE, COATED, EXTENDED RELEASE ORAL DAILY
Status: DISCONTINUED | OUTPATIENT
Start: 2025-03-29 | End: 2025-03-31 | Stop reason: HOSPADM

## 2025-03-29 RX ORDER — ACETAMINOPHEN 500 MG
5 TABLET ORAL NIGHTLY
Status: DISCONTINUED | OUTPATIENT
Start: 2025-03-29 | End: 2025-03-31 | Stop reason: HOSPADM

## 2025-03-29 RX ORDER — SPIRONOLACTONE 25 MG/1
25 TABLET ORAL DAILY
Status: DISCONTINUED | OUTPATIENT
Start: 2025-03-29 | End: 2025-03-31 | Stop reason: HOSPADM

## 2025-03-29 RX ORDER — MIDAZOLAM HYDROCHLORIDE 1 MG/ML
INJECTION, SOLUTION INTRAMUSCULAR; INTRAVENOUS AS NEEDED
Status: DISCONTINUED | OUTPATIENT
Start: 2025-03-29 | End: 2025-03-29

## 2025-03-29 RX ORDER — LISINOPRIL 20 MG/1
20 TABLET ORAL DAILY
Status: DISCONTINUED | OUTPATIENT
Start: 2025-03-29 | End: 2025-03-31 | Stop reason: HOSPADM

## 2025-03-29 RX ORDER — METHOCARBAMOL 500 MG/1
1000 TABLET, FILM COATED ORAL EVERY 8 HOURS SCHEDULED
Status: DISCONTINUED | OUTPATIENT
Start: 2025-03-29 | End: 2025-03-31 | Stop reason: HOSPADM

## 2025-03-29 RX ORDER — NALOXONE HYDROCHLORIDE 0.4 MG/ML
0.2 INJECTION, SOLUTION INTRAMUSCULAR; INTRAVENOUS; SUBCUTANEOUS EVERY 5 MIN PRN
Status: DISCONTINUED | OUTPATIENT
Start: 2025-03-29 | End: 2025-03-31 | Stop reason: HOSPADM

## 2025-03-29 RX ORDER — ONDANSETRON HYDROCHLORIDE 2 MG/ML
4 INJECTION, SOLUTION INTRAVENOUS EVERY 8 HOURS PRN
Status: DISCONTINUED | OUTPATIENT
Start: 2025-03-29 | End: 2025-03-31 | Stop reason: HOSPADM

## 2025-03-29 RX ORDER — GUAIFENESIN/DEXTROMETHORPHAN 100-10MG/5
5 SYRUP ORAL EVERY 4 HOURS PRN
Status: DISCONTINUED | OUTPATIENT
Start: 2025-03-29 | End: 2025-03-31 | Stop reason: HOSPADM

## 2025-03-29 RX ORDER — MORPHINE SULFATE 2 MG/ML
2 INJECTION, SOLUTION INTRAMUSCULAR; INTRAVENOUS EVERY 4 HOURS PRN
Status: DISCONTINUED | OUTPATIENT
Start: 2025-03-29 | End: 2025-03-31 | Stop reason: HOSPADM

## 2025-03-29 RX ORDER — SUCCINYLCHOLINE CHLORIDE 20 MG/ML
INJECTION INTRAMUSCULAR; INTRAVENOUS AS NEEDED
Status: DISCONTINUED | OUTPATIENT
Start: 2025-03-29 | End: 2025-03-29

## 2025-03-29 RX ORDER — MORPHINE SULFATE 4 MG/ML
4 INJECTION INTRAVENOUS ONCE
Status: COMPLETED | OUTPATIENT
Start: 2025-03-29 | End: 2025-03-29

## 2025-03-29 RX ORDER — HEPARIN SODIUM 5000 [USP'U]/ML
7500 INJECTION, SOLUTION INTRAVENOUS; SUBCUTANEOUS EVERY 8 HOURS SCHEDULED
Status: DISCONTINUED | OUTPATIENT
Start: 2025-03-30 | End: 2025-03-31 | Stop reason: HOSPADM

## 2025-03-29 RX ORDER — CEFTRIAXONE 2 G/50ML
2 INJECTION, SOLUTION INTRAVENOUS EVERY 24 HOURS
Status: DISCONTINUED | OUTPATIENT
Start: 2025-03-29 | End: 2025-03-31 | Stop reason: HOSPADM

## 2025-03-29 RX ORDER — PANTOPRAZOLE SODIUM 40 MG/10ML
40 INJECTION, POWDER, LYOPHILIZED, FOR SOLUTION INTRAVENOUS
Status: DISCONTINUED | OUTPATIENT
Start: 2025-03-30 | End: 2025-03-31 | Stop reason: HOSPADM

## 2025-03-29 RX ORDER — GENTAMICIN 40 MG/ML
INJECTION, SOLUTION INTRAMUSCULAR; INTRAVENOUS AS NEEDED
Status: DISCONTINUED | OUTPATIENT
Start: 2025-03-29 | End: 2025-03-29

## 2025-03-29 RX ORDER — SODIUM CHLORIDE 9 MG/ML
50 INJECTION, SOLUTION INTRAVENOUS CONTINUOUS
Status: ACTIVE | OUTPATIENT
Start: 2025-03-29 | End: 2025-03-30

## 2025-03-29 RX ORDER — WARFARIN 2 MG/1
4 TABLET ORAL DAILY
Status: DISCONTINUED | OUTPATIENT
Start: 2025-03-29 | End: 2025-03-30

## 2025-03-29 RX ORDER — LIDOCAINE HYDROCHLORIDE 20 MG/ML
INJECTION, SOLUTION INFILTRATION; PERINEURAL AS NEEDED
Status: DISCONTINUED | OUTPATIENT
Start: 2025-03-29 | End: 2025-03-29

## 2025-03-29 RX ORDER — PANTOPRAZOLE SODIUM 40 MG/1
40 TABLET, DELAYED RELEASE ORAL
Status: DISCONTINUED | OUTPATIENT
Start: 2025-03-30 | End: 2025-03-31 | Stop reason: HOSPADM

## 2025-03-29 RX ORDER — CHOLECALCIFEROL (VITAMIN D3) 25 MCG
2000 TABLET ORAL DAILY
Status: DISCONTINUED | OUTPATIENT
Start: 2025-03-29 | End: 2025-03-31 | Stop reason: HOSPADM

## 2025-03-29 RX ORDER — OXYCODONE HYDROCHLORIDE 10 MG/1
10 TABLET ORAL EVERY 6 HOURS PRN
Status: DISCONTINUED | OUTPATIENT
Start: 2025-03-29 | End: 2025-03-31 | Stop reason: HOSPADM

## 2025-03-29 RX ORDER — PROPOFOL 10 MG/ML
INJECTION, EMULSION INTRAVENOUS AS NEEDED
Status: DISCONTINUED | OUTPATIENT
Start: 2025-03-29 | End: 2025-03-29

## 2025-03-29 RX ORDER — ACETAMINOPHEN 325 MG/1
975 TABLET ORAL EVERY 8 HOURS
Status: DISCONTINUED | OUTPATIENT
Start: 2025-03-29 | End: 2025-03-31 | Stop reason: HOSPADM

## 2025-03-29 RX ORDER — CEFTRIAXONE 1 G/50ML
1 INJECTION, SOLUTION INTRAVENOUS ONCE
Status: COMPLETED | OUTPATIENT
Start: 2025-03-29 | End: 2025-03-29

## 2025-03-29 RX ORDER — OXYCODONE HYDROCHLORIDE 5 MG/1
5 TABLET ORAL EVERY 6 HOURS PRN
Status: DISCONTINUED | OUTPATIENT
Start: 2025-03-29 | End: 2025-03-31 | Stop reason: HOSPADM

## 2025-03-29 RX ORDER — FENTANYL CITRATE 50 UG/ML
INJECTION, SOLUTION INTRAMUSCULAR; INTRAVENOUS AS NEEDED
Status: DISCONTINUED | OUTPATIENT
Start: 2025-03-29 | End: 2025-03-29

## 2025-03-29 RX ORDER — IPRATROPIUM BROMIDE AND ALBUTEROL SULFATE 2.5; .5 MG/3ML; MG/3ML
3 SOLUTION RESPIRATORY (INHALATION) ONCE
Status: COMPLETED | OUTPATIENT
Start: 2025-03-29 | End: 2025-03-29

## 2025-03-29 RX ORDER — ONDANSETRON HYDROCHLORIDE 2 MG/ML
4 INJECTION, SOLUTION INTRAVENOUS ONCE
Status: COMPLETED | OUTPATIENT
Start: 2025-03-29 | End: 2025-03-29

## 2025-03-29 RX ORDER — PROCHLORPERAZINE EDISYLATE 5 MG/ML
10 INJECTION INTRAMUSCULAR; INTRAVENOUS EVERY 6 HOURS PRN
Status: DISCONTINUED | OUTPATIENT
Start: 2025-03-29 | End: 2025-03-31 | Stop reason: HOSPADM

## 2025-03-29 RX ORDER — AMOXICILLIN 250 MG
2 CAPSULE ORAL 2 TIMES DAILY
Status: DISCONTINUED | OUTPATIENT
Start: 2025-03-29 | End: 2025-03-31 | Stop reason: HOSPADM

## 2025-03-29 RX ORDER — POLYETHYLENE GLYCOL 3350 17 G/17G
17 POWDER, FOR SOLUTION ORAL DAILY
Status: DISCONTINUED | OUTPATIENT
Start: 2025-03-29 | End: 2025-03-31 | Stop reason: HOSPADM

## 2025-03-29 RX ADMIN — ONDANSETRON 8 MG: 2 INJECTION, SOLUTION INTRAMUSCULAR; INTRAVENOUS at 14:03

## 2025-03-29 RX ADMIN — IPRATROPIUM BROMIDE AND ALBUTEROL SULFATE 3 ML: 2.5; .5 SOLUTION RESPIRATORY (INHALATION) at 12:41

## 2025-03-29 RX ADMIN — CEFTRIAXONE SODIUM 2 G: 2 INJECTION, SOLUTION INTRAVENOUS at 21:58

## 2025-03-29 RX ADMIN — DILTIAZEM HYDROCHLORIDE 120 MG: 120 CAPSULE, COATED, EXTENDED RELEASE ORAL at 12:42

## 2025-03-29 RX ADMIN — ACETAMINOPHEN 975 MG: 325 TABLET ORAL at 20:12

## 2025-03-29 RX ADMIN — PROPOFOL 150 MG: 10 INJECTION, EMULSION INTRAVENOUS at 14:01

## 2025-03-29 RX ADMIN — SUCCINYLCHOLINE CHLORIDE 140 MG: 20 INJECTION, SOLUTION INTRAMUSCULAR; INTRAVENOUS at 14:01

## 2025-03-29 RX ADMIN — MIDAZOLAM 2 MG: 1 INJECTION INTRAMUSCULAR; INTRAVENOUS at 14:00

## 2025-03-29 RX ADMIN — Medication 5 MG: at 20:12

## 2025-03-29 RX ADMIN — ACETAMINOPHEN 975 MG: 325 TABLET ORAL at 12:42

## 2025-03-29 RX ADMIN — FENTANYL CITRATE 100 MCG: 50 INJECTION, SOLUTION INTRAMUSCULAR; INTRAVENOUS at 14:01

## 2025-03-29 RX ADMIN — LIDOCAINE HYDROCHLORIDE 100 MG: 20 INJECTION, SOLUTION INFILTRATION; PERINEURAL at 14:01

## 2025-03-29 RX ADMIN — ONDANSETRON 4 MG: 2 INJECTION INTRAMUSCULAR; INTRAVENOUS at 08:29

## 2025-03-29 RX ADMIN — SODIUM CHLORIDE 500 ML: 9 INJECTION, SOLUTION INTRAVENOUS at 15:57

## 2025-03-29 RX ADMIN — GENTAMICIN SULFATE 80 MG: 40 INJECTION, SOLUTION INTRAMUSCULAR; INTRAVENOUS at 14:08

## 2025-03-29 RX ADMIN — CEFTRIAXONE 1 G: 1 INJECTION, SOLUTION INTRAVENOUS at 00:21

## 2025-03-29 RX ADMIN — MORPHINE SULFATE 4 MG: 4 INJECTION INTRAVENOUS at 08:30

## 2025-03-29 SDOH — SOCIAL STABILITY: SOCIAL INSECURITY: WITHIN THE LAST YEAR, HAVE YOU BEEN HUMILIATED OR EMOTIONALLY ABUSED IN OTHER WAYS BY YOUR PARTNER OR EX-PARTNER?: NO

## 2025-03-29 SDOH — SOCIAL STABILITY: SOCIAL INSECURITY: ARE THERE ANY APPARENT SIGNS OF INJURIES/BEHAVIORS THAT COULD BE RELATED TO ABUSE/NEGLECT?: NO

## 2025-03-29 SDOH — ECONOMIC STABILITY: FOOD INSECURITY: WITHIN THE PAST 12 MONTHS, THE FOOD YOU BOUGHT JUST DIDN'T LAST AND YOU DIDN'T HAVE MONEY TO GET MORE.: NEVER TRUE

## 2025-03-29 SDOH — ECONOMIC STABILITY: INCOME INSECURITY: IN THE PAST 12 MONTHS HAS THE ELECTRIC, GAS, OIL, OR WATER COMPANY THREATENED TO SHUT OFF SERVICES IN YOUR HOME?: NO

## 2025-03-29 SDOH — SOCIAL STABILITY: SOCIAL INSECURITY: ARE YOU OR HAVE YOU BEEN THREATENED OR ABUSED PHYSICALLY, EMOTIONALLY, OR SEXUALLY BY ANYONE?: NO

## 2025-03-29 SDOH — SOCIAL STABILITY: SOCIAL INSECURITY
WITHIN THE LAST YEAR, HAVE YOU BEEN KICKED, HIT, SLAPPED, OR OTHERWISE PHYSICALLY HURT BY YOUR PARTNER OR EX-PARTNER?: NO

## 2025-03-29 SDOH — SOCIAL STABILITY: SOCIAL INSECURITY
WITHIN THE LAST YEAR, HAVE YOU BEEN RAPED OR FORCED TO HAVE ANY KIND OF SEXUAL ACTIVITY BY YOUR PARTNER OR EX-PARTNER?: NO

## 2025-03-29 SDOH — SOCIAL STABILITY: SOCIAL INSECURITY: DOES ANYONE TRY TO KEEP YOU FROM HAVING/CONTACTING OTHER FRIENDS OR DOING THINGS OUTSIDE YOUR HOME?: NO

## 2025-03-29 SDOH — SOCIAL STABILITY: SOCIAL INSECURITY: WITHIN THE LAST YEAR, HAVE YOU BEEN AFRAID OF YOUR PARTNER OR EX-PARTNER?: NO

## 2025-03-29 SDOH — SOCIAL STABILITY: SOCIAL INSECURITY: ABUSE: ADULT

## 2025-03-29 SDOH — ECONOMIC STABILITY: FOOD INSECURITY: WITHIN THE PAST 12 MONTHS, YOU WORRIED THAT YOUR FOOD WOULD RUN OUT BEFORE YOU GOT THE MONEY TO BUY MORE.: NEVER TRUE

## 2025-03-29 SDOH — ECONOMIC STABILITY: HOUSING INSECURITY: IN THE LAST 12 MONTHS, WAS THERE A TIME WHEN YOU WERE NOT ABLE TO PAY THE MORTGAGE OR RENT ON TIME?: NO

## 2025-03-29 SDOH — SOCIAL STABILITY: SOCIAL INSECURITY: HAS ANYONE EVER THREATENED TO HURT YOUR FAMILY OR YOUR PETS?: NO

## 2025-03-29 SDOH — SOCIAL STABILITY: SOCIAL INSECURITY: WERE YOU ABLE TO COMPLETE ALL THE BEHAVIORAL HEALTH SCREENINGS?: YES

## 2025-03-29 SDOH — HEALTH STABILITY: MENTAL HEALTH: CURRENT SMOKER: 0

## 2025-03-29 SDOH — SOCIAL STABILITY: SOCIAL INSECURITY: HAVE YOU HAD ANY THOUGHTS OF HARMING ANYONE ELSE?: NO

## 2025-03-29 SDOH — SOCIAL STABILITY: SOCIAL INSECURITY: DO YOU FEEL UNSAFE GOING BACK TO THE PLACE WHERE YOU ARE LIVING?: NO

## 2025-03-29 SDOH — SOCIAL STABILITY: SOCIAL INSECURITY: DO YOU FEEL ANYONE HAS EXPLOITED OR TAKEN ADVANTAGE OF YOU FINANCIALLY OR OF YOUR PERSONAL PROPERTY?: NO

## 2025-03-29 SDOH — SOCIAL STABILITY: SOCIAL INSECURITY: HAVE YOU HAD THOUGHTS OF HARMING ANYONE ELSE?: NO

## 2025-03-29 SDOH — ECONOMIC STABILITY: FOOD INSECURITY: HOW HARD IS IT FOR YOU TO PAY FOR THE VERY BASICS LIKE FOOD, HOUSING, MEDICAL CARE, AND HEATING?: NOT HARD AT ALL

## 2025-03-29 ASSESSMENT — COGNITIVE AND FUNCTIONAL STATUS - GENERAL
PATIENT BASELINE BEDBOUND: NO
MOBILITY SCORE: 24
DAILY ACTIVITIY SCORE: 24

## 2025-03-29 ASSESSMENT — PATIENT HEALTH QUESTIONNAIRE - PHQ9
1. LITTLE INTEREST OR PLEASURE IN DOING THINGS: NOT AT ALL
2. FEELING DOWN, DEPRESSED OR HOPELESS: NOT AT ALL
SUM OF ALL RESPONSES TO PHQ9 QUESTIONS 1 & 2: 0

## 2025-03-29 ASSESSMENT — ENCOUNTER SYMPTOMS
CARDIOVASCULAR NEGATIVE: 1
RESPIRATORY NEGATIVE: 1
ACTIVITY CHANGE: 1
FEVER: 1
CHILLS: 1
FATIGUE: 1

## 2025-03-29 ASSESSMENT — ACTIVITIES OF DAILY LIVING (ADL)
DRESSING YOURSELF: INDEPENDENT
PATIENT'S MEMORY ADEQUATE TO SAFELY COMPLETE DAILY ACTIVITIES?: YES
ADEQUATE_TO_COMPLETE_ADL: YES
HEARING - LEFT EAR: FUNCTIONAL
TOILETING: INDEPENDENT
BATHING: INDEPENDENT
HEARING - RIGHT EAR: FUNCTIONAL
GROOMING: INDEPENDENT
LACK_OF_TRANSPORTATION: NO
JUDGMENT_ADEQUATE_SAFELY_COMPLETE_DAILY_ACTIVITIES: YES
WALKS IN HOME: INDEPENDENT
FEEDING YOURSELF: INDEPENDENT

## 2025-03-29 ASSESSMENT — PAIN - FUNCTIONAL ASSESSMENT
PAIN_FUNCTIONAL_ASSESSMENT: 0-10

## 2025-03-29 ASSESSMENT — PAIN SCALES - GENERAL
PAIN_LEVEL: 3
PAINLEVEL_OUTOF10: 8
PAINLEVEL_OUTOF10: 0 - NO PAIN
PAINLEVEL_OUTOF10: 0 - NO PAIN
PAIN_LEVEL: 2
PAINLEVEL_OUTOF10: 1
PAINLEVEL_OUTOF10: 0 - NO PAIN
PAINLEVEL_OUTOF10: 0 - NO PAIN

## 2025-03-29 ASSESSMENT — LIFESTYLE VARIABLES
HOW OFTEN DO YOU HAVE A DRINK CONTAINING ALCOHOL: NEVER
AUDIT-C TOTAL SCORE: 0
SKIP TO QUESTIONS 9-10: 1
HOW OFTEN DO YOU HAVE 6 OR MORE DRINKS ON ONE OCCASION: NEVER
HOW MANY STANDARD DRINKS CONTAINING ALCOHOL DO YOU HAVE ON A TYPICAL DAY: PATIENT DOES NOT DRINK
AUDIT-C TOTAL SCORE: 0

## 2025-03-29 ASSESSMENT — COLUMBIA-SUICIDE SEVERITY RATING SCALE - C-SSRS
6. HAVE YOU EVER DONE ANYTHING, STARTED TO DO ANYTHING, OR PREPARED TO DO ANYTHING TO END YOUR LIFE?: NO
2. HAVE YOU ACTUALLY HAD ANY THOUGHTS OF KILLING YOURSELF?: NO
1. IN THE PAST MONTH, HAVE YOU WISHED YOU WERE DEAD OR WISHED YOU COULD GO TO SLEEP AND NOT WAKE UP?: NO

## 2025-03-29 NOTE — ANESTHESIA POSTPROCEDURE EVALUATION
Patient: Hugo Gauthier    Procedure Summary       Date: 03/29/25 Room / Location: [facility] OR 01 / [facility] OR    Anesthesia Start: 1401 Anesthesia Stop: 1443    Procedure: CYSTOSCOPY AND INSERTION OF URETERAL  STENT (Right) Diagnosis:       Right kidney stone      (Right kidney stone [N20.0])    Surgeons: Maged Taylor MD Responsible Provider: Pedro Pulido MD    Anesthesia Type: general ASA Status: 4 - Emergent            Anesthesia Type: general    Vitals Value Taken Time   /58 03/29/25 1433   Temp 36.8 °C (98.2 °F) 03/29/25 1433   Pulse 83 03/29/25 1433   Resp 31 03/29/25 1433   SpO2 94 % 03/29/25 1433       Anesthesia Post Evaluation    Patient location during evaluation: PACU  Patient participation: complete - patient participated  Level of consciousness: awake  Pain score: 2  Pain management: adequate  Multimodal analgesia pain management approach  Airway patency: patent  Two or more strategies used to mitigate risk of obstructive sleep apnea  Cardiovascular status: acceptable  Respiratory status: acceptable  Hydration status: acceptable  Postoperative Nausea and Vomiting: none    No notable events documented.

## 2025-03-29 NOTE — OP NOTE
CYSTOSCOPY AND INSERTION OF URETERAL  STENT (R) Operative Note     Date: 3/29/2025  OR Location: [facility] OR    Name: Hugo Gauthier, : 1960, Age: 64 y.o., MRN: [id number], Sex: male    Diagnosis  Pre-op Diagnosis      * Right kidney stone [N20.0] Post-op Diagnosis     * Right kidney stone [N20.0]     Procedures  CYSTOSCOPY AND INSERTION OF URETERAL  STENT  78820 - LA CYSTO W/INSERT URETERAL STENT    Surgeons      * Maged Taylor - Primary    Resident/Fellow/Other Assistant:  Surgeons and Role:  * No surgeons found with a matching role *    Staff:   Scrub Person: Gayla  Surgical Assistant: Leidy  Circulator: Pretty    Anesthesia Staff: No anesthesia staff entered.    Procedure Summary  Anesthesia: General  ASA: IV  Estimated Blood Loss: 0 mL  Intra-op Medications: Administrations occurring from 1300 to 1400 on 25:  * No intraprocedure medications in log *           Anesthesia Record               Intraprocedure I/O Totals       None           Specimen:   ID Type Source Tests Collected by Time   A : URINE CULTURE Urine Indwelling (Tavera) Catheter URINE CULTURE Maged Taylor MD 3/29/2025 1418                 Drains and/or Catheters:   Urethral Catheter Non-latex 18 Fr. (Active)       Tourniquet Times:         Implants:  Implants       Type Name Action Serial No.      Implant STENT KIT, IMAJIN HYDRO, 6FR X 26CM, POSITIONER, NO GUIDEWIRE - [id number] Implanted               Findings: multiple right kidney stones on fluoroscopy, cate pus drained from right kidney and sent for culture    Indications: Hugo Gauthier is an 64 y.o. male who is having surgery for Right kidney stone [N20.0]. Patient presented with a picture of pyelonephritis secondary to obstructive stone.    The patient was seen in the preoperative area. The risks, benefits, complications, treatment options, non-operative alternatives, expected recovery and outcomes were discussed with the patient. The possibilities of reaction to medication,  pulmonary aspiration, injury to surrounding structures, bleeding, recurrent infection, the need for additional procedures, failure to diagnose a condition, and creating a complication requiring transfusion or operation were discussed with the patient. The patient concurred with the proposed plan, giving informed consent.  The site of surgery was properly noted/marked if necessary per policy. The patient has been actively warmed in preoperative area. Preoperative antibiotics have been ordered and given within 1 hours of incision. Venous thrombosis prophylaxis are not indicated.    Procedure Details: Patient was consented and antibiotics were started on call to OR.  In the operating room, under general anesthesia, with the patient in dorsolithotomy position, genitalia was prepped and draped in the usual manner.  #22 cystoscope was inserted down the urethra and bladder, and cystoscopy revealed no stones or tumors. The ureteral orifices were identified in the normal orthotopic position. Through a ureteral catheter into the cystoscope, and an Ultratrack wire was advanced into the ureter up to the level of the kidney. Then the ureteral catheter was advanced over the wire, which was pulled to allow for collection of urine to be sent for culture.    The aspirated urine from the right kidney consisted of cate pus.    Then the wire was reinserted and a 6-26 Coloplast double-J stent was advanced over the wire, with the proximal curl of visualized in the renal pelvis topography using fluoroscopy, while the distal curl was visualized in the bladder.    18Fr Tavera catheter was inserted.    This concluded the procedure.    Patient tolerated the procedure well and was transferred to PACU in stable condition.     Complications:  None; patient tolerated the procedure well.    Disposition: PACU - hemodynamically stable.  Condition: stable         Additional Details:     Attending Attestation: I performed the procedure.    Maged MERRITT  Brandon  Phone Number: [phone]

## 2025-03-29 NOTE — H&P
[facility] Hospitalist History and Physical Note         Hugo Gauthier  :  1960(64 y.o.)  MRN:  [id number]  PCP: Edilma Luna MD    Assessment & Plan  Pyelonephritis      Assessment and Plan:     Hugo Gauthier is a 64 y.o. male with PMH DVT, chronic LLE wound, chronic venous stasis, CAD, HTN, HFpEF, chronic LBP, RADHA (not on PAP therapy), former smoker and Class III obesity presented to [place] ED with 1 week of worsening generalized abdominal pain associated with diarrhea and urinary incontinence, subjective fever, rigor, diaphoresis, anorexia and lethargy. Denies nausea or emesis. Patient reported stopped taking his medications during his illness    In ED VSS. Labs was significant for hyponatremia (130), azotemia (22/1.36), hypoalbuminemia (3.1), subtherapeutic INR (1.5), leukocytosis (12.9), negative Flu/COVID-19,UA positive for pyuria and hematuria. CT A/P was positive for severe right hydronephrosis due to obstructive renal calculus (1.6 x 1.1 x 2.8 cm ), B/L nephrolithiasis, right perinephric stranding, mild retroperitoneal adenopath, Mildly dilated, fluid-filled, appendix, may be secondary to the presence of liquid stool at the cecum.    Patient was admitted for right pyelonephritis with severe R hydronephrosis due to obstructive renal stone.    #Right pyelonephritis  #Severe R hydronephrosis due to obstructive renal stone  #OLIMPIA  #Mildly dilated, fluid-filled, appendix r/o early appendicitis  #B/L nephrolithiasis  #Chronic HFpEF  #CAD  #RADHA (not on PAP therapy)  #Chronic venous stasis  #Class III obesity is 44.77 kg/m² with serious co morbidities  -Ceftriaxone pending Cx  -IVF w/ caution 2/2 CHF, NPO, analgesics  -awaiting uro eval and intervention  -permissive HTN, home antihypertensives 2/2 OLIMPIA  -resume rest of home meds as indicated  -CS recs appreciated: urology, surgery     Disposition: await test results, await consultant recommendations, and await clinical improvement    DVT Prophylaxis:  SCDs    Code status: Full Code  Diet: NPO Diet; Effective now    BMI Classification: There is no height or weight on file to calculate BMI. Class III obesity BMI 40 or >    Level of MDM:  High    discussed with nurse, discussed with TCC/SW, patient and family updated, I personally examined the patient, and I personally reviewed chart, data, labs radiology reports    Family Communication  Number:   Name of Designated Family Representative:       Total time spent: 75 minutes, of total time providing counseling or in coordination of care.  Total time on this day of visit includes record and documentation review before and after visit including documentation and time not explicitly included on EMR time stamp.    [id number]: Please page me for patient care issues.  [phone]: Please page night hospitalist for any issues.        Subjective:   Chief complaints: Abdominal pain    Interval History:    Hugo Gauthier is a 64 y.o. male with PMH DVT, chronic LLE wound, chronic venous stasis, CAD, HTN, HFpEF, chronic LBP, RADHA (not on PAP therapy), former smoker and Class III obesity presented to [place] ED with 1 week of worsening generalized abdominal pain associated with diarrhea and urinary incontinence, subjective fever, rigor, diaphoresis, anorexia and lethargy. Denies nausea or emesis. Patient reported stopped taking his medications during his illness    In ED VSS. Labs was significant for hyponatremia (130), azotemia (22/1.36), hypoalbuminemia (3.1), subtherapeutic INR (1.5), leukocytosis (12.9), negative Flu/COVID-19,UA positive for pyuria and hematuria. CT A/P was positive for severe right hydronephrosis due to obstructive renal calculus (1.6 x 1.1 x 2.8 cm ), B/L nephrolithiasis, right perinephric stranding, mild retroperitoneal adenopath, Mildly dilated, fluid-filled, appendix, may be secondary to the presence of liquid stool at the cecum.    Patient was admitted for right pyelonephritis with severe R hydronephrosis due  to obstructive renal stone.    Temp:  [37.1 °C (98.8 °F)] 37.1 °C (98.8 °F)  Heart Rate:  [] 88  Resp:  [13-20] 18  BP: (121-167)/(61-88) 136/68  Lab Results   Component Value Date    GLUCOSE 121 (H) 03/28/2025    CALCIUM 8.9 03/28/2025     (L) 03/28/2025    K 3.9 03/28/2025    CO2 23 03/28/2025    CL 98 03/28/2025    BUN 22 03/28/2025    CREATININE 1.36 (H) 03/28/2025     Lab Results   Component Value Date    WBC 12.9 (H) 03/28/2025    HGB 11.2 (L) 03/28/2025    HCT 35.3 (L) 03/28/2025    MCV 86 03/28/2025     03/28/2025     IMAGES:  Encounter Date: 01/27/25   ECG 12 lead (Clinic Performed)   Result Value    Ventricular Rate 68    Atrial Rate 68    FL Interval 188    QRS Duration 94    QT Interval 360    QTC Calculation(Bazett) 382    P Axis 63    R Axis 28    T Axis 34    QRS Count 11    Q Onset 222    P Onset 128    P Offset 193    T Offset 402    QTC Fredericia 375    Narrative    Normal sinus rhythm  Normal ECG  When compared with ECG of 25-SEP-2023 09:41,  QT has shortened  Confirmed by Gorge Medeiros ([id number]) on 1/27/2025 1:24:11 PM        No echocardiogram results found for the past 12 months  === 01/27/25 ===    XR SHOULDER 2+ VIEWS RIGHT    - Impression -  No acute findings.    MACRO:  None    Signed by: Hugo Dobbs 1/29/2025 11:39 AM  Dictation workstation:   [id number]  === 03/28/25 ===    CT ABDOMEN PELVIS W IV CONTRAST    - Impression -  1. Right-sided obstructive uropathy with severe right hydronephrosis  and with a 1.6 x 1.1 x 2.8 cm calculus at the proximal right ureter.  2. Mild right perinephric stranding and trace amount of perinephric  fluid, may be secondary to the above findings. Please correlate with  laboratory values/urinalysis to exclude superimposed acute  pyelonephritis.  3. Bilateral nephrolithiasis.  4. Interval development of mild retroperitoneal adenopathy.  Evaluation with nonemergent scrotal ultrasound recommended to exclude  testicular pathology.  5. Liquid stool  at the cecum, ascending colon and the transverse  colon, may be seen with infectious/inflammatory colitis.  6. Mildly dilated, fluid-filled, appendix, may be secondary to the  presence of liquid stool at the cecum. Clinical correlation  recommended to exclude early/developing acute appendicitis.  7. Colonic diverticulosis.  8. Mild bilateral inguinal adenopathy, left more than right, mildly  decreased since prior CT 08/21/2023.            MACRO:  None.    Signed by: Laurita Meng 3/29/2025 12:13 AM  Dictation workstation:   [id number]  === 01/27/25 ===    XR SHOULDER 2+ VIEWS RIGHT    - Impression -  No acute findings.    MACRO:  None    Signed by: Hugo Dobbs 1/29/2025 11:39 AM  Dictation workstation:   [id number]      Past Medical History:   Diagnosis Date    Arrhythmia     Clotting disorder (Multi)     Coronary artery disease involving native coronary artery of native heart 10/20/2023    Disease of thyroid gland     Hypertension     Personal history of diseases of the blood and blood-forming organs and certain disorders involving the immune mechanism 10/19/2021    History of iron deficiency anemia    Personal history of diseases of the blood and blood-forming organs and certain disorders involving the immune mechanism 10/19/2021    History of anemia     Past Surgical History:   Procedure Laterality Date    CARDIAC CATHETERIZATION N/A 10/13/2023    Procedure: Left Heart Cath;  Surgeon: Jude Lamas MD;  Location: [facility] Cardiac Cath Lab;  Service: Cardiovascular;  Laterality: N/A;  PACE ORDERING / MORALES DOING     No family history on file.  Social History     Socioeconomic History    Marital status:      Spouse name: Not on file    Number of children: Not on file    Years of education: Not on file    Highest education level: Not on file   Occupational History    Not on file   Tobacco Use    Smoking status: Former     Current packs/day: 1.00     Average packs/day: 1 pack/day for 20.0 years (20.0 ttl  pk-yrs)     Types: Cigarettes     Passive exposure: Never    Smokeless tobacco: Never   Vaping Use    Vaping status: Never Used   Substance and Sexual Activity    Alcohol use: Not Currently    Drug use: Not Currently    Sexual activity: Defer   Other Topics Concern    Not on file   Social History Narrative    Not on file     Social Drivers of Health     Financial Resource Strain: Not on file   Food Insecurity: Not on file   Transportation Needs: Not on file   Physical Activity: Not on file   Stress: Not on file   Social Connections: Not on file   Intimate Partner Violence: Not on file   Housing Stability: Not on file       No Known Allergies  Prior to Admission medications    Medication Sig Start Date End Date Taking? Authorizing Provider   alpha tocopherol (Vitamin E) 268 mg (400 unit) capsule Take by mouth.   Yes Historical Provider, MD   ascorbic acid (Vitamin C) 1,000 mg tablet Take 1 tablet (1,000 mg) by mouth once daily. 10/4/19  Yes Historical Provider, MD   cholecalciferol (Vitamin D-3) 50 MCG (2000 UT) tablet Take 1 tablet (2,000 Units) by mouth once daily. 10/4/19  Yes Historical Provider, MD   dilTIAZem CD (Cardizem CD) 120 mg 24 hr capsule TAKE 1 CAPSULE(120 MG) BY MOUTH EVERY DAY 7/19/24  Yes SHERLEY Justice   folic acid (Folvite) 800 mcg tablet Take 1 tablet (800 mcg) by mouth once daily. 10/4/19  Yes Historical Provider, MD   iodine, bulk, crystals USE AS DIRECTED. 10/4/19  Yes Historical Provider, MD   lisinopril 20 mg tablet Take 1 tablet (20 mg) by mouth once daily. 6/19/24 6/19/25 Yes Edilma Luna MD   magnesium oxide (Mag-Ox) 400 mg tablet Take 1 tablet (400 mg) by mouth once daily. 10/4/19  Yes Historical Provider, MD   selenium 200 mcg tablet Take 1 tablet (200,000 mcg) by mouth once daily. 10/4/19  Yes Historical Provider, MD   spironolactone (Aldactone) 25 mg tablet TAKE 1 TABLET BY MOUTH ONCE DAILY 1/15/25  Yes SHERLEY Justice   warfarin (Coumadin) 1 mg tablet TAKE  "1 TABLET BY MOUTH EVERY DAY 12/7/23  Yes Edilma Luna MD   warfarin (Coumadin) 4 mg tablet TAKE 1 TABLET EVERY DAY 3/17/25  Yes Edilma Luna MD   zinc gluconate 100 mg tablet Take 1 tablet (100 mg) by mouth once daily. 5/22/23  Yes Edilma Luna MD   ketoconazole (NIZOral) 2 % cream APPLY SPARINGLY TO AFFECTED AREA(S) TWICE DAILY  Patient not taking: Reported on 3/29/2025 12/7/23   Edilma Luna MD   nystatin (Mycostatin) cream APPLY TOPICALLY TO AFFECTED AREA TWICE DAILY FOR 7 DAYS  Patient not taking: Reported on 3/29/2025 10/3/24   Edilma Luna MD   SantyL 250 unit/gram ointment  6/21/24   Historical MD Stephanie   sildenafil (Viagra) 100 mg tablet TAKE 1 TABLET BY MOUTH ONCE DAILY AS NEEDED  Patient not taking: Reported on 3/29/2025 10/14/24   Edilma Luna MD   silver sulfADIAZINE (Silvadene) 1 % cream Apply 1 Application topically 2 times a day.  Patient not taking: Reported on 3/29/2025 2/10/25   Edilma Luna MD   triamcinolone (Kenalog) 0.1 % cream Apply topically 2 times a day. Apply to affected area 1-2 times daily as needed.  Patient not taking: Reported on 3/29/2025 2/10/25   Edilma Luna MD   BD Luer-Juliette Syringe 3 mL 25 gauge x 1\" syringe 1 mL every 14 (fourteen) days. Using syringe. 2 per month.  Patient not taking: Reported on 3/25/2025 3/6/24 3/25/25  Edilma Luna MD   testosterone cypionate (Depo-Testosterone) 200 mg/mL injection Inject 1 mL (200 mg) into the shoulder, thigh, or buttocks every 14 (fourteen) days.  Patient not taking: Reported on 3/25/2025 10/4/24 3/25/25  Edilma Luna MD   tiZANidine (Zanaflex) 2 mg tablet TAKE ONE TABLET BY MOUTH AT BEDTIME  Patient not taking: Reported on 3/25/2025 3/27/24 3/25/25  Edilma Luna MD       Review of Systems   All other systems reviewed and are negative.      Objective:   There were no vitals filed for this visit.  Physical Exam  Vitals and nursing note reviewed. "   Constitutional:       General: He is not in acute distress.     Appearance: Normal appearance. He is morbidly obese. He is ill-appearing.   HENT:      Head: Normocephalic and atraumatic.      Right Ear: External ear normal.      Left Ear: External ear normal.      Nose: Nose normal. No congestion or rhinorrhea.      Mouth/Throat:      Mouth: Mucous membranes are moist.   Eyes:      Extraocular Movements: Extraocular movements intact.      Pupils: Pupils are equal, round, and reactive to light.   Cardiovascular:      Rate and Rhythm: Normal rate and regular rhythm.      Pulses: Normal pulses.      Heart sounds: Normal heart sounds.   Pulmonary:      Effort: Pulmonary effort is normal.      Breath sounds: Normal breath sounds.   Abdominal:      General: Abdomen is flat. Bowel sounds are normal. There is distension.      Palpations: Abdomen is soft.      Tenderness: There is generalized abdominal tenderness. There is right CVA tenderness. There is no left CVA tenderness.   Musculoskeletal:         General: Normal range of motion.      Cervical back: Normal range of motion and neck supple.      Right lower leg: Edema present.      Left lower leg: Edema present.   Skin:     General: Skin is warm and dry.      Capillary Refill: Capillary refill takes less than 2 seconds.   Neurological:      General: No focal deficit present.      Mental Status: He is alert and oriented to person, place, and time. Mental status is at baseline.   Psychiatric:         Mood and Affect: Mood normal.         Thought Content: Thought content normal.         Judgment: Judgment normal.         Lab Results   Component Value Date     (L) 03/28/2025    K 3.9 03/28/2025    CL 98 03/28/2025    CO2 23 03/28/2025    BUN 22 03/28/2025    CREATININE 1.36 (H) 03/28/2025    GLUCOSE 121 (H) 03/28/2025    CALCIUM 8.9 03/28/2025    PROT 6.8 03/28/2025    BILITOT 1.1 03/28/2025    ALKPHOS 65 03/28/2025    AST 22 03/28/2025    ALT 24 03/28/2025     Lab  Results   Component Value Date    WBC 12.9 (H) 03/28/2025    HGB 11.2 (L) 03/28/2025    HCT 35.3 (L) 03/28/2025    MCV 86 03/28/2025     03/28/2025     Lab Results   Component Value Date    TSH 0.86 07/29/2024     Lab Results   Component Value Date    LACTATE 0.8 03/28/2025    BNP 22 01/27/2025    INR 1.5 (H) 03/28/2025     Additional results since admission have been reviewed.    Dictated using BioSET Version 2.4  Proof read however unrecognized voice recognition errors may have occurred     Electronically signed by Louise Varma DO on 03/29/25 at 12:02 PM

## 2025-03-29 NOTE — ED PROVIDER NOTES
HPI   Chief Complaint   Patient presents with    Diarrhea    Abdominal Pain    Weakness, Gen     Pt c/o abdominal pain, diarrhea, urinary incontinence, chills, sweats, decreased PO intake, and general weakness since 3/24. Pt states abdominal pain is mostly better, still has diarrhea, has fallen twice due to weakness. (Pt is on coumadin, denies injury, denies hitting head, denies LOC)       This is a 64-year-old male past medical history of chronic venous stasis, hypertension, coronary artery disease, heart failure with preserved EF who presents to the emergency department for diarrhea and abdominal pain. Pt states that since Monday he has been having weakness and diarrhea.  States that he has been feeling so weak that he should having multiple falls he is on Coumadin not hit his head did not lose consciousness.                          [place] Coma Scale Score: 15                  Patient History   Past Medical History:   Diagnosis Date    Arrhythmia     Clotting disorder (Multi)     Coronary artery disease involving native coronary artery of native heart 10/20/2023    Disease of thyroid gland     Hypertension     Personal history of diseases of the blood and blood-forming organs and certain disorders involving the immune mechanism 10/19/2021    History of iron deficiency anemia    Personal history of diseases of the blood and blood-forming organs and certain disorders involving the immune mechanism 10/19/2021    History of anemia     Past Surgical History:   Procedure Laterality Date    CARDIAC CATHETERIZATION N/A 10/13/2023    Procedure: Left Heart Cath;  Surgeon: Jude Lamas MD;  Location: [facility] Cardiac Cath Lab;  Service: Cardiovascular;  Laterality: N/A;  PACE ORDERING / MORALES DOING     No family history on file.  Social History     Tobacco Use    Smoking status: Former     Current packs/day: 1.00     Average packs/day: 1 pack/day for 20.0 years (20.0 ttl pk-yrs)     Types: Cigarettes     Passive exposure:  Never    Smokeless tobacco: Never   Vaping Use    Vaping status: Never Used   Substance Use Topics    Alcohol use: Not Currently    Drug use: Not Currently       Physical Exam   ED Triage Vitals [03/28/25 2105]   Temperature Heart Rate Respirations BP   37.1 °C (98.8 °F) 100 18 129/71      Pulse Ox Temp Source Heart Rate Source Patient Position   95 % Temporal -- --      BP Location FiO2 (%)     -- --       Physical Exam  Constitutional:       General: He is not in acute distress.  HENT:      Head: Normocephalic and atraumatic.      Right Ear: Tympanic membrane normal.      Left Ear: Tympanic membrane normal.      Mouth/Throat:      Mouth: Mucous membranes are moist.   Eyes:      Extraocular Movements: Extraocular movements intact.      Conjunctiva/sclera: Conjunctivae normal.      Pupils: Pupils are equal, round, and reactive to light.   Cardiovascular:      Rate and Rhythm: Normal rate and regular rhythm.      Heart sounds: No murmur heard.  Pulmonary:      Effort: Pulmonary effort is normal. No respiratory distress.      Breath sounds: Normal breath sounds. No stridor. No wheezing or rales.   Abdominal:      General: Bowel sounds are normal. There is no distension.      Tenderness: There is generalized abdominal tenderness. There is no guarding or rebound.   Musculoskeletal:         General: No swelling, tenderness or deformity. Normal range of motion.   Skin:     General: Skin is warm and dry.      Coloration: Skin is not jaundiced.      Findings: No bruising or lesion.   Neurological:      General: No focal deficit present.      Mental Status: He is alert and oriented to person, place, and time. Mental status is at baseline.      Cranial Nerves: No cranial nerve deficit.      Motor: No weakness.   Psychiatric:         Mood and Affect: Mood normal.       Labs Reviewed - No data to display  No orders to display       ED Course & MDM   ED Course as of 03/29/25 0136   Fri Mar 28, 2025   2149 EKG on my read shows  sinus rhythm at a rate 94 bpm no ST elevation seen, normal intervals. [CF]   2159 WBC(!): 12.9 [CF]   2246 Creatinine(!): 1.36 [CF]   Sat Mar 29, 2025   0013 WBC, Urine(!): 11-20 [CF]   0013 Nitrite, Urine(!): 2+ [CF]   0013 Leukocyte Esterase, Urine(!): 500 Roberto Carlos/uL [CF]   0019 IMPRESSION:  1. Right-sided obstructive uropathy with severe right hydronephrosis  and with a 1.6 x 1.1 x 2.8 cm calculus at the proximal right ureter.  2. Mild right perinephric stranding and trace amount of perinephric  fluid, may be secondary to the above findings. Please correlate with  laboratory values/urinalysis to exclude superimposed acute  pyelonephritis.  3. Bilateral nephrolithiasis.  4. Interval development of mild retroperitoneal adenopathy.  Evaluation with nonemergent scrotal ultrasound recommended to exclude  testicular pathology.  5. Liquid stool at the cecum, ascending colon and the transverse  colon, may be seen with infectious/inflammatory colitis.  6. Mildly dilated, fluid-filled, appendix, may be secondary to the  presence of liquid stool at the cecum. Clinical correlation  recommended to exclude early/developing acute appendicitis.  7. Colonic diverticulosis.  8. Mild bilateral inguinal adenopathy, left more than right, mildly  decreased since prior CT 08/21/2023.   [CF]      ED Course User Index  [CF] Larissa Nicholson MD       Medical Decision Making  This is a 64-year-old male who presents with department for fevers chills abdominal pain diarrhea.  On exam he has right upper quadrant abdominal pain back Pain.  Sodium slightly low but his creatinine is worse from prior.  COVID and flu are negative.  Patient does appear to have a urinary tract infection.  CT of his abdomen shows concerns for right-sided obstructive pathology with severe hydro nephrosis and pyelonephritis.  Is also concerns for possible appendicitis ago my repeat exam was having no tender palpation on his right lower quadrant.  At this time patient  was admitted as transfer to [facility] since we do not have urology coverage here.        Procedure  Procedures     Larissa Nicholson MD  03/29/25 0136

## 2025-03-29 NOTE — ANESTHESIA PREPROCEDURE EVALUATION
Patient: Hugo Gauthier    Procedure Information       Date/Time: 03/29/25 1300    Procedure: INSERTION, STENT, URETER (Right)    Location: [facility] OR [facility] / [facility]    Surgeons: Maged Taylor MD            Relevant Problems   Cardiac   (+) Atrial fibrillation (Multi)   (+) Coronary artery disease involving native coronary artery of native heart   (+) HTN (hypertension)   (+) Hypertension   (+) Other chest pain      Pulmonary   (+) Dyspnea on exertion   (+) RADHA (obstructive sleep apnea)   (+) SOBOE (shortness of breath on exertion)      Neuro   (+) Anxiety   (+) Test anxiety      /Renal   (+) Acute urinary tract infection   (+) Pyelonephritis   (+) Right kidney stone      Endocrine   (+) Class 3 severe obesity due to excess calories with serious comorbidity and body mass index (BMI) of 45.0 to 49.9 in adult   (+) Morbid obesity (Multi)      Hematology   (+) Anemia   (+) Anemia due to blood loss   (+) Iron deficiency anemia      Musculoskeletal   (+) Chronic bilateral low back pain without sciatica   (+) Chronic low back pain   (+) Lumbar spinal stenosis      ID   (+) Acute urinary tract infection   (+) Candidiasis of intestine   (+) Local infection of wound   (+) Onychomycosis of toenail   (+) Pubic lice   (+) Pyelonephritis   (+) Tinea cruris       Clinical information reviewed:    Allergies  Meds               NPO Detail:  No data recorded     Physical Exam    Airway  Mallampati: III  Neck ROM: full     Cardiovascular   Rhythm: regular     Dental    Pulmonary   (+) decreased breath sounds     Abdominal   Abdomen: soft       Other findings: Morbid obesity      Anesthesia Plan    History of general anesthesia?: yes  History of complications of general anesthesia?: unknown/emergency    ASA 4 - emergent     general     The patient is not a current smoker.    intravenous induction   Anesthetic plan and risks discussed with patient.    Plan discussed with CRNA and attending.

## 2025-03-29 NOTE — CARE PLAN
The patient's goals for the shift include  having surgical procedure.    The clinical goals for the shift include Patient will have vital sings WNL

## 2025-03-29 NOTE — ANESTHESIA POSTPROCEDURE EVALUATION
Patient: Hugo Gauthier    Procedure Summary       Date: 03/29/25 Room / Location: [facility] OR [facility] / [facility]    Anesthesia Start: 1401 Anesthesia Stop: 1443    Procedure: CYSTOSCOPY AND INSERTION OF URETERAL  STENT (Right) Diagnosis:       Right kidney stone      (Right kidney stone [N20.0])    Surgeons: Maged Taylor MD Responsible Provider: Pedro Pulido MD    Anesthesia Type: general ASA Status: 4 - Emergent            Anesthesia Type: general    Vitals Value Taken Time   /58 03/29/25 1433   Temp 36.8 °C (98.2 °F) 03/29/25 1433   Pulse 83 03/29/25 1433   Resp 31 03/29/25 1433   SpO2 94 % 03/29/25 1433       Anesthesia Post Evaluation    Patient location during evaluation: PACU  Patient participation: complete - patient participated  Level of consciousness: awake and alert  Pain score: 3  Pain management: adequate  Airway patency: patent  Cardiovascular status: acceptable  Respiratory status: acceptable  Hydration status: acceptable  Postoperative Nausea and Vomiting: none        There were no known notable events for this encounter.

## 2025-03-29 NOTE — LETTER
March 31, 2025     Patient: Hugo Gauthier   YOB: 1960   Date of Visit: 3/29/2025       To Whom It May Concern:    Hugo Gauthier was admitted at [facility] from 3/29/2025  to 3/31/2025. Please excuse Hugo for his absence from work during this period. Hugo may return to work on 4/8/2025.    If you have any questions or concerns, please don't hesitate to call.         Sincerely,       Louise Varma, DO

## 2025-03-29 NOTE — NURSING NOTE
Patient arrived from [place]  Nursing supervisor notified of patients arrival so Urology could be notified.     Old dressing removed from LLE, cleansed with NS, Xeroform and kerlix applied. Order placed for wound consult.    1335- Patient leaving floor for OR, CHG done    1450- PACU called report. Cysto performed with stent placement. Patient had a lot of pus present, salinas was placed. IVF to finish before patient returns. Wife in room and updated.     HUA MERINO, LPN

## 2025-03-29 NOTE — CONSULTS
Inpatient consult to Urology  Consult performed by: Maged Taylor MD  Consult ordered by: Louise Varma DO  Reason for consult: Right kidney stone and pyelonephritis  Assessment/Recommendations: IV hydration  IV antibiotics  Take today for cystoscopy and stent insertion with intraoperative cultures  Defer stone management for future date based on his recovery          Reason For Consult  Right kidney stone and pyelonephritis     History Of Present Illness  Hugo Gauthier is a 64 y.o. male presenting with picture of right pyelonephritis secondary to obstructive uropathy.  The patient has chronic back pain and has been having back pain for some time, however this increased since 5 days and he gradually started to get weaker and noticed fever and chills since yesterday, which prompted his presentation to the emergency room at [facility].  Investigation revealed picture of pyelonephritis with an obstructive large right kidney stone, and multiple other kidney stones.    He received IV antibiotics and was transferred to [facility] for management.     Past Medical History  He has a past medical history of Arrhythmia, Clotting disorder (Multi), Coronary artery disease involving native coronary artery of native heart (10/20/2023), Disease of thyroid gland, Hypertension, Personal history of diseases of the blood and blood-forming organs and certain disorders involving the immune mechanism (10/19/2021), and Personal history of diseases of the blood and blood-forming organs and certain disorders involving the immune mechanism (10/19/2021).    He has no past medical history of AAA (abdominal aortic aneurysm), Asthma, Cancer (Multi), Carotid artery occlusion, CHF (congestive heart failure), Chronic kidney disease, Heart murmur, Hyperlipidemia, Stroke (Multi), or Syncope.    Surgical History  He has a past surgical history that includes Cardiac catheterization (N/A, 10/13/2023).     Social History  He reports that he has quit  smoking. His smoking use included cigarettes. He has a 20 pack-year smoking history. He has never been exposed to tobacco smoke. He has never used smokeless tobacco. He reports that he does not currently use alcohol. He reports that he does not currently use drugs.    Family History  No family history on file.     Allergies  Patient has no known allergies.    Review of Systems   Constitutional:  Positive for activity change, chills, fatigue and fever.   Respiratory: Negative.     Cardiovascular: Negative.         Physical Exam  Constitutional:       Appearance: He is obese. He is toxic-appearing and diaphoretic.   Cardiovascular:      Rate and Rhythm: Normal rate.   Pulmonary:      Effort: Pulmonary effort is normal.   Abdominal:      General: Abdomen is flat.      Tenderness: There is right CVA tenderness. There is no left CVA tenderness.          Last Recorded Vitals  Blood pressure 126/65, pulse 85, temperature 36.7 °C (98.1 °F), temperature source Temporal, resp. rate 19, SpO2 95%.    Relevant Results  Results for orders placed or performed during the hospital encounter of 03/29/25 (from the past 24 hours)   Renal Function Panel   Result Value Ref Range    Glucose 104 (H) 74 - 99 mg/dL    Sodium 131 (L) 136 - 145 mmol/L    Potassium 4.1 3.5 - 5.3 mmol/L    Chloride 97 (L) 98 - 107 mmol/L    Bicarbonate 25 21 - 32 mmol/L    Anion Gap 13 10 - 20 mmol/L    Urea Nitrogen 21 6 - 23 mg/dL    Creatinine 1.34 (H) 0.50 - 1.30 mg/dL    eGFR 60 (L) >60 mL/min/1.73m*2    Calcium 9.1 8.6 - 10.3 mg/dL    Phosphorus 2.9 2.5 - 4.9 mg/dL    Albumin 3.5 3.4 - 5.0 g/dL   Magnesium   Result Value Ref Range    Magnesium 2.11 1.60 - 2.40 mg/dL   C-reactive protein   Result Value Ref Range    C-Reactive Protein 24.09 (H) <1.00 mg/dL   Protime-INR   Result Value Ref Range    Protime 15.7 (H) 9.8 - 12.4 seconds    INR 1.4 (H) 0.9 - 1.1   CBC and Auto Differential   Result Value Ref Range    WBC 13.4 (H) 4.4 - 11.3 x10*3/uL    nRBC 0.0  0.0 - 0.0 /100 WBCs    RBC 4.10 (L) 4.50 - 5.90 x10*6/uL    Hemoglobin 11.6 (L) 13.5 - 17.5 g/dL    Hematocrit 36.4 (L) 41.0 - 52.0 %    MCV 89 80 - 100 fL    MCH 28.3 26.0 - 34.0 pg    MCHC 31.9 (L) 32.0 - 36.0 g/dL    RDW 17.2 (H) 11.5 - 14.5 %    Platelets 195 150 - 450 x10*3/uL    Neutrophils % 75.6 40.0 - 80.0 %    Immature Granulocytes %, Automated 1.0 (H) 0.0 - 0.9 %    Lymphocytes % 10.1 13.0 - 44.0 %    Monocytes % 12.4 2.0 - 10.0 %    Eosinophils % 0.7 0.0 - 6.0 %    Basophils % 0.2 0.0 - 2.0 %    Neutrophils Absolute 10.13 (H) 1.20 - 7.70 x10*3/uL    Immature Granulocytes Absolute, Automated 0.13 0.00 - 0.70 x10*3/uL    Lymphocytes Absolute 1.36 1.20 - 4.80 x10*3/uL    Monocytes Absolute 1.67 (H) 0.10 - 1.00 x10*3/uL    Eosinophils Absolute 0.10 0.00 - 0.70 x10*3/uL    Basophils Absolute 0.03 0.00 - 0.10 x10*3/uL   Lactate   Result Value Ref Range    Lactate 0.8 0.4 - 2.0 mmol/L     *Note: Due to a large number of results and/or encounters for the requested time period, some results have not been displayed. A complete set of results can be found in Results Review.     CT abdomen pelvis w IV contrast    Result Date: 3/29/2025  Interpreted By:  Laurita Meng, STUDY: CT ABDOMEN PELVIS W IV CONTRAST;  3/28/2025 11:15 pm   INDICATION: Signs/Symptoms:abdominal pain generalized.   COMPARISON: CT abdomen and pelvis 08/21/2023.   ACCESSION NUMBER(S): [id number]   ORDERING CLINICIAN: STEWART GARCIA   TECHNIQUE: Axial CT of the abdomen and pelvis was performed. Coronal and sagittal reconstructions were performed.   Intravenous contrast material was administered without immediate complication.   FINDINGS: LOWER CHEST: No consolidation or pleural effusion.   ABDOMEN:   LIVER: No focal lesion is identified.   BILE DUCTS: No significant dilation.   GALLBLADDER: Contracted.   PANCREAS: No peripancreatic inflammatory changes.   SPLEEN: Unremarkable.   ADRENAL GLANDS: Unremarkable.   KIDNEYS AND URETERS: There is  right-sided perinephric stranding and small amount of perinephric fluid. There is severe right hydronephrosis. There is a 1.6 x 1.1 x 2.8 cm (AP by transverse by craniocaudal diameter) calculus at the proximal right ureter. Multiple calculi are noted at the right kidney, the largest at the inferior pole measures 3.8 cm. No left hydronephrosis or hydroureter. There is a 0.9 cm calculus at the inferior pole of the left kidney.   PELVIS:   BLADDER: Distended.   REPRODUCTIVE ORGANS: The prostate measures 4.8 cm in transverse diameter.   BOWEL: The stomach is mostly decompressed. No bowel obstruction. There is colonic diverticulosis with no CT evidence for acute diverticulitis. There is liquid stool at the cecum, ascending colon and the transverse colon. The appendix is fluid-filled and mildly dilated to 10 mm.   VESSELS: Atherosclerotic calcifications within the abdominal aorta and its branches. No abdominal aortic aneurysm. Vascular stent noted at the right common iliac and external iliac veins.   PERITONEUM/RETROPERITONEUM/LYMPH NODES: No free fluid or free air.  No retroperitoneal hemorrhage. Interval development of mild retroperitoneal adenopathy. A retrocaval lymph node measures 1.4 cm in short axis.   BONES AND ABDOMINAL WALL: Multilevel degenerative changes are noted in the spine. Severe degenerative changes at the right hip with loss of joint space, subchondral sclerosis and cyst formation. Moderate degenerative changes at the left hip. There is a small fat containing umbilical hernia. Enlarged lymph nodes are noted at the bilateral inguinal regions measuring 1.5 cm in short axis on the right (previously measured 1.6 cm on CT 08/21/2023) and 1.9 cm in short axis on the left (previously measured 2.6 cm on CT 08/21/2023).       1. Right-sided obstructive uropathy with severe right hydronephrosis and with a 1.6 x 1.1 x 2.8 cm calculus at the proximal right ureter. 2. Mild right perinephric stranding and trace  amount of perinephric fluid, may be secondary to the above findings. Please correlate with laboratory values/urinalysis to exclude superimposed acute pyelonephritis. 3. Bilateral nephrolithiasis. 4. Interval development of mild retroperitoneal adenopathy. Evaluation with nonemergent scrotal ultrasound recommended to exclude testicular pathology. 5. Liquid stool at the cecum, ascending colon and the transverse colon, may be seen with infectious/inflammatory colitis. 6. Mildly dilated, fluid-filled, appendix, may be secondary to the presence of liquid stool at the cecum. Clinical correlation recommended to exclude early/developing acute appendicitis. 7. Colonic diverticulosis. 8. Mild bilateral inguinal adenopathy, left more than right, mildly decreased since prior CT 08/21/2023.           MACRO: None.   Signed by: Laurita Meng 3/29/2025 12:13 AM Dictation workstation:   [id number]        Assessment/Plan     64-year-old male with multiple medical comorbidities, with right kidney stones including and obstructive UPJ stone, with a picture of pyelonephritis, and OLIMPIA.    I personally reviewed the medical records of the patient including the note of the referring physician including the CT images as well as report examining the kidney appearance as well as the stones.    I discussed with the patient his situation, and that at this point the priority is to decompress the kidney and the further stone management for future date.  We will start with IV antibiotics and stent insertion cystoscopically, and keep him admitted until he improves.    Patient agrees to the plan and would like to proceed.    Plan:  IV hydration  IV antibiotics  Take today for cystoscopy and stent insertion with intraoperative cultures  Defer stone management for future date based on his recovery

## 2025-03-29 NOTE — ANESTHESIA PROCEDURE NOTES
Airway  Date/Time: 3/29/2025 2:02 PM  Urgency: elective    Difficult airway    Staffing  Performed: CRNA and attending   Authorized by: Pedro Pulido MD    Performed by: Pedro Pulido MD  Patient location during procedure: OR    Indications and Patient Condition  Indications for airway management: anesthesia and airway protection  Spontaneous ventilation: present  Sedation level: deep  Preoxygenated: yes  Patient position: sniffing  MILS maintained throughout  Mask difficulty assessment: 0 - not attempted  No planned trial extubation    Final Airway Details  Final airway type: endotracheal airway      Successful airway: ETT  Cuffed: yes   Successful intubation technique: video laryngoscopy  Endotracheal tube insertion site: oral  Blade: Darrick  Blade size: #4  ETT size (mm): 8.0  Cormack-Lehane Classification: grade I - full view of glottis  Placement verified by: chest auscultation and capnometry   Measured from: lips  ETT to lips (cm): 24  Number of attempts at approach: 1  Number of other approaches attempted: 0

## 2025-03-30 VITALS
WEIGHT: 312 LBS | RESPIRATION RATE: 20 BRPM | BODY MASS INDEX: 44.67 KG/M2 | SYSTOLIC BLOOD PRESSURE: 146 MMHG | DIASTOLIC BLOOD PRESSURE: 69 MMHG | OXYGEN SATURATION: 97 % | TEMPERATURE: 98.1 F | HEIGHT: 70 IN | HEART RATE: 74 BPM

## 2025-03-30 LAB
ALBUMIN SERPL BCP-MCNC: 3 G/DL (ref 3.4–5)
ANION GAP SERPL CALC-SCNC: 13 MMOL/L (ref 10–20)
BACTERIA BLD CULT: NORMAL
BACTERIA BLD CULT: NORMAL
BACTERIA UR CULT: ABNORMAL
BASOPHILS # BLD AUTO: 0.04 X10*3/UL (ref 0–0.1)
BASOPHILS NFR BLD AUTO: 0.4 %
BUN SERPL-MCNC: 20 MG/DL (ref 6–23)
CALCIUM SERPL-MCNC: 8.4 MG/DL (ref 8.6–10.3)
CHLORIDE SERPL-SCNC: 100 MMOL/L (ref 98–107)
CO2 SERPL-SCNC: 24 MMOL/L (ref 21–32)
CREAT SERPL-MCNC: 1.21 MG/DL (ref 0.5–1.3)
CRP SERPL-MCNC: 18.63 MG/DL
EGFRCR SERPLBLD CKD-EPI 2021: 68 ML/MIN/1.73M*2
EOSINOPHIL # BLD AUTO: 0.19 X10*3/UL (ref 0–0.7)
EOSINOPHIL NFR BLD AUTO: 1.9 %
ERYTHROCYTE [DISTWIDTH] IN BLOOD BY AUTOMATED COUNT: 17.2 % (ref 11.5–14.5)
GLUCOSE SERPL-MCNC: 160 MG/DL (ref 74–99)
HCT VFR BLD AUTO: 34.6 % (ref 41–52)
HGB BLD-MCNC: 10.5 G/DL (ref 13.5–17.5)
IMM GRANULOCYTES # BLD AUTO: 0.11 X10*3/UL (ref 0–0.7)
IMM GRANULOCYTES NFR BLD AUTO: 1.1 % (ref 0–0.9)
INR PPP: 1.3 (ref 0.9–1.1)
LYMPHOCYTES # BLD AUTO: 1.22 X10*3/UL (ref 1.2–4.8)
LYMPHOCYTES NFR BLD AUTO: 11.9 %
MAGNESIUM SERPL-MCNC: 2.03 MG/DL (ref 1.6–2.4)
MCH RBC QN AUTO: 27.5 PG (ref 26–34)
MCHC RBC AUTO-ENTMCNC: 30.3 G/DL (ref 32–36)
MCV RBC AUTO: 91 FL (ref 80–100)
MONOCYTES # BLD AUTO: 1.19 X10*3/UL (ref 0.1–1)
MONOCYTES NFR BLD AUTO: 11.6 %
NEUTROPHILS # BLD AUTO: 7.48 X10*3/UL (ref 1.2–7.7)
NEUTROPHILS NFR BLD AUTO: 73.1 %
NRBC BLD-RTO: 0 /100 WBCS (ref 0–0)
PHOSPHATE SERPL-MCNC: 3.5 MG/DL (ref 2.5–4.9)
PLATELET # BLD AUTO: 204 X10*3/UL (ref 150–450)
POTASSIUM SERPL-SCNC: 4 MMOL/L (ref 3.5–5.3)
PROCALCITONIN SERPL-MCNC: 0.78 NG/ML
PROCALCITONIN SERPL-MCNC: 1.68 NG/ML
PROTHROMBIN TIME: 14 SECONDS (ref 9.8–12.4)
RBC # BLD AUTO: 3.82 X10*6/UL (ref 4.5–5.9)
SODIUM SERPL-SCNC: 133 MMOL/L (ref 136–145)
WBC # BLD AUTO: 10.2 X10*3/UL (ref 4.4–11.3)

## 2025-03-30 PROCEDURE — 80069 RENAL FUNCTION PANEL: CPT | Performed by: STUDENT IN AN ORGANIZED HEALTH CARE EDUCATION/TRAINING PROGRAM

## 2025-03-30 PROCEDURE — 1100000001 HC PRIVATE ROOM DAILY

## 2025-03-30 PROCEDURE — 85610 PROTHROMBIN TIME: CPT | Performed by: STUDENT IN AN ORGANIZED HEALTH CARE EDUCATION/TRAINING PROGRAM

## 2025-03-30 PROCEDURE — 94760 N-INVAS EAR/PLS OXIMETRY 1: CPT

## 2025-03-30 PROCEDURE — 99222 1ST HOSP IP/OBS MODERATE 55: CPT | Performed by: SURGERY

## 2025-03-30 PROCEDURE — 85025 COMPLETE CBC W/AUTO DIFF WBC: CPT | Performed by: STUDENT IN AN ORGANIZED HEALTH CARE EDUCATION/TRAINING PROGRAM

## 2025-03-30 PROCEDURE — 99232 SBSQ HOSP IP/OBS MODERATE 35: CPT | Performed by: INTERNAL MEDICINE

## 2025-03-30 PROCEDURE — 2500000001 HC RX 250 WO HCPCS SELF ADMINISTERED DRUGS (ALT 637 FOR MEDICARE OP): Performed by: STUDENT IN AN ORGANIZED HEALTH CARE EDUCATION/TRAINING PROGRAM

## 2025-03-30 PROCEDURE — 2500000004 HC RX 250 GENERAL PHARMACY W/ HCPCS (ALT 636 FOR OP/ED): Performed by: STUDENT IN AN ORGANIZED HEALTH CARE EDUCATION/TRAINING PROGRAM

## 2025-03-30 PROCEDURE — 86140 C-REACTIVE PROTEIN: CPT | Performed by: STUDENT IN AN ORGANIZED HEALTH CARE EDUCATION/TRAINING PROGRAM

## 2025-03-30 PROCEDURE — 83735 ASSAY OF MAGNESIUM: CPT | Performed by: STUDENT IN AN ORGANIZED HEALTH CARE EDUCATION/TRAINING PROGRAM

## 2025-03-30 PROCEDURE — 2500000002 HC RX 250 W HCPCS SELF ADMINISTERED DRUGS (ALT 637 FOR MEDICARE OP, ALT 636 FOR OP/ED): Performed by: INTERNAL MEDICINE

## 2025-03-30 PROCEDURE — 84145 PROCALCITONIN (PCT): CPT | Mod: GEALAB | Performed by: STUDENT IN AN ORGANIZED HEALTH CARE EDUCATION/TRAINING PROGRAM

## 2025-03-30 PROCEDURE — 36415 COLL VENOUS BLD VENIPUNCTURE: CPT | Performed by: STUDENT IN AN ORGANIZED HEALTH CARE EDUCATION/TRAINING PROGRAM

## 2025-03-30 RX ORDER — WARFARIN 3 MG/1
4.5 TABLET ORAL DAILY
Status: DISCONTINUED | OUTPATIENT
Start: 2025-03-30 | End: 2025-03-31 | Stop reason: HOSPADM

## 2025-03-30 RX ADMIN — ACETAMINOPHEN 975 MG: 325 TABLET ORAL at 13:09

## 2025-03-30 RX ADMIN — CEFTRIAXONE SODIUM 2 G: 2 INJECTION, SOLUTION INTRAVENOUS at 20:34

## 2025-03-30 RX ADMIN — POLYETHYLENE GLYCOL 3350 17 G: 17 POWDER, FOR SOLUTION ORAL at 15:54

## 2025-03-30 RX ADMIN — DILTIAZEM HYDROCHLORIDE 120 MG: 120 CAPSULE, COATED, EXTENDED RELEASE ORAL at 08:04

## 2025-03-30 RX ADMIN — Medication 5 MG: at 20:34

## 2025-03-30 RX ADMIN — METHOCARBAMOL TABLETS 1000 MG: 500 TABLET, COATED ORAL at 05:43

## 2025-03-30 RX ADMIN — ACETAMINOPHEN 975 MG: 325 TABLET ORAL at 20:34

## 2025-03-30 RX ADMIN — HEPARIN SODIUM 7500 UNITS: 5000 INJECTION, SOLUTION INTRAVENOUS; SUBCUTANEOUS at 13:09

## 2025-03-30 RX ADMIN — Medication 2000 UNITS: at 08:03

## 2025-03-30 RX ADMIN — METHOCARBAMOL TABLETS 1000 MG: 500 TABLET, COATED ORAL at 13:09

## 2025-03-30 RX ADMIN — ACETAMINOPHEN 975 MG: 325 TABLET ORAL at 04:50

## 2025-03-30 RX ADMIN — HEPARIN SODIUM 7500 UNITS: 5000 INJECTION, SOLUTION INTRAVENOUS; SUBCUTANEOUS at 05:43

## 2025-03-30 RX ADMIN — HEPARIN SODIUM 7500 UNITS: 5000 INJECTION, SOLUTION INTRAVENOUS; SUBCUTANEOUS at 20:34

## 2025-03-30 RX ADMIN — METHOCARBAMOL TABLETS 1000 MG: 500 TABLET, COATED ORAL at 20:34

## 2025-03-30 RX ADMIN — OXYCODONE HYDROCHLORIDE 10 MG: 10 TABLET ORAL at 23:27

## 2025-03-30 RX ADMIN — WARFARIN SODIUM 4.5 MG: 3 TABLET ORAL at 18:01

## 2025-03-30 RX ADMIN — SENNOSIDES AND DOCUSATE SODIUM 2 TABLET: 50; 8.6 TABLET ORAL at 20:34

## 2025-03-30 ASSESSMENT — PAIN SCALES - GENERAL
PAINLEVEL_OUTOF10: 7
PAINLEVEL_OUTOF10: 0 - NO PAIN
PAINLEVEL_OUTOF10: 0 - NO PAIN

## 2025-03-30 ASSESSMENT — COGNITIVE AND FUNCTIONAL STATUS - GENERAL
MOBILITY SCORE: 24
DAILY ACTIVITIY SCORE: 24

## 2025-03-30 ASSESSMENT — PAIN - FUNCTIONAL ASSESSMENT: PAIN_FUNCTIONAL_ASSESSMENT: 0-10

## 2025-03-30 ASSESSMENT — PAIN DESCRIPTION - LOCATION: LOCATION: GENERALIZED

## 2025-03-30 ASSESSMENT — PAIN SCALES - WONG BAKER: WONGBAKER_NUMERICALRESPONSE: HURTS WHOLE LOT

## 2025-03-30 NOTE — CONSULTS
General Surgery History and Physical    Referring Provider:  Edilma Luna MD  No ref. provider found     Chief Complaint:  No chief complaint on file.      History of Present Illness:  Hugo Gauthier is a 64 y.o. male who   PMH DVT, chronic LLE wound, chronic venous stasis, CAD, HTN, HFpEF, chronic LBP, RADHA (not on PAP therapy), former smoker and Class III obesity   Was admitted to medicine for right pyelonephritis with severe R hydronephrosis due to obstructive renal stone.  He underwent cystoscopy, ureteral stent placement yesterday   On CT scan:   Liquid stool at the cecum, ascending colon and the transverse  colon, may be seen with infectious/inflammatory colitis.  Mildly dilated, fluid-filled, appendix, may be secondary to the  presence of liquid stool at the cecum. Clinical correlation  recommended to exclude early/developing acute appendicitis.    Surgery was consulted to rule out appendicitis   When I saw him this morning, he denies RLQ pain. He ate breakfast with no problem   Diarrhea better after Urology procedure     Past Medical History:  Past Medical History:   Diagnosis Date    Arrhythmia     Clotting disorder (Multi)     Coronary artery disease involving native coronary artery of native heart 10/20/2023    Disease of thyroid gland     Hypertension     Personal history of diseases of the blood and blood-forming organs and certain disorders involving the immune mechanism 10/19/2021    History of iron deficiency anemia    Personal history of diseases of the blood and blood-forming organs and certain disorders involving the immune mechanism 10/19/2021    History of anemia        Past Surgical History:  Past Surgical History:   Procedure Laterality Date    CARDIAC CATHETERIZATION N/A 10/13/2023    Procedure: Left Heart Cath;  Surgeon: Jude Lamas MD;  Location: [facility] Cardiac [facility];  Service: Cardiovascular;  Laterality: N/A;  PACE ORDERING / MORALES DOING        Medications:  Current Outpatient  Medications   Medication Instructions    alpha tocopherol (Vitamin E) 268 mg (400 unit) capsule Take by mouth.    ascorbic acid (Vitamin C) 1,000 mg tablet 1 tablet, Daily    cholecalciferol (Vitamin D-3) 50 MCG (2000 UT) tablet 1 tablet, Daily    dilTIAZem CD (Cardizem CD) 120 mg 24 hr capsule TAKE 1 CAPSULE(120 MG) BY MOUTH EVERY DAY    folic acid (Folvite) 800 mcg tablet 1 tablet, Daily    iodine, bulk, crystals USE AS DIRECTED.    ketoconazole (NIZOral) 2 % cream Topical, 2 times daily    lisinopril 20 mg, oral, Daily    magnesium oxide (Mag-Ox) 400 mg tablet 1 tablet, Daily    nystatin (Mycostatin) cream APPLY TOPICALLY TO AFFECTED AREA TWICE DAILY FOR 7 DAYS    SantyL 250 unit/gram ointment     selenium 200 mcg tablet 1 tablet, Daily    sildenafil (VIAGRA) 100 mg, oral, Daily PRN    silver sulfADIAZINE (Silvadene) 1 % cream 1 Application, Topical, 2 times daily    spironolactone (ALDACTONE) 25 mg, oral, Daily    triamcinolone (Kenalog) 0.1 % cream Topical, 2 times daily, Apply to affected area 1-2 times daily as needed.    warfarin (Coumadin) 4 mg tablet TAKE 1 TABLET EVERY DAY    warfarin (COUMADIN) 1 mg, oral    zinc gluconate 100 mg, oral, Daily        Allergies:  No Known Allergies     Family History:  No family history on file.     Social History:  Social History     Socioeconomic History    Marital status:    Tobacco Use    Smoking status: Former     Current packs/day: 1.00     Average packs/day: 1 pack/day for 20.0 years (20.0 ttl pk-yrs)     Types: Cigarettes     Passive exposure: Never    Smokeless tobacco: Never   Vaping Use    Vaping status: Never Used   Substance and Sexual Activity    Alcohol use: Not Currently    Drug use: Not Currently    Sexual activity: Defer     Social Drivers of Health     Financial Resource Strain: Low Risk  (3/29/2025)    Overall Financial Resource Strain (CARDIA)     Difficulty of Paying Living Expenses: Not hard at all   Food Insecurity: No Food Insecurity  (3/29/2025)    Hunger Vital Sign     Worried About Running Out of Food in the Last Year: Never true     Ran Out of Food in the Last Year: Never true   Transportation Needs: No Transportation Needs (3/29/2025)    PRAPARE - Transportation     Lack of Transportation (Medical): No     Lack of Transportation (Non-Medical): No   Intimate Partner Violence: Not At Risk (3/29/2025)    Humiliation, Afraid, Rape, and Kick questionnaire     Fear of Current or Ex-Partner: No     Emotionally Abused: No     Physically Abused: No     Sexually Abused: No   Housing Stability: Low Risk  (3/29/2025)    Housing Stability Vital Sign     Unable to Pay for Housing in the Last Year: No     Number of Times Moved in the Last Year: 0     Homeless in the Last Year: No        Review of Systems:  A complete 12 point review of systems was performed and is negative except as noted in the history of present illness.      Vital Signs:  Vitals:    03/30/25 0806   BP: 143/65   Pulse: 66   Resp: 18   Temp: 36.5 °C (97.7 °F)   SpO2: 97%      Body mass index is 44.77 kg/m².    Physical Exam:  General: No acute distress.  Neuro: Alert and oriented ×3. Follows commands.  Head: Atraumatic  Eyes: Pupils equal reactive to light. Extraocular motions intact.  Ears: Hears normal speaking voice.  Mouth, Nose, Throat: Mucous membranes moist.   Neck: Supple. No visible masses.  Breast: not examined.   Chest: No crepitus.   Lung: Patent airway. Breathing not labored.   Vascular: Palpable radial pulses bilaterally.  Abdomen: Soft. Obese, Nontender.   Rectal: Not examined.   Genitourinary: Not examined.   Musculoskeletal: Moves all extremities.    Extremities: No cyanosis.   Lymphatic: No palpable lymph nodes.  Skin: No rashes or lesions.  Psychological: Normal affect      Laboratory Values:  CBC:   Lab Results   Component Value Date    WBC 10.2 03/30/2025    RBC 3.82 (L) 03/30/2025    HGB 10.5 (L) 03/30/2025    HCT 34.6 (L) 03/30/2025     03/30/2025       RFP:    Lab Results   Component Value Date     (L) 03/30/2025    K 4.0 03/30/2025     03/30/2025    CO2 24 03/30/2025    BUN 20 03/30/2025    CREATININE 1.21 03/30/2025    CALCIUM 8.4 (L) 03/30/2025    MG 2.03 03/30/2025    PHOS 3.5 03/30/2025        LFTs:   Lab Results   Component Value Date    ALBUMIN 3.0 (L) 03/30/2025            Imaging:  I have personally reviewed the images and the radiologist's report.  FL less than 1 hour    Result Date: 3/29/2025  These images are not reportable by radiology and will not be interpreted by  Radiologists.    CT abdomen pelvis w IV contrast    Result Date: 3/29/2025  Interpreted By:  Laurita Meng, STUDY: CT ABDOMEN PELVIS W IV CONTRAST;  3/28/2025 11:15 pm   INDICATION: Signs/Symptoms:abdominal pain generalized.   COMPARISON: CT abdomen and pelvis 08/21/2023.   ACCESSION NUMBER(S): [id number]   ORDERING CLINICIAN: STEWART GARCIA   TECHNIQUE: Axial CT of the abdomen and pelvis was performed. Coronal and sagittal reconstructions were performed.   Intravenous contrast material was administered without immediate complication.   FINDINGS: LOWER CHEST: No consolidation or pleural effusion.   ABDOMEN:   LIVER: No focal lesion is identified.   BILE DUCTS: No significant dilation.   GALLBLADDER: Contracted.   PANCREAS: No peripancreatic inflammatory changes.   SPLEEN: Unremarkable.   ADRENAL GLANDS: Unremarkable.   KIDNEYS AND URETERS: There is right-sided perinephric stranding and small amount of perinephric fluid. There is severe right hydronephrosis. There is a 1.6 x 1.1 x 2.8 cm (AP by transverse by craniocaudal diameter) calculus at the proximal right ureter. Multiple calculi are noted at the right kidney, the largest at the inferior pole measures 3.8 cm. No left hydronephrosis or hydroureter. There is a 0.9 cm calculus at the inferior pole of the left kidney.   PELVIS:   BLADDER: Distended.   REPRODUCTIVE ORGANS: The prostate measures 4.8 cm in transverse  diameter.   BOWEL: The stomach is mostly decompressed. No bowel obstruction. There is colonic diverticulosis with no CT evidence for acute diverticulitis. There is liquid stool at the cecum, ascending colon and the transverse colon. The appendix is fluid-filled and mildly dilated to 10 mm.   VESSELS: Atherosclerotic calcifications within the abdominal aorta and its branches. No abdominal aortic aneurysm. Vascular stent noted at the right common iliac and external iliac veins.   PERITONEUM/RETROPERITONEUM/LYMPH NODES: No free fluid or free air.  No retroperitoneal hemorrhage. Interval development of mild retroperitoneal adenopathy. A retrocaval lymph node measures 1.4 cm in short axis.   BONES AND ABDOMINAL WALL: Multilevel degenerative changes are noted in the spine. Severe degenerative changes at the right hip with loss of joint space, subchondral sclerosis and cyst formation. Moderate degenerative changes at the left hip. There is a small fat containing umbilical hernia. Enlarged lymph nodes are noted at the bilateral inguinal regions measuring 1.5 cm in short axis on the right (previously measured 1.6 cm on CT 08/21/2023) and 1.9 cm in short axis on the left (previously measured 2.6 cm on CT 08/21/2023).       1. Right-sided obstructive uropathy with severe right hydronephrosis and with a 1.6 x 1.1 x 2.8 cm calculus at the proximal right ureter. 2. Mild right perinephric stranding and trace amount of perinephric fluid, may be secondary to the above findings. Please correlate with laboratory values/urinalysis to exclude superimposed acute pyelonephritis. 3. Bilateral nephrolithiasis. 4. Interval development of mild retroperitoneal adenopathy. Evaluation with nonemergent scrotal ultrasound recommended to exclude testicular pathology. 5. Liquid stool at the cecum, ascending colon and the transverse colon, may be seen with infectious/inflammatory colitis. 6. Mildly dilated, fluid-filled, appendix, may be secondary to  the presence of liquid stool at the cecum. Clinical correlation recommended to exclude early/developing acute appendicitis. 7. Colonic diverticulosis. 8. Mild bilateral inguinal adenopathy, left more than right, mildly decreased since prior CT 08/21/2023.           MACRO: None.   Signed by: Laurita Meng 3/29/2025 12:13 AM Dictation workstation:   [id number]        Assessment:  This is a 64 y.o. male who has below conditions:  S/P cystoscopy and ureteral stent placement for right pyelonephritis with severe R hydronephrosis due to obstructive renal stone.  ? Colitis   Mildly dilated, fluid-filled, appendix    Plan:  Supportive care per medicine   Not consistent with appendicitis   Consult GI for ?colitis   No surgical intervention needed   May repeat CT scan if RLQ pain   Ok to continue diet   Call if any questions or new concern       Boris Griffith MD [facility]  General Surgery  Office: [phone]  Fax:     [phone]  9:18 AM   03/30/25

## 2025-03-30 NOTE — PROGRESS NOTES
03/30/25 1336   Discharge Planning   Living Arrangements Spouse/significant other   Support Systems Spouse/significant other   Assistance Needed patient is from home with spouse, A&Ox3, independent in ADL's, uses a cane PRN, no DME, drives PCP Dr. Luna Patient requesting return to work note from provider   Type of Residence Private residence   Number of Stairs to Enter Residence 5   Number of Stairs Within Residence 12   Do you have animals or pets at home? Yes   Type of Animals or Pets outside cats, sheep   Who is requesting discharge planning? Provider   Home or Post Acute Services None   Expected Discharge Disposition Home  (denies home going needs other than return to work note)   Does the patient need discharge transport arranged? No

## 2025-03-30 NOTE — CARE PLAN
The patient's goals for the shift include      The clinical goals for the shift include VS wnl through the night

## 2025-03-30 NOTE — PROGRESS NOTES
[facility] Hospitalist Progress Note       [phone]: Please page me for patient care issues.  [phone]: Please page night hospitalist for any issues.     Hugo Gauthier  :  1960(64 y.o.)  MRN:  [id number]  PCP: Edilma Luna MD  LOS: 1  day(s) since admission    Assessment & Plan  Pyelonephritis    Right kidney stone      Assessment and Plan:     Hugo Gauthier is a 64 y.o. male with PMH DVT, chronic LLE wound, chronic venous stasis, CAD, HTN, HFpEF, chronic LBP, RADHA (not on PAP therapy), former smoker and Class III obesity presented to [place] ED with 1 week of worsening generalized abdominal pain associated with diarrhea and urinary incontinence, subjective fever, rigor, diaphoresis, anorexia and lethargy. Denies nausea or emesis. Patient reported stopped taking his medications during his illness     In ED VSS. Labs was significant for hyponatremia (130), azotemia (22/1.36), hypoalbuminemia (3.1), subtherapeutic INR (1.5), leukocytosis (12.9), negative Flu/COVID-19,UA positive for pyuria and hematuria. CT A/P was positive for severe right hydronephrosis due to obstructive renal calculus (1.6 x 1.1 x 2.8 cm ), B/L nephrolithiasis, right perinephric stranding, mild retroperitoneal adenopath, Mildly dilated, fluid-filled, appendix, may be secondary to the presence of liquid stool at the cecum.     Patient was admitted for right pyelonephritis with severe R hydronephrosis due to obstructive renal stone.     #Right pyelonephritis  #Severe R hydronephrosis due to obstructive renal stone  #OLIMPIA, improving  #Mildly dilated, fluid-filled, appendix w/o s/o appendicitis  #B/L nephrolithiasis  #Chronic HFpEF  #CAD  #RADHA (not on PAP therapy)  #Chronic venous stasis  #Class III obesity is 44.77 kg/m² with serious co morbidities  -s/p R ureteral stent (3/29/25)  -Ceftriaxone pending Cx  -IVF w/ caution 2/2 CHF, analgesics  -awaiting uro eval and intervention  -permissive HTN, home antihypertensives 2/2 OLIMPIA  -resume  rest of home meds as indicated  -CS notes reviewed and recs appreciated: urology, surgery     Disposition: await test results, await consultant recommendations, and await clinical improvement    DVT Prophylaxis: Subq Heparin    Code status: Full Code  Diet: Adult diet Regular    Level of MDM:  High    discussed with nurse, discussed with TCC/SW, patient and family updated, I personally examined the patient, and I personally reviewed chart, data, labs radiology reports    Family Communication  Number :   Name of Designated Family Representative:       Total time spent: 35 minutes, of total time providing counseling or in coordination of care. Total time on this day of visit includes record and documentation review before and after visit including documentation and time not explicitly included on EMR time stamp      Subjective:   Interval History:  HPI  The patient complains of abdominal pain  The patient feels their symptoms areimproving  no events or new concerns    Scheduled Meds:acetaminophen, 975 mg, oral, q8h  cefTRIAXone, 2 g, intravenous, q24h  cholecalciferol, 2,000 Units, oral, Daily  dilTIAZem CD, 120 mg, oral, Daily  heparin (porcine), 7,500 Units, subcutaneous, q8h LANDEN  [Held by provider] lisinopril, 20 mg, oral, Daily  melatonin, 5 mg, oral, Nightly  methocarbamol, 1,000 mg, oral, q8h LANDEN  pantoprazole, 40 mg, oral, Daily before breakfast   Or  pantoprazole, 40 mg, intravenous, Daily before breakfast  polyethylene glycol, 17 g, oral, Daily  sennosides-docusate sodium, 2 tablet, oral, BID  [Held by provider] spironolactone, 25 mg, oral, Daily  [Held by provider] warfarin, 4 mg, oral, Daily      Continuous Infusions:   PRN Meds:PRN medications: dextromethorphan-guaifenesin, morphine, naloxone, ondansetron, oxyCODONE **OR** oxyCODONE, prochlorperazine    Review of Systems   All other systems reviewed and are negative.    Interval Pertinent History:  Social History     Tobacco Use    Smoking status: Former  "    Current packs/day: 1.00     Average packs/day: 1 pack/day for 20.0 years (20.0 ttl pk-yrs)     Types: Cigarettes     Passive exposure: Never    Smokeless tobacco: Never   Substance Use Topics    Alcohol use: Not Currently         Objective:   Patient Vitals for the past 24 hrs:   BP Temp Temp src Pulse Resp SpO2 Height Weight   25 0806 143/65 36.5 °C (97.7 °F) Temporal 66 18 97 % 1.778 m (5' 10\") 142 kg (312 lb)   25 0734 143/65 36.5 °C (97.7 °F) Temporal 66 19 97 % -- --   25 0436 137/71 36.8 °C (98.2 °F) Temporal 69 -- 98 % -- --   25 2020 120/65 36.8 °C (98.2 °F) Temporal 77 18 98 % -- --   25 1600 -- -- -- -- -- -- -- 142 kg (312 lb)   25 1503 122/59 -- -- 72 15 100 % -- --   25 1448 122/58 -- -- 76 20 97 % -- --   25 1433 123/58 36.8 °C (98.2 °F) Temporal 83 (!) 31 94 % -- --   25 1154 126/65 36.7 °C (98.1 °F) Temporal 85 19 95 % -- --       Average, Min, and Max for last 24 hours Vitals:  TEMPERATURE:  Temp  Av.7 °C (98 °F)  Min: 36.5 °C (97.7 °F)  Max: 36.8 °C (98.2 °F)    RESPIRATIONS RANGE: Resp  Av  Min: 15  Max: 31    PULSE RANGE: Pulse  Av.3  Min: 66  Max: 85    BLOOD PRESSURE RANGE:  Systolic (24hrs), Av , Min:120 , Max:143   ; Diastolic (24hrs), Av, Min:58, Max:71      PULSE OXIMETRY RANGE: SpO2  Av %  Min: 94 %  Max: 100 %  Body mass index is 44.77 kg/m².    I/O last 3 completed shifts:  In: 1740 (12.3 mL/kg) [P.O.:390; I.V.:700 (4.9 mL/kg); IV Piggyback:650]  Out: 1250 (8.8 mL/kg) [Urine:1250 (0.2 mL/kg/hr)]  Weight: 141.5 kg   Weight change:      Physical Exam  Vitals and nursing note reviewed.   Constitutional:       General: He is not in acute distress.     Appearance: Normal appearance. He is not ill-appearing.   HENT:      Head: Normocephalic and atraumatic.      Right Ear: External ear normal.      Left Ear: External ear normal.      Nose: Nose normal. No congestion or rhinorrhea.      Mouth/Throat:      " Mouth: Mucous membranes are moist.   Eyes:      Extraocular Movements: Extraocular movements intact.      Pupils: Pupils are equal, round, and reactive to light.   Cardiovascular:      Rate and Rhythm: Normal rate and regular rhythm.      Pulses: Normal pulses.      Heart sounds: Normal heart sounds.   Pulmonary:      Effort: Pulmonary effort is normal.      Breath sounds: Normal breath sounds.   Abdominal:      General: Abdomen is flat. Bowel sounds are normal.      Palpations: Abdomen is soft.      Tenderness: There is right CVA tenderness (mild).   Genitourinary:     Comments: +FC with cloudy colored urine  Musculoskeletal:         General: Normal range of motion.      Cervical back: Normal range of motion and neck supple.      Right lower leg: No edema.      Left lower leg: No edema.   Skin:     General: Skin is warm and dry.      Capillary Refill: Capillary refill takes less than 2 seconds.   Neurological:      General: No focal deficit present.      Mental Status: He is alert and oriented to person, place, and time. Mental status is at baseline.   Psychiatric:         Mood and Affect: Mood normal.         Thought Content: Thought content normal.         Judgment: Judgment normal.         Lab Results   Component Value Date     (L) 03/30/2025    K 4.0 03/30/2025     03/30/2025    CO2 24 03/30/2025    BUN 20 03/30/2025    CREATININE 1.21 03/30/2025    GLUCOSE 160 (H) 03/30/2025    CALCIUM 8.4 (L) 03/30/2025    PROT 6.8 03/28/2025    BILITOT 1.1 03/28/2025    ALKPHOS 65 03/28/2025    AST 22 03/28/2025    ALT 24 03/28/2025       Lab Results   Component Value Date    WBC 10.2 03/30/2025    HGB 10.5 (L) 03/30/2025    HCT 34.6 (L) 03/30/2025    MCV 91 03/30/2025     03/30/2025    LYMPHOPCT 11.9 03/30/2025    RBC 3.82 (L) 03/30/2025    MCH 27.5 03/30/2025    MCHC 30.3 (L) 03/30/2025    RDW 17.2 (H) 03/30/2025    CRP 18.63 (H) 03/30/2025       Lab Results   Component Value Date    TSH 0.86 07/29/2024      Lab Results   Component Value Date    LACTATE 0.8 03/29/2025    BNP 22 01/27/2025    INR 1.3 (H) 03/30/2025       IMAGES:  Encounter Date: 01/27/25   ECG 12 lead (Clinic Performed)   Result Value    Ventricular Rate 68    Atrial Rate 68    NY Interval 188    QRS Duration 94    QT Interval 360    QTC Calculation(Bazett) 382    P Axis 63    R Axis 28    T Axis 34    QRS Count 11    Q Onset 222    P Onset 128    P Offset 193    T Offset 402    QTC Fredericia 375    Narrative    Normal sinus rhythm  Normal ECG  When compared with ECG of 25-SEP-2023 09:41,  QT has shortened  Confirmed by Gorge Medeiros ([id number]) on 1/27/2025 1:24:11 PM        No echocardiogram results found for the past 12 months  === 01/27/25 ===    XR SHOULDER 2+ VIEWS RIGHT    - Impression -  No acute findings.    MACRO:  None    Signed by: Huog Dobbs 1/29/2025 11:39 AM  Dictation workstation:   [id number]  === 03/28/25 ===    CT ABDOMEN PELVIS W IV CONTRAST    - Impression -  1. Right-sided obstructive uropathy with severe right hydronephrosis  and with a 1.6 x 1.1 x 2.8 cm calculus at the proximal right ureter.  2. Mild right perinephric stranding and trace amount of perinephric  fluid, may be secondary to the above findings. Please correlate with  laboratory values/urinalysis to exclude superimposed acute  pyelonephritis.  3. Bilateral nephrolithiasis.  4. Interval development of mild retroperitoneal adenopathy.  Evaluation with nonemergent scrotal ultrasound recommended to exclude  testicular pathology.  5. Liquid stool at the cecum, ascending colon and the transverse  colon, may be seen with infectious/inflammatory colitis.  6. Mildly dilated, fluid-filled, appendix, may be secondary to the  presence of liquid stool at the cecum. Clinical correlation  recommended to exclude early/developing acute appendicitis.  7. Colonic diverticulosis.  8. Mild bilateral inguinal adenopathy, left more than right, mildly  decreased since prior CT  08/21/2023.            MACRO:  None.    Signed by: Laurita Meng 3/29/2025 12:13 AM  Dictation workstation:   [id number]  === 01/27/25 ===    XR SHOULDER 2+ VIEWS RIGHT    - Impression -  No acute findings.    MACRO:  None    Signed by: Hugo Dobbs 1/29/2025 11:39 AM  Dictation workstation:   [id number]      Additional results of the last 24 hours have been reviewed.    Dictated using [employer] Version 2.4  Proof read however unrecognized voice recognition errors may have occurred     Electronically signed by Louise Varma DO on 03/30/25 at 8:58 AM

## 2025-03-31 ENCOUNTER — PHARMACY VISIT (OUTPATIENT)
Dept: PHARMACY | Facility: CLINIC | Age: 65
End: 2025-03-31
Payer: COMMERCIAL

## 2025-03-31 VITALS
OXYGEN SATURATION: 94 % | BODY MASS INDEX: 44.67 KG/M2 | TEMPERATURE: 97.7 F | HEART RATE: 70 BPM | DIASTOLIC BLOOD PRESSURE: 82 MMHG | HEIGHT: 70 IN | SYSTOLIC BLOOD PRESSURE: 147 MMHG | RESPIRATION RATE: 18 BRPM | WEIGHT: 312 LBS

## 2025-03-31 LAB
ALBUMIN SERPL BCP-MCNC: 2.9 G/DL (ref 3.4–5)
ANION GAP SERPL CALC-SCNC: 13 MMOL/L (ref 10–20)
BACTERIA UR CULT: ABNORMAL
BACTERIA UR CULT: NORMAL
BASOPHILS # BLD AUTO: 0.05 X10*3/UL (ref 0–0.1)
BASOPHILS NFR BLD AUTO: 0.6 %
BUN SERPL-MCNC: 19 MG/DL (ref 6–23)
CALCIUM SERPL-MCNC: 8.2 MG/DL (ref 8.6–10.3)
CHLORIDE SERPL-SCNC: 104 MMOL/L (ref 98–107)
CO2 SERPL-SCNC: 25 MMOL/L (ref 21–32)
CREAT SERPL-MCNC: 1.09 MG/DL (ref 0.5–1.3)
CRP SERPL-MCNC: 11.64 MG/DL
EGFRCR SERPLBLD CKD-EPI 2021: 77 ML/MIN/1.73M*2
EOSINOPHIL # BLD AUTO: 0.31 X10*3/UL (ref 0–0.7)
EOSINOPHIL NFR BLD AUTO: 3.8 %
ERYTHROCYTE [DISTWIDTH] IN BLOOD BY AUTOMATED COUNT: 17.2 % (ref 11.5–14.5)
GLUCOSE SERPL-MCNC: 157 MG/DL (ref 74–99)
HCT VFR BLD AUTO: 35.7 % (ref 41–52)
HGB BLD-MCNC: 10.6 G/DL (ref 13.5–17.5)
HYPOCHROMIA BLD QL SMEAR: NORMAL
IMM GRANULOCYTES # BLD AUTO: 0.08 X10*3/UL (ref 0–0.7)
IMM GRANULOCYTES NFR BLD AUTO: 1 % (ref 0–0.9)
INR PPP: 1.2 (ref 0.9–1.1)
LYMPHOCYTES # BLD AUTO: 1.76 X10*3/UL (ref 1.2–4.8)
LYMPHOCYTES NFR BLD AUTO: 21.6 %
MAGNESIUM SERPL-MCNC: 1.92 MG/DL (ref 1.6–2.4)
MCH RBC QN AUTO: 27.2 PG (ref 26–34)
MCHC RBC AUTO-ENTMCNC: 29.7 G/DL (ref 32–36)
MCV RBC AUTO: 92 FL (ref 80–100)
MONOCYTES # BLD AUTO: 0.75 X10*3/UL (ref 0.1–1)
MONOCYTES NFR BLD AUTO: 9.2 %
NEUTROPHILS # BLD AUTO: 5.18 X10*3/UL (ref 1.2–7.7)
NEUTROPHILS NFR BLD AUTO: 63.8 %
NRBC BLD-RTO: 0 /100 WBCS (ref 0–0)
PHOSPHATE SERPL-MCNC: 4.2 MG/DL (ref 2.5–4.9)
PLATELET # BLD AUTO: 247 X10*3/UL (ref 150–450)
POTASSIUM SERPL-SCNC: 4.3 MMOL/L (ref 3.5–5.3)
PROCALCITONIN SERPL-MCNC: 0.48 NG/ML
PROTHROMBIN TIME: 13.1 SECONDS (ref 9.8–12.4)
RBC # BLD AUTO: 3.89 X10*6/UL (ref 4.5–5.9)
RBC MORPH BLD: NORMAL
SODIUM SERPL-SCNC: 138 MMOL/L (ref 136–145)
WBC # BLD AUTO: 8.1 X10*3/UL (ref 4.4–11.3)

## 2025-03-31 PROCEDURE — 99232 SBSQ HOSP IP/OBS MODERATE 35: CPT | Performed by: SURGERY

## 2025-03-31 PROCEDURE — 97161 PT EVAL LOW COMPLEX 20 MIN: CPT | Mod: GP

## 2025-03-31 PROCEDURE — 86140 C-REACTIVE PROTEIN: CPT | Performed by: STUDENT IN AN ORGANIZED HEALTH CARE EDUCATION/TRAINING PROGRAM

## 2025-03-31 PROCEDURE — 99238 HOSP IP/OBS DSCHRG MGMT 30/<: CPT | Performed by: NURSE PRACTITIONER

## 2025-03-31 PROCEDURE — 84145 PROCALCITONIN (PCT): CPT | Mod: GEALAB | Performed by: STUDENT IN AN ORGANIZED HEALTH CARE EDUCATION/TRAINING PROGRAM

## 2025-03-31 PROCEDURE — 2500000001 HC RX 250 WO HCPCS SELF ADMINISTERED DRUGS (ALT 637 FOR MEDICARE OP): Performed by: STUDENT IN AN ORGANIZED HEALTH CARE EDUCATION/TRAINING PROGRAM

## 2025-03-31 PROCEDURE — 2500000004 HC RX 250 GENERAL PHARMACY W/ HCPCS (ALT 636 FOR OP/ED): Performed by: STUDENT IN AN ORGANIZED HEALTH CARE EDUCATION/TRAINING PROGRAM

## 2025-03-31 PROCEDURE — 85610 PROTHROMBIN TIME: CPT | Performed by: STUDENT IN AN ORGANIZED HEALTH CARE EDUCATION/TRAINING PROGRAM

## 2025-03-31 PROCEDURE — 85025 COMPLETE CBC W/AUTO DIFF WBC: CPT | Performed by: STUDENT IN AN ORGANIZED HEALTH CARE EDUCATION/TRAINING PROGRAM

## 2025-03-31 PROCEDURE — 36415 COLL VENOUS BLD VENIPUNCTURE: CPT | Performed by: STUDENT IN AN ORGANIZED HEALTH CARE EDUCATION/TRAINING PROGRAM

## 2025-03-31 PROCEDURE — 83735 ASSAY OF MAGNESIUM: CPT | Performed by: STUDENT IN AN ORGANIZED HEALTH CARE EDUCATION/TRAINING PROGRAM

## 2025-03-31 PROCEDURE — 80069 RENAL FUNCTION PANEL: CPT | Performed by: STUDENT IN AN ORGANIZED HEALTH CARE EDUCATION/TRAINING PROGRAM

## 2025-03-31 PROCEDURE — RXMED WILLOW AMBULATORY MEDICATION CHARGE

## 2025-03-31 PROCEDURE — 99239 HOSP IP/OBS DSCHRG MGMT >30: CPT | Performed by: INTERNAL MEDICINE

## 2025-03-31 RX ORDER — OXYCODONE HYDROCHLORIDE 5 MG/1
5 TABLET ORAL EVERY 12 HOURS PRN
Qty: 15 TABLET | Refills: 0 | Status: SHIPPED | OUTPATIENT
Start: 2025-03-31

## 2025-03-31 RX ORDER — METHOCARBAMOL 500 MG/1
1000 TABLET, FILM COATED ORAL EVERY 8 HOURS SCHEDULED
Qty: 60 TABLET | Refills: 0 | Status: SHIPPED | OUTPATIENT
Start: 2025-03-31

## 2025-03-31 RX ORDER — ACETAMINOPHEN 500 MG
975 TABLET ORAL EVERY 8 HOURS
Qty: 120 TABLET | Refills: 0 | Status: SHIPPED | OUTPATIENT
Start: 2025-03-31

## 2025-03-31 RX ORDER — AMOXICILLIN 250 MG
2 CAPSULE ORAL 2 TIMES DAILY
Qty: 120 TABLET | Refills: 0 | Status: SHIPPED | OUTPATIENT
Start: 2025-03-31

## 2025-03-31 RX ORDER — CIPROFLOXACIN 500 MG/1
500 TABLET ORAL 2 TIMES DAILY
Qty: 20 TABLET | Refills: 0 | Status: SHIPPED | OUTPATIENT
Start: 2025-03-31 | End: 2025-04-10

## 2025-03-31 RX ORDER — TAMSULOSIN HYDROCHLORIDE 0.4 MG/1
0.4 CAPSULE ORAL DAILY
Qty: 30 CAPSULE | Refills: 1 | Status: SHIPPED | OUTPATIENT
Start: 2025-03-31

## 2025-03-31 RX ADMIN — PANTOPRAZOLE SODIUM 40 MG: 40 TABLET, DELAYED RELEASE ORAL at 06:42

## 2025-03-31 RX ADMIN — Medication 2000 UNITS: at 09:54

## 2025-03-31 RX ADMIN — SENNOSIDES AND DOCUSATE SODIUM 2 TABLET: 50; 8.6 TABLET ORAL at 09:54

## 2025-03-31 RX ADMIN — DILTIAZEM HYDROCHLORIDE 120 MG: 120 CAPSULE, COATED, EXTENDED RELEASE ORAL at 09:54

## 2025-03-31 RX ADMIN — ACETAMINOPHEN 975 MG: 325 TABLET ORAL at 06:41

## 2025-03-31 RX ADMIN — POLYETHYLENE GLYCOL 3350 17 G: 17 POWDER, FOR SOLUTION ORAL at 09:54

## 2025-03-31 RX ADMIN — OXYCODONE HYDROCHLORIDE 5 MG: 5 TABLET ORAL at 10:05

## 2025-03-31 RX ADMIN — METHOCARBAMOL TABLETS 1000 MG: 500 TABLET, COATED ORAL at 06:41

## 2025-03-31 RX ADMIN — ACETAMINOPHEN 975 MG: 325 TABLET ORAL at 13:38

## 2025-03-31 ASSESSMENT — COGNITIVE AND FUNCTIONAL STATUS - GENERAL
DAILY ACTIVITIY SCORE: 24
DAILY ACTIVITIY SCORE: 24
MOBILITY SCORE: 24

## 2025-03-31 ASSESSMENT — PAIN SCALES - GENERAL
PAINLEVEL_OUTOF10: 6
PAINLEVEL_OUTOF10: 2
PAINLEVEL_OUTOF10: 0 - NO PAIN
PAINLEVEL_OUTOF10: 2

## 2025-03-31 ASSESSMENT — PAIN SCALES - WONG BAKER: WONGBAKER_NUMERICALRESPONSE: HURTS LITTLE BIT

## 2025-03-31 ASSESSMENT — ACTIVITIES OF DAILY LIVING (ADL): ADL_ASSISTANCE: INDEPENDENT

## 2025-03-31 ASSESSMENT — PAIN DESCRIPTION - LOCATION: LOCATION: GENERALIZED

## 2025-03-31 ASSESSMENT — PAIN - FUNCTIONAL ASSESSMENT
PAIN_FUNCTIONAL_ASSESSMENT: 0-10
PAIN_FUNCTIONAL_ASSESSMENT: 0-10

## 2025-03-31 NOTE — CONSULTS
"Nutrition Note:   Nutrition Assessment       Patient is a 64 y.o. male presenting with R pyelonephritis w/ severe R hydronephrosis d/t obstructing renal stone.     Taken to OR (3/29) now s/p cystoscopy with ureteral stent placement.     RDN consulted for nursing admission screen (chronic wounds).     Since procedure pt has been consistently consuming 100% of meals & tolerating diet without issue. Historical weights reviewed & indicate no significant weight loss over the past year. Chronic LE venous ulcers not related to inadequate nutrition. Will defer full assessment given no nutritional intervention indicated at this time. Please re-consult if clinical status declines.    Anthropometrics:  Height: 177.8 cm (5' 10\")   Weight: 142 kg (312 lb)   BMI (Calculated): 44.77    Dietary Orders (From admission, onward)       Start     Ordered    03/29/25 1539  Adult diet Regular  Diet effective now        Question:  Diet type  Answer:  Regular    03/29/25 1538    03/29/25 1216  May Participate in Room Service  ( ROOM SERVICE MAY PARTICIPATE)  Once        Question:  .  Answer:  Yes    03/29/25 1215               Time Spent (min): 30 minutes       "

## 2025-03-31 NOTE — CONSULTS
Wound Care Consult     Visit Date: 3/31/2025      Patient Name: Hugo Gauthier         MRN: [id number]           YOB: 1960     Reason for Consult:   LLE wound     Wound History:   Chronic, has had x 3 + years per report of patient though it is finally improving under the care of his podiatrist at [facility]     Pertinent Labs:   Albumin   Date Value Ref Range Status   03/31/2025 2.9 (L) 3.4 - 5.0 g/dL Final       Wound Assessment:  Wound 03/29/25 Pretibial Left (Active)   Wound Image   03/29/25 1204   Drainage Description None 03/31/25 1041   Drainage Amount None 03/31/25 1041   Dressing ABD;Petroleum gauze;Hydrofiber;Kerlix/rolled gauze;Other (Comment) 03/31/25 1041   Dressing Changed New 03/31/25 1041   Dressing Status Clean;Dry 03/31/25 0800       Wound 03/29/25 Pretibial Right (Active)   Wound Image   03/29/25 1204   Dressing Open to air 03/30/25 0805   Dressing Status Clean;Dry 03/31/25 0800       Wound 03/29/25 Dorsal foot;Left (Active)   Wound Image   03/29/25 1205   Dressing Status Clean;Dry 03/31/25 0800       Wound Team Summary Assessment:   Patient and wife shared patient's long journey with his posterior LLE wound. Presently he goes to the Wound Center monthly (they report cost prohibits them from more frequent visits).  Meanwhile his wife changes his dressing every other day at home.  The W[facility] Care Center orders / plan of care was reviewed with wife and she assisted in the care. The orders include:  Betadine to wash the wound only, soap/water (or NS) to cleanse the rest, apply moisturizer, apply adaptic followed by Aquacel to the ulcer itself, ABD pad, Kerlix wrap.  The irregularly shaped full thickness wound is 7 x 5 cm, the bed 100% covered with dry tan slough, no active drainage.  The edwina-wound skin and that of entire LLE is very dry, scaly, shedding.  There is firmness to the leg which wife  reports is WNL.  No acute edema is seen, no SOI.      Wound Team Plan:    Change dressing every other day using Betadine to wash the wound only, soap/water (or NS) to cleanse the rest of leg, apply moisturizer, apply adaptic followed by Aquacel to the ulcer itself, ABD pad, Kerlix wrap.  Orders were obtained and care provided.     Ida Welch RN, [facility], [facility]  3/31/2025  5:03 PM

## 2025-03-31 NOTE — PROGRESS NOTES
Physical Therapy    Physical Therapy Evaluation    Patient Name: Hugo Gauthier  MRN: [id number]  Department: [address]  Room: 128/[facility]  Today's Date: 3/31/2025   Time Calculation  Start Time: 1239  Stop Time: 1300  Time Calculation (min): 21 min    Assessment/Plan   PT Assessment  PT Assessment Results: Decreased mobility  Rehab Prognosis: Good  Barriers to Discharge Home: No anticipated barriers  Evaluation/Treatment Tolerance: Patient tolerated treatment well  Medical Staff Made Aware: Yes  End of Session Communication: Bedside nurse  End of Session Patient Position: Alarm off, not on at start of session (sitting EOB; no alarm needed per RN)    Assessment:   Pt presents near baseline, completing transfers/ambulation with mod-I. Issued bariatric FWW this date. Pt verbalized understanding of proper fit and use of device. No further acute/post-acute PT needs at this time.         IP OR SWING BED PT PLAN  Inpatient or Swing Bed: Inpatient  PT Plan  PT Plan: PT Eval only  PT Eval Only Reason: Safe to return home  PT Frequency: PT eval only  PT Discharge Recommendations: No further acute PT, No PT needed after discharge  Equipment Recommended upon Discharge: Wheeled walker (issued bariatric WW this date)  PT Recommended Transfer Status: Independent  PT - OK to Discharge: Yes    Subjective   General Visit Information:  General  Reason for Referral: Pt is a 63 yo M presenting as transfer from [place] on 3/29/25 with diarrhea, abdominal pain, weakness, and multiple falls. Workup revealed right pyelonephritis with severe R hydronephrosis due to obstructive renal stone, now s/p cystoscopy and insertion of urethral stent (3/29/25). PT consulted for mobility training.  Past Medical History Relevant to Rehab: DVT, chronic LLE wound, chronic venous stasis, CAD, HTN, HFpEF, chronic LBP, RADHA (not on PAP therapy), former smoker  Family/Caregiver Present: Yes  Caregiver Feedback: Spouse present during session  Prior to Session  Communication: Bedside nurse  Patient Position Received: Alarm off, not on at start of session (sitting EOB, finishing lunch)  General Comment: Pt pleasant and cooperative with PT abhay.  Home Living:  Home Living  Type of Home: House  Lives With: Spouse  Home Adaptive Equipment: Cane  Home Layout: One level  Home Access: Stairs to enter with rails  Entrance Stairs-Number of Steps: 5  Bathroom Shower/Tub: Tub/shower unit  Bathroom Equipment: Grab bars in shower  Prior Level of Function:  Prior Function Per Pt/Caregiver Report  Level of [place]: Independent with ADLs and functional transfers, Independent with homemaking with ambulation  ADL Assistance: Independent  Homemaking Assistance: Independent  Ambulatory Assistance: Independent (with cane PRN)  Vocational: Full time employment (Light duty)  Precautions:  Precautions  Medical Precautions: Fall precautions  Precautions Comment: Colt Benitez             Objective   Pain:  Pain Assessment  Pain Assessment: 0-10  0-10 (Numeric) Pain Score: 0 - No pain  Cognition:  Cognition  Overall Cognitive Status: Within Functional Limits  Orientation Level: Oriented X4    General Assessments:                  Activity Tolerance  Endurance: Endurance does not limit participation in activity    Sensation  Light Touch: No apparent deficits    Static Sitting Balance  Static Sitting-Balance Support: Feet supported, No upper extremity supported  Static Sitting-Level of Assistance: Independent  Static Sitting-Comment/Number of Minutes: EOB    Static Standing Balance  Static Standing-Balance Support: Bilateral upper extremity supported  Static Standing-Level of Assistance: Modified independent  Static Standing-Comment/Number of Minutes: with FWW  Functional Assessments:  Bed Mobility  Bed Mobility: No (pt sitting EOB at start/end of session)    Transfers  Transfer: Yes  Transfer 1  Technique 1: Sit to stand, Stand to sit  Transfer Device 1: Walker  Transfer Level of Assistance 1:  Modified independent    Ambulation/Gait Training  Ambulation/Gait Training Performed: Yes  Ambulation/Gait Training 1  Surface 1: Level tile  Device 1: Rolling walker  Assistance 1: Modified independent  Quality of Gait 1: Wide base of support, Decreased step length  Comments/Distance (ft) 1: 25' in room; steady, reciprocal gait  Extremity/Trunk Assessments:  RLE   RLE : Within Functional Limits  LLE   LLE : Within Functional Limits  Outcome Measures:  [facility] Basic Mobility  Turning from your back to your side while in a flat bed without using bedrails: None  Moving from lying on your back to sitting on the side of a flat bed without using bedrails: None  Moving to and from bed to chair (including a wheelchair): None  Standing up from a chair using your arms (e.g. wheelchair or bedside chair): None  To walk in hospital room: None  Climbing 3-5 steps with railing: None  Basic Mobility - Total Score: 24    Encounter Problems       Encounter Problems (Active)       Pain - Adult              Education Documentation  Mobility Training, taught by Bisi Erwin, PT at 3/31/2025  1:22 PM.  Learner: Significant Other, Patient  Readiness: Acceptance  Method: Explanation, Demonstration  Response: Verbalizes Understanding, Demonstrated Understanding  Comment: Role of acute PT, discharge planning, proper fit/use/care of FWW    Education Comments  No comments found.

## 2025-03-31 NOTE — PROGRESS NOTES
03/31/25 1302   Discharge Planning   Living Arrangements Spouse/significant other   Support Systems Spouse/significant other   Assistance Needed Patient is A&Ox3, independent in ADL's, uses a cane PRN, no DME, drives PCP Dr. Luna Patient requesting return to work note from provider and walker, provider to send script.   Type of Residence Private residence   Number of Stairs to Enter Residence 5   Number of Stairs Within Residence 12   Do you have animals or pets at home? Yes   Type of Animals or Pets outside cats, sheep   Who is requesting discharge planning? Provider   Home or Post Acute Services None   Expected Discharge Disposition Home  (PT/OT evals pending. Patient denies need for HHC. Provider aware patient would like walker and needs return to work note. Patient made aware that any further questions about return to work paperwork after discharge would be deferred to PCP.)   Does the patient need discharge transport arranged? No   Stroke Family Assessment   Stroke Family Assessment Needed No   Intensity of Service   Intensity of Service 0-30 min        03/31/25 1408   Discharge Planning   Expected Discharge Disposition Home  (Patient medically ready for discharge. PT evaluated patient and found no needs. Script for walker printed and PT notified. PT to deliver walker to patient's bedside. Patient denies further needs.)

## 2025-03-31 NOTE — CARE PLAN
The patient's goals for the shift include      The clinical goals for the shift include patient will have increased output from [place]    Problem: Pain - Adult  Goal: Verbalizes/displays adequate comfort level or baseline comfort level  Outcome: Progressing     Problem: Safety - Adult  Goal: Free from fall injury  Outcome: Progressing     Problem: Discharge Planning  Goal: Discharge to home or other facility with appropriate resources  Outcome: Progressing     Problem: Chronic Conditions and Co-morbidities  Goal: Patient's chronic conditions and co-morbidity symptoms are monitored and maintained or improved  Outcome: Progressing     Problem: Nutrition  Goal: Nutrient intake appropriate for maintaining nutritional needs  Outcome: Progressing     Problem: Skin  Goal: Decreased wound size/increased tissue granulation at next dressing change  Outcome: Progressing  Flowsheets (Taken 3/31/2025 0201)  Decreased wound size/increased tissue granulation at next dressing change: Promote sleep for wound healing  Goal: Participates in plan/prevention/treatment measures  Outcome: Progressing  Flowsheets (Taken 3/31/2025 0201)  Participates in plan/prevention/treatment measures: Elevate heels  Goal: Prevent/manage excess moisture  Outcome: Progressing  Flowsheets (Taken 3/31/2025 0201)  Prevent/manage excess moisture: Monitor for/manage infection if present  Goal: Prevent/minimize sheer/friction injuries  Outcome: Progressing  Flowsheets (Taken 3/31/2025 0201)  Prevent/minimize sheer/friction injuries: HOB 30 degrees or less  Goal: Promote/optimize nutrition  Outcome: Progressing  Flowsheets (Taken 3/31/2025 0201)  Promote/optimize nutrition: Monitor/record intake including meals  Goal: Promote skin healing  Outcome: Progressing  Flowsheets (Taken 3/31/2025 0201)  Promote skin healing: Protective dressings over bony prominences     Problem: Meds/Post-op Pain  Goal: Pain controlled to tolerate pain level  Outcome: Progressing  Goal:  Tolerates prescribed medication  Outcome: Progressing     Problem: DVT/VTE Prevention/Activity  Goal: No decrease in circulation/sensation  Outcome: Progressing  Goal: Prevent skin breakdown  Outcome: Progressing  Goal: Return to preop oxygenation status  Outcome: Progressing  Goal: Tolerates optimal activity  Outcome: Progressing  Goal: Increase self care and/or family involvement in 24 hrs.  Outcome: Progressing     Problem: Wound care/infection prevention  Goal: No signs of infection in 24 hrs.  Outcome: Progressing  Goal: No unexpected bleeding from incision this shift  Outcome: Progressing     Problem: Diet/fluid balance  Goal: Adequate urinary output  Outcome: Progressing  Goal: Free from nausea/vomiting  Outcome: Progressing  Goal: Return in bowel function  Outcome: Progressing  Goal: Tolerates prescribed diet  Outcome: Progressing     Problem: Other goals  Goal: No change in neurological status  Outcome: Progressing  Goal: Stabilize vital signs (return to 10% of baseline)  Outcome: Progressing     Problem: Infective UTI/pyelonephritis  Goal: Follows fluid restrictions or becomes normovolemic  Outcome: Progressing  Goal: Manages/understands urgency, continence  Outcome: Progressing  Goal: No signs of neurosensory, musculoskeletal, cardiac changes  Outcome: Progressing  Goal: No worsening signs of infection  Outcome: Progressing  Goal: Stable weight and I&O  Outcome: Progressing     Problem: Obstructive: Kidney Stones, Hydronephrosis, BPH  Goal: Achieves normovolemia  Outcome: Progressing  Goal: Relieve obstruction  Outcome: Progressing  Goal: Return of normal voiding pattern  Outcome: Progressing  Goal: Skin/surrounding tissue free from infection/excoriation  Outcome: Progressing     Problem: Diabetes  Goal: Achieve decreasing blood glucose levels by end of shift  Outcome: Progressing  Goal: Increase stability of blood glucose readings by end of shift  Outcome: Progressing  Goal: Decrease in ketones present  in urine by end of shift  Outcome: Progressing  Goal: Maintain electrolyte levels within acceptable range throughout shift  Outcome: Progressing  Goal: Maintain glucose levels >70mg/dl to <250mg/dl throughout shift  Outcome: Progressing  Goal: No changes in neurological exam by end of shift  Outcome: Progressing  Goal: Learn about and adhere to nutrition recommendations by end of shift  Outcome: Progressing  Goal: Vital signs within normal range for age by end of shift  Outcome: Progressing  Goal: Increase self care and/or family involovement by end of shift  Outcome: Progressing  Goal: Receive DSME education by end of shift  Outcome: Progressing

## 2025-03-31 NOTE — DISCHARGE SUMMARY
[facility] Hospitalist  Discharge Summary with Discharge Day Progress Note and Transition Note                 Hugo Gauthier                  : 1960                      MRN:  [id number]     ADMIT DATE: 3/29/2025            DISCHARGE DATE:  3/31/2025    LOS: 2  day(s) since admission     PRIMARYCARE PHYSICIAN:  Edilma Luna MD     VISIT STATUS: Admission     CODE STATUS:  Full Code     DISCHARGE DIAGNOSES:    Assessment & Plan  Pyelonephritis    Right kidney stone       HOSPITAL COURSE:  Hugo Gauthier is a 64 y.o. male with PMH DVT, chronic LLE wound, chronic venous stasis, CAD, HTN, HFpEF, chronic LBP, RADHA (not on PAP therapy), former smoker and Class III obesity presented to [facility] ED with 1 week of worsening generalized abdominal pain associated with diarrhea and urinary incontinence, subjective fever, rigor, diaphoresis, anorexia and lethargy. Denies nausea or emesis. Patient reported stopped taking his medications during his illness     In ED VSS. Labs was significant for hyponatremia (130), azotemia (22/1.36), hypoalbuminemia (3.1), subtherapeutic INR (1.5), leukocytosis (12.9), negative Flu/COVID-19,UA positive for pyuria and hematuria. CT A/P was positive for severe right hydronephrosis due to obstructive renal calculus (1.6 x 1.1 x 2.8 cm ), B/L nephrolithiasis, right perinephric stranding, mild retroperitoneal adenopath, Mildly dilated, fluid-filled, appendix, may be secondary to the presence of liquid stool at the cecum.     Patient was admitted for right pyelonephritis with severe R hydronephrosis due to obstructive renal stone -s/p R ureteral stent (3/29/25).      #Right pyelonephritis (Proteus mirabilis)  #Severe R hydronephrosis due to obstructive renal stone-s/p R ureteral stent (3/29/25)  #OLIMPIA, improving  #Mildly dilated, fluid-filled, appendix w/o s/o appendicitis  #B/L nephrolithiasis  #Chronic HFpEF  #CAD  #RADHA (not on PAP therapy)  #Chronic venous stasis  #Class III obesity is  44.77 kg/m² with serious co morbidities    PROCEDURES:  -s/p R ureteral stent (3/29/25)  CONSULTANTS:  urology, surgery     COMPLEXITY OF FOLLOW UP:  [x] Moderate Complexity: follow up within 7-14 calendar days (00329)  []Severe Complexity: follow up within 7 calendar days (23851)     FOLLOW UP TESTING, PENDING RESULTS ORREFERRALS AT TRANSITIONAL CARE VISIT:   [x]Yes  F/U with urology for stone management   [] No    DAY OF DISCHARGE:  Review of Systems   All other systems reviewed and are negative.       Patient Vitals for the past 24 hrs:   BP Temp Temp src Pulse Resp SpO2   25 0818 147/82 36.5 °C (97.7 °F) -- 70 -- 94 %   25 0527 149/79 36.3 °C (97.3 °F) Temporal 66 18 98 %   25 2100 -- -- -- 74 -- 97 %   25 2037 146/69 36.7 °C (98.1 °F) Temporal 72 20 96 %   25 1650 148/79 36.7 °C (98.1 °F) Temporal 72 22 93 %        Average, Min, and Max forlast 24 hours Vitals:  TEMPERATURE:  Temp  Av.6 °C (97.8 °F)  Min: 36.3 °C (97.3 °F)  Max: 36.7 °C (98.1 °F)     RESPIRATIONS RANGE: Resp  Av  Min: 18  Max: 22     PULSE RANGE: Pulse  Av.8  Min: 66  Max: 74     BLOOD PRESSURE RANGE:  Systolic (24hrs), Av , Min:146 , Max:149   ; Diastolic (24hrs), Av, Min:69, Max:82       PULSE OXIMETRYRANGE: SpO2  Av.6 %  Min: 93 %  Max: 98 %  Body mass index is 44.77 kg/m².     I/O last 3 completed shifts:  In: 1730 (12.2 mL/kg) [P.O.:1680; IV Piggyback:50]  Out: 4800 (33.9 mL/kg) [Urine:4800 (0.9 mL/kg/hr)]  Weight: 141.5 kg      Physical Exam  Vitals and nursing note reviewed.   Constitutional:       General: He is not in acute distress.     Appearance: Normal appearance. He is not ill-appearing.   HENT:      Head: Normocephalic and atraumatic.      Right Ear: External ear normal.      Left Ear: External ear normal.      Nose: Nose normal. No congestion or rhinorrhea.      Mouth/Throat:      Mouth: Mucous membranes are moist.   Eyes:      Extraocular Movements: Extraocular  movements intact.      Pupils: Pupils are equal, round, and reactive to light.   Cardiovascular:      Rate and Rhythm: Normal rate and regular rhythm.      Pulses: Normal pulses.      Heart sounds: Normal heart sounds.   Pulmonary:      Effort: Pulmonary effort is normal.      Breath sounds: Normal breath sounds.   Abdominal:      General: Abdomen is flat. Bowel sounds are normal.      Palpations: Abdomen is soft.      Tenderness: There is no right CVA tenderness (resolved).   Genitourinary:     Comments: FC removed  Musculoskeletal:         General: Normal range of motion.      Cervical back: Normal range of motion and neck supple.      Right lower leg: No edema.      Left lower leg: No edema.   Skin:     General: Skin is warm and dry.      Capillary Refill: Capillary refill takes less than 2 seconds.   Neurological:      General: No focal deficit present.      Mental Status: He is alert and oriented to person, place, and time. Mental status is at baseline.   Psychiatric:         Mood and Affect: Mood normal.         Thought Content: Thought content normal.         Judgment: Judgment normal.           DISCHARGE MEDICATIONS:     Significant Medication Changes:         Your medication list        START taking these medications        Instructions Last Dose Given Next Dose Due   acetaminophen 500 mg tablet  Commonly known as: Tylenol      Take 2 tablets (1,000 mg) by mouth every 8 hours.       ciprofloxacin 500 mg tablet  Commonly known as: Cipro      Take 1 tablet (500 mg) by mouth 2 times a day for 10 days.       methocarbamol 500 mg tablet  Commonly known as: Robaxin      Take 2 tablets (1,000 mg) by mouth every 8 hours.       oxyCODONE 5 mg immediate release tablet  Commonly known as: Roxicodone      Take 1 tablet (5 mg) by mouth every 12 hours if needed for severe pain (7 - 10).       sennosides-docusate sodium 8.6-50 mg tablet  Commonly known as: Dania-Colace      Take 2 tablets by mouth 2 times a day.        tamsulosin 0.4 mg 24 hr capsule  Commonly known as: Flomax      Take 1 capsule (0.4 mg) by mouth once daily.              CONTINUE taking these medications        Instructions Last Dose Given Next Dose Due   alpha tocopherol 268 mg (400 unit) capsule  Commonly known as: Vitamin E           ascorbic acid 1,000 mg tablet  Commonly known as: Vitamin C           cholecalciferol 50 MCG (2000 UT) tablet  Commonly known as: Vitamin D-3           dilTIAZem  mg 24 hr capsule  Commonly known as: Cardizem CD      TAKE 1 CAPSULE(120 MG) BY MOUTH EVERY DAY       folic acid 800 mcg tablet  Commonly known as: Folvite           iodine (bulk) crystals           lisinopril 20 mg tablet      Take 1 tablet (20 mg) by mouth once daily.       magnesium oxide 400 mg tablet  Commonly known as: Mag-Ox           SantyL 250 unit/gram ointment  Generic drug: collagenase           selenium 200 mcg tablet           spironolactone 25 mg tablet  Commonly known as: Aldactone      TAKE 1 TABLET BY MOUTH ONCE DAILY       warfarin 1 mg tablet  Commonly known as: Coumadin      TAKE 1 TABLET BY MOUTH EVERY DAY       warfarin 4 mg tablet  Commonly known as: Coumadin      TAKE 1 TABLET EVERY DAY       zinc gluconate 100 mg tablet      Take 1 tablet (100 mg) by mouth once daily.              STOP taking these medications      ketoconazole 2 % cream  Commonly known as: NIZOral        nystatin cream  Commonly known as: Mycostatin        sildenafil 100 mg tablet  Commonly known as: Viagra        silver sulfADIAZINE 1 % cream  Commonly known as: Silvadene        triamcinolone 0.1 % cream  Commonly known as: Kenalog                  Where to Get Your Medications        These medications were sent to [facility]  [address] [place] [address]      Hours: 9 AM to 5 PM Mon-Fri Phone: [phone]   acetaminophen 500 mg tablet  ciprofloxacin 500 mg tablet  methocarbamol 500 mg tablet  oxyCODONE 5 mg immediate release  tablet  sennosides-docusate sodium 8.6-50 mg tablet  tamsulosin 0.4 mg 24 hr capsule            DIET: cardiac      DISPOSITION:  Home     Follow up with Edilma Luna MD  .     DISCHARGE TIME: > 30 minutes    Electronically signed by Louise Varma DO on 03/31/25 at 3:11 PM    Dictated using [employer] Version 2.4  Proof read however unrecognized voice recognition errors may have occurred

## 2025-03-31 NOTE — PROGRESS NOTES
"General/Trauma Surgery Daily Progress Note    Subjective   POD 1 cystoscopy with stent placement.  Feeling well, denies abdominal pain.  Enjoying breakfast without nausea or vomiting.  Sitting up on edge of bed.       Objective   Last Recorded Vitals:  Blood pressure 147/82, pulse 70, temperature 36.5 °C (97.7 °F), resp. rate 18, height 1.778 m (5' 10\"), weight 142 kg (312 lb), SpO2 94%.    Intake/Output last 3 Shifts:  I/O last 3 completed shifts:  In: 1730 (12.2 mL/kg) [P.O.:1680; IV Piggyback:50]  Out: 4800 (33.9 mL/kg) [Urine:4800 (0.9 mL/kg/hr)]  Weight: 141.5 kg     Pain Score:  0-10 (Numeric) Pain Score: 6  Soto-Baker FACES Pain Rating: Hurts little bit     Physical Exam:  Constitutional: No acute distress, sitting up on edge of bed eating breakfast.  Neuro: Alert, oriented. Follows commands.   Eyes: EOMI. No scleral icterus. Conjunctiva pink.  ENT: MMM.   Heart: Regular rate.  Respiratory: No increased work of breathing or audible wheeze.  Abdomen: Soft, nondistended.  Nontender.  MSK: Moves all extremities.  Vascular: Palpable pulses throughout, no pitting edema.  Skin: No rashes.   Psychological: Appropriate mood and behavior.            Relevant Results  Laboratory Results:  CBC:   Lab Results   Component Value Date    WBC 8.1 03/31/2025    RBC 3.89 (L) 03/31/2025    HGB 10.6 (L) 03/31/2025    HCT 35.7 (L) 03/31/2025     03/31/2025       RFP:   Lab Results   Component Value Date     03/31/2025    K 4.3 03/31/2025     03/31/2025    CO2 25 03/31/2025    BUN 19 03/31/2025    CREATININE 1.09 03/31/2025    CALCIUM 8.2 (L) 03/31/2025    MG 1.92 03/31/2025    PHOS 4.2 03/31/2025        LFTs:   Lab Results   Component Value Date    ALBUMIN 2.9 (L) 03/31/2025         Imaging:  No overnight imaging reviewed      Assessment/Plan   This is a 64 y.o. male now POD 1 cystoscopy with ureteral stent placement for right pyelonephritis and severe right hydronephrosis.  Surgery consulted for question of " colitis, he has been tolerating a diet with no issues.  Appendicitis not likely at this time.      Plan:   -- Continue diet as tolerated  --No indication for surgical intervention at this time, surgery will sign off.  Please call with any questions or concerns.            Seen and discussed with Dr. Griffith who is in agreement with plan. Please secure chat with questions.    Dorota Barksdale PA-C

## 2025-03-31 NOTE — PROGRESS NOTES
Occupational Therapy                      OT Screen    Patient Name: Hugo Gauthier  MRN: [id number]  Department: [address]  [facility]  Today's Date: 3/31/2025     Discipline: Occupational Therapy    OT Missed Visit: Yes     Missed Visit Reason: Missed Visit Reason: Cancel (Per discussion w/ PT, patient ind for ADLs and functional mobility. No acute OT needs at this time. Will discontinue orders.)    Missed Time: Cancel

## 2025-03-31 NOTE — PROGRESS NOTES
"Hugo Gauthier is a 64 y.o. male on day 2 of admission presenting with Pyelonephritis.    Subjective   Patient sitting up at side of bed eating breakfast. States he had an episode last night where he became sweaty but feels better this am. Complains of some abdominal discomfort. Tavera draining clear yellow urine       Objective     Physical Exam  Constitutional:       General: He is not in acute distress.  Cardiovascular:      Rate and Rhythm: Normal rate.   Pulmonary:      Effort: Pulmonary effort is normal.   Genitourinary:     Comments: Tavera draining clear yellow urine  Skin:     General: Skin is warm and dry.   Neurological:      Mental Status: He is alert and oriented to person, place, and time.         Last Recorded Vitals  Blood pressure 147/82, pulse 70, temperature 36.5 °C (97.7 °F), resp. rate 18, height 1.778 m (5' 10\"), weight 142 kg (312 lb), SpO2 94%.  Intake/Output last 3 Shifts:  I/O last 3 completed shifts:  In: 1730 (12.2 mL/kg) [P.O.:1680; IV Piggyback:50]  Out: 4800 (33.9 mL/kg) [Urine:4800 (0.9 mL/kg/hr)]  Weight: 141.5 kg     Relevant Results                                Assessment/Plan   Assessment & Plan  Pyelonephritis    Right kidney stone    This is a 64 year old male admitted for pyelonephritis and a right kidney stone POD 1 cystoscopy and right ureteral stent placement.     Recommendations:  - plan for TOV this am  - follow up appointment made for 4/7 with Dr. Taylor to discuss next step in stone management   - antibiotics per primary team  - remainder of care per primary team  - once TOV complete, ok for DC from urological standpoint    Patient discussed with Dr. Taylor. Urology will sign off, please reach out with questions or concerns.        I spent 15 minutes in the professional and overall care of this patient.      Sera Cheek, SAMIR-CNP      "

## 2025-04-01 ENCOUNTER — PATIENT OUTREACH (OUTPATIENT)
Dept: PRIMARY CARE | Facility: CLINIC | Age: 65
End: 2025-04-01
Payer: COMMERCIAL

## 2025-04-01 NOTE — PROGRESS NOTES
Discharge Facility: [facility]  Discharge Diagnosis:  Right pyelonephritis (Proteus mirabilis)  Severe R hydronephrosis due to obstructive renal stone  Admission Date: 29 Mar 25  Discharge Date: 31 Mar 25    PCP Appointment Date: Tasked to office  Specialist Appointment Date: 7 Apr 25 (Urology, [facility])  Hospital Encounter and Summary Linked: Yes  Admission (Discharged) with Louise Varma DO (03/29/2025)     See discharge assessment below for further details     Wrap Up  Wrap Up Additional Comments: Pt stating he is doing well since his discharge. pt feels he was not informed enough on what to expect with Catheter in place but is making due. questions regarding cath answered to the best of my ability. pt able to obtain all medications without difficulty. pt has no questions regarding medications or discharge instructions. pt does have walker with him but asked for it as a precaution rather than for an actual need in case he eventually needed it. office messaged about scheduling. pt ok with contact in 2 wks. (4/1/2025 10:55 AM)  Call End Time: 1056 (4/1/2025 10:55 AM)    Engagement  Call Start Time: 1046 (Spoke with patient) (4/1/2025 10:55 AM)    Medications  Medications reviewed with patient/caregiver?: Yes (4/1/2025 10:55 AM)  Is the patient having any side effects they believe may be caused by any medication additions or changes?: No (4/1/2025 10:55 AM)  Does the patient have all medications ordered at discharge?: Yes (4/1/2025 10:55 AM)  Prescription Comments: all meds obtained without difficulty (4/1/2025 10:55 AM)  Is the patient taking all medications as directed (includes completed medication regime)?: Yes (4/1/2025 10:55 AM)  Medication Comments: no questions on medications (4/1/2025 10:55 AM)    Appointments  Does the patient have a primary care provider?: Yes (Tasked to office) (4/1/2025 10:55 AM)  Has the patient kept scheduled appointments due by today?: Yes (4/1/2025 10:55 AM)    Self Management  What is  the home health agency?: None (4/1/2025 10:55 AM)  Has home health visited the patient within 72 hours of discharge?: Not applicable (4/1/2025 10:55 AM)  What Durable Medical Equipment (DME) was ordered?: Rolling Wheeled Walker, pt has currently with him (4/1/2025 10:55 AM)  Has all Durable Medical Equipment (DME) been delivered?: Yes (4/1/2025 10:55 AM)    Patient Teaching  Does the patient have access to their discharge instructions?: Yes (4/1/2025 10:55 AM)  Care Management Interventions: Reviewed instructions with patient (4/1/2025 10:55 AM)  What is the patient's perception of their health status since discharge?: Improving (4/1/2025 10:55 AM)  Is the patient/caregiver able to teach back the hierarchy of who to call/visit for symptoms/problems? PCP, Specialist, Home Health nurse, Urgent Care, ED, 911: Yes (4/1/2025 10:55 AM)  Patient/Caregiver Education Comments: none (4/1/2025 10:55 AM)

## 2025-04-02 LAB
ATRIAL RATE: 93 BPM
BACTERIA BLD CULT: NORMAL
BACTERIA BLD CULT: NORMAL
P AXIS: 61 DEGREES
PR INTERVAL: 164 MS
Q ONSET: 251 MS
QRS COUNT: 15 BEATS
QRS DURATION: 97 MS
QT INTERVAL: 320 MS
QTC CALCULATION(BAZETT): 401 MS
QTC FREDERICIA: 371 MS
R AXIS: 28 DEGREES
T AXIS: 26 DEGREES
T OFFSET: 411 MS
VENTRICULAR RATE: 94 BPM

## 2025-04-02 NOTE — SIGNIFICANT EVENT
Follow Up Phone Call    Outgoing phone call    Spoke to: Hugo Gauthier Relationship:self   Phone number: [phone]      Outcome: contacted patient/ family   No chief complaint on file.         Diagnosis:Not applicable    States he is feeling better. No further questions or concerns.           none

## 2025-04-04 DIAGNOSIS — I48.0 PAROXYSMAL ATRIAL FIBRILLATION (MULTI): ICD-10-CM

## 2025-04-06 NOTE — PROGRESS NOTES
Subjective   Patient ID: Hugo Gauthier is a 64 y.o. male.      HPI  64 y.o. male presents for a postoperative follow up visit. He is status post cystoscopy and insertion of ureteral stent on 03/29/2025.    The patient was seen on 03/28/2025 for diarrhea, abdominal pain and generalized weakness that had been occurring for 4 days. He stated that he fell twice due to weakness. CT abdomen showed concern for tight sided obstructive pathology with severe hydronephrosis and pyelonephritis. He was transferred to [facility].     The patient was admitted on 03/29/2025, he underwent the procedure on that date. He was discharged on 03/31/2025. His discharge diagnoses was pyelonephritis and right kidney stone.     Postoperatively, the patient has been doing ***.       CT ABDOMEN PELVIS W IV CONTRAST; 3/28/2025   IMPRESSION:  1. Right-sided obstructive uropathy with severe right hydronephrosis  and with a 1.6 x 1.1 x 2.8 cm calculus at the proximal right ureter.  2. Mild right perinephric stranding and trace amount of perinephric  fluid, may be secondary to the above findings. Please correlate with  laboratory values/urinalysis to exclude superimposed acute  pyelonephritis.  3. Bilateral nephrolithiasis.  4. Interval development of mild retroperitoneal adenopathy.  Evaluation with nonemergent scrotal ultrasound recommended to exclude  testicular pathology.  5. Liquid stool at the cecum, ascending colon and the transverse  colon, may be seen with infectious/inflammatory colitis.  6. Mildly dilated, fluid-filled, appendix, may be secondary to the  presence of liquid stool at the cecum. Clinical correlation  recommended to exclude early/developing acute appendicitis.  7. Colonic diverticulosis.  8. Mild bilateral inguinal adenopathy, left more than right, mildly  decreased since prior CT 08/21/2023.    Review of Systems  A complete review of systems was performed. All systems are noted to be negative unless indicated in the history of  present illness, impression, active problem list, or past histories.     Objective   Physical Exam  General: Well developed, well nourished, alert and cooperative, appears in no acute distress  Eyes: Non-injected conjunctiva, sclera clear, no proptosis  Cardiac: Extremities are warm and well perfused. No edema, cyanosis or pallor.   Lungs: Breathing is easy, non-labored. Speaking in clear and complete sentences. Normal diaphragmatic movement.  Abdomen: soft, non-tender  MSK: Ambulatory with steady gait, unassisted  Neuro: alert and oriented to person, place and time  Psych: Demonstrates good judgement and reason, without hallucinations, abnormal affect or abnormal behaviors.  Skin: no obvious lesions, no rashes.      Assessment/Plan   There are no diagnoses linked to this encounter.    64 y.o. male presents for a postoperative follow up visit. He is status post cystoscopy and insertion of ureteral stent on 03/29/2025.    I personally reviewed the medical records of the patient including the lab values, the note of the referring physician and I reviewed the report and visualized the images performed focusing on ***    Plan:        Scribe Attestation   By signing my name below, I, Linda Pereira, Scribe attestation that this documentation has been prepared under the direction and in the presence of Maged Taylor MD.    extraction or laser stone fragmentation, ESWL (extracorporeal shock wave lithotripsy), or more advanced options for bigger stones such as percutaneous or surgical approaches. The approach depends on the size and location of the stone as well as any associated infection.      I would not recommend the open approach or the PCNL due to his body habitus and being prescribed blood thinners. I have concerns over the risks that would occur if undergoing this approach. Due to extent of procedure, I discussed that the procedure would require 2 sessions. I explained that the PCNL would not be the recommended choice due to the above.     The patient will require 2 ureteroscopy sessions, as one session will not be enough time to completely removing all the stones. I would recommend the patient undergo 2 sessions of a ureteroscopy. The first stage will be completed on 04/24/2025. The second stage will be on 05/08/2025. He was instructed that he has to discontinue the Eliquis 3 days prior to surgery. He understands and would like to proceed with the staged procedure over the PCNL especially with the stent in place.     We will obtain a urine culture today in office.     Plan:  Urine culture today   Discontinue the Eliquis 3 days prior to surgery   Ureteroscopy and laser lithotripsy with exchange of DJ stent stage 1 on 04/24  Ureteroscopy and laser lithotripsy with exchange of DJ stent stage 2 on 05/08    Scribe Attestation   By signing my name below, Linda GOMEZ, Dawit attestation that this documentation has been prepared under the direction and in the presence of Maged Taylor MD.

## 2025-04-07 ENCOUNTER — APPOINTMENT (OUTPATIENT)
Dept: UROLOGY | Facility: CLINIC | Age: 65
End: 2025-04-07
Payer: COMMERCIAL

## 2025-04-07 ENCOUNTER — OFFICE VISIT (OUTPATIENT)
Dept: WOUND CARE | Facility: CLINIC | Age: 65
End: 2025-04-07
Payer: COMMERCIAL

## 2025-04-07 DIAGNOSIS — N40.1 BENIGN PROSTATIC HYPERPLASIA WITH LOWER URINARY TRACT SYMPTOMS, SYMPTOM DETAILS UNSPECIFIED: ICD-10-CM

## 2025-04-07 DIAGNOSIS — N13.30 HYDRONEPHROSIS OF RIGHT KIDNEY: Primary | ICD-10-CM

## 2025-04-07 PROCEDURE — 11042 DBRDMT SUBQ TIS 1ST 20SQCM/<: CPT

## 2025-04-07 PROCEDURE — G2211 COMPLEX E/M VISIT ADD ON: HCPCS | Performed by: STUDENT IN AN ORGANIZED HEALTH CARE EDUCATION/TRAINING PROGRAM

## 2025-04-07 PROCEDURE — 4010F ACE/ARB THERAPY RXD/TAKEN: CPT | Performed by: STUDENT IN AN ORGANIZED HEALTH CARE EDUCATION/TRAINING PROGRAM

## 2025-04-07 PROCEDURE — 99214 OFFICE O/P EST MOD 30 MIN: CPT | Performed by: STUDENT IN AN ORGANIZED HEALTH CARE EDUCATION/TRAINING PROGRAM

## 2025-04-07 PROCEDURE — 11045 DBRDMT SUBQ TISS EACH ADDL: CPT

## 2025-04-07 NOTE — LETTER
April 7, 2025     Patient: Hugo Gauthier   YOB: 1960   Date of Visit: 4/7/2025       To Whom It May Concern:    Hugo Gauthier was seen in my clinic on 4/7/2025 at 1:20 pm. Please excuse Hugo for his absence from work on this day to make the appointment. He will be having surgery on 4/24 and 5/8 with Dr. Taylor     If you have any questions or concerns, please don't hesitate to call [phone].         Sincerely,         Maged Taylor MD

## 2025-04-07 NOTE — LETTER
April 7, 2025     Patient: Hugo Gauthier   YOB: 1960   Date of Visit: 4/7/2025       To Whom It May Concern:    It is my medical opinion that Hugo Gauthier may return to full duty immediately with no restrictions.    If you have any questions or concerns, please don't hesitate to call.         Sincerely,        Maged Taylor MD

## 2025-04-08 ENCOUNTER — TELEPHONE (OUTPATIENT)
Dept: PRIMARY CARE | Facility: CLINIC | Age: 65
End: 2025-04-08
Payer: COMMERCIAL

## 2025-04-08 ENCOUNTER — PREP FOR PROCEDURE (OUTPATIENT)
Dept: UROLOGY | Facility: HOSPITAL | Age: 65
End: 2025-04-08
Payer: COMMERCIAL

## 2025-04-08 DIAGNOSIS — I50.32 CHRONIC HEART FAILURE WITH PRESERVED EJECTION FRACTION: ICD-10-CM

## 2025-04-08 DIAGNOSIS — E11.9 DIET-CONTROLLED DIABETES MELLITUS (MULTI): Primary | ICD-10-CM

## 2025-04-08 DIAGNOSIS — N20.0 RIGHT KIDNEY STONE: Primary | ICD-10-CM

## 2025-04-08 NOTE — PROGRESS NOTES
I reviewed the progress note and agree with the resident’s findings and plans as written. Case discussed with resident.    Doretha Lee, PharmD

## 2025-04-08 NOTE — TELEPHONE ENCOUNTER
Patient called said flma paper work will be faced over asked for it to be completed and faxed and wanted us to keep a copy for him and keep him updated.    Deadline is 4/18 for it to be seen in     Patient said he will be doing it day by day     [phone]    Patient said his wife Licha can  the papers

## 2025-04-09 LAB — BACTERIA UR CULT: NORMAL

## 2025-04-09 NOTE — TELEPHONE ENCOUNTER
Patient called with Answer to questions     3/[birth year]   Yes, He return 4/7     4. Currently on light duty but on light duty due to back as well   5. Yes, 4/24 and 5/8 are the dates for the procedure     Patient asked for a copy and a call once everything is completed and faxed

## 2025-04-12 ENCOUNTER — PREP FOR PROCEDURE (OUTPATIENT)
Dept: UROLOGY | Facility: HOSPITAL | Age: 65
End: 2025-04-12
Payer: COMMERCIAL

## 2025-04-14 ENCOUNTER — PRE-ADMISSION TESTING (OUTPATIENT)
Dept: PREADMISSION TESTING | Facility: HOSPITAL | Age: 65
End: 2025-04-14
Payer: COMMERCIAL

## 2025-04-14 ENCOUNTER — HOSPITAL ENCOUNTER (OUTPATIENT)
Dept: RADIOLOGY | Facility: HOSPITAL | Age: 65
Discharge: HOME | End: 2025-04-14
Payer: COMMERCIAL

## 2025-04-14 ENCOUNTER — OFFICE VISIT (OUTPATIENT)
Dept: NEUROLOGY | Facility: HOSPITAL | Age: 65
End: 2025-04-14
Payer: COMMERCIAL

## 2025-04-14 VITALS
HEIGHT: 70 IN | DIASTOLIC BLOOD PRESSURE: 73 MMHG | BODY MASS INDEX: 45.1 KG/M2 | WEIGHT: 315 LBS | TEMPERATURE: 97.9 F | SYSTOLIC BLOOD PRESSURE: 124 MMHG | HEART RATE: 82 BPM | OXYGEN SATURATION: 96 % | RESPIRATION RATE: 18 BRPM

## 2025-04-14 VITALS
WEIGHT: 315 LBS | HEART RATE: 78 BPM | SYSTOLIC BLOOD PRESSURE: 118 MMHG | DIASTOLIC BLOOD PRESSURE: 69 MMHG | BODY MASS INDEX: 46.19 KG/M2

## 2025-04-14 DIAGNOSIS — E66.813 CLASS 3 SEVERE OBESITY DUE TO EXCESS CALORIES WITH SERIOUS COMORBIDITY AND BODY MASS INDEX (BMI) OF 45.0 TO 49.9 IN ADULT: ICD-10-CM

## 2025-04-14 DIAGNOSIS — E66.01 CLASS 3 SEVERE OBESITY DUE TO EXCESS CALORIES WITH SERIOUS COMORBIDITY AND BODY MASS INDEX (BMI) OF 45.0 TO 49.9 IN ADULT: ICD-10-CM

## 2025-04-14 DIAGNOSIS — G47.31 CENTRAL SLEEP APNEA: ICD-10-CM

## 2025-04-14 DIAGNOSIS — N20.0 RIGHT KIDNEY STONE: ICD-10-CM

## 2025-04-14 DIAGNOSIS — G47.33 OSA (OBSTRUCTIVE SLEEP APNEA): Primary | ICD-10-CM

## 2025-04-14 PROCEDURE — G2211 COMPLEX E/M VISIT ADD ON: HCPCS | Performed by: PSYCHIATRY & NEUROLOGY

## 2025-04-14 PROCEDURE — 71046 X-RAY EXAM CHEST 2 VIEWS: CPT

## 2025-04-14 PROCEDURE — 71046 X-RAY EXAM CHEST 2 VIEWS: CPT | Performed by: RADIOLOGY

## 2025-04-14 PROCEDURE — 99213 OFFICE O/P EST LOW 20 MIN: CPT | Performed by: PSYCHIATRY & NEUROLOGY

## 2025-04-14 PROCEDURE — 4010F ACE/ARB THERAPY RXD/TAKEN: CPT | Performed by: PSYCHIATRY & NEUROLOGY

## 2025-04-14 PROCEDURE — 1036F TOBACCO NON-USER: CPT | Performed by: PSYCHIATRY & NEUROLOGY

## 2025-04-14 PROCEDURE — 3078F DIAST BP <80 MM HG: CPT | Performed by: PSYCHIATRY & NEUROLOGY

## 2025-04-14 PROCEDURE — 3074F SYST BP LT 130 MM HG: CPT | Performed by: PSYCHIATRY & NEUROLOGY

## 2025-04-14 ASSESSMENT — ENCOUNTER SYMPTOMS
LOSS OF SENSATION IN FEET: 1
CARDIOVASCULAR NEGATIVE: 1
MUSCULOSKELETAL NEGATIVE: 1
OCCASIONAL FEELINGS OF UNSTEADINESS: 1
SLEEPING PROBLEMS DURING THE LAST MONTH: 1
CONSTITUTIONAL NEGATIVE: 1
RESPIRATORY NEGATIVE: 1
DEPRESSION: 0
NEUROLOGICAL NEGATIVE: 1
GASTROINTESTINAL NEGATIVE: 1

## 2025-04-14 ASSESSMENT — LIFESTYLE VARIABLES: SMOKING_STATUS: NONSMOKER

## 2025-04-14 ASSESSMENT — DUKE ACTIVITY SCORE INDEX (DASI)
CAN YOU RUN A SHORT DISTANCE: NO
CAN YOU HAVE SEXUAL RELATIONS: NO
TOTAL_SCORE: 12.7
CAN YOU DO MODERATE WORK AROUND THE HOUSE LIKE VACUUMING, SWEEPING FLOORS OR CARRYING GROCERIES: NO
CAN YOU WALK INDOORS, SUCH AS AROUND YOUR HOUSE: YES
CAN YOU DO YARD WORK LIKE RAKING LEAVES, WEEDING OR PUSHING A MOWER: NO
CAN YOU TAKE CARE OF YOURSELF (EAT, DRESS, BATHE, OR USE TOILET): YES
CAN YOU DO HEAVY WORK AROUND THE HOUSE LIKE SCRUBBING FLOORS OR LIFTING AND MOVING HEAVY FURNITURE: NO
CAN YOU CLIMB A FLIGHT OF STAIRS OR WALK UP A HILL: YES
CAN YOU PARTICIPATE IN MODERATE RECREATIONAL ACTIVITIES LIKE GOLF, BOWLING, DANCING, DOUBLES TENNIS OR THROWING A BASEBALL OR FOOTBALL: NO
CAN YOU PARTICIPATE IN STRENOUS SPORTS LIKE SWIMMING, SINGLES TENNIS, FOOTBALL, BASKETBALL, OR SKIING: NO
CAN YOU DO LIGHT WORK AROUND THE HOUSE LIKE DUSTING OR WASHING DISHES: YES
CAN YOU WALK A BLOCK OR TWO ON LEVEL GROUND: NO
DASI METS SCORE: 4.3

## 2025-04-14 ASSESSMENT — PAIN SCALES - GENERAL
PAINLEVEL_OUTOF10: 3
PAINLEVEL_OUTOF10: 3

## 2025-04-14 ASSESSMENT — PAIN - FUNCTIONAL ASSESSMENT: PAIN_FUNCTIONAL_ASSESSMENT: 0-10

## 2025-04-14 NOTE — H&P (VIEW-ONLY)
CPM/PAT Evaluation       Name: Hugo Gauthier (Hugo Gauthier)  /Age: 1960/64 y.o.     Visit Type:   In-Person       Chief Complaint: right kidney stone    HPI 63 y/o male scheduled for cystoscopy w/lithotripsy on 2024 with  Dr. Taylor secondary to right kidney stone.  PMHX includes HFpEF, CAD, RADHA, HTN PAD s/p stent 2023.  PAT is consulted today for perioperative risk stratification and optimization.      Past Medical History:   Diagnosis Date    (HFpEF) heart failure with preserved ejection fraction     Arrhythmia     Chronic venous stasis dermatitis     Clotting disorder (Multi)     Coronary artery disease involving native coronary artery of native heart 10/20/2023    Disease of thyroid gland     DVT (deep venous thrombosis) (Multi)     LLE    Hydronephrosis     Hypertension     Hypogonadism in male     Lymphedema     May-Thurner syndrome     Morbid obesity (Multi)     Paroxysmal A-fib (Multi)     pt denies    Personal history of diseases of the blood and blood-forming organs and certain disorders involving the immune mechanism 10/19/2021    History of iron deficiency anemia    Personal history of diseases of the blood and blood-forming organs and certain disorders involving the immune mechanism 10/19/2021    History of anemia    PVD (peripheral vascular disease) ([facility])     Pyelonephritis     Right kidney stone     Sleep apnea     does not wear CPAP    Type 2 diabetes mellitus     Use of cane as ambulatory aid     Venous insufficiency     Wears glasses     Wound of left leg        Past Surgical History:   Procedure Laterality Date    CARDIAC CATHETERIZATION N/A 10/13/2023    Procedure: Left Heart Cath;  Surgeon: Jude Lamas MD;  Location: [facility] [place] [facility];  Service: Cardiovascular;  Laterality: N/A;  BLAINE ESQUEDA / MORALES DOING    CYSTOSCOPY      with stent    ILIAC VEIN ANGIOPLASTY / STENTING Left        Patient Sexual activity questions deferred to the physician.    No family history  on file.    No Known Allergies    Prior to Admission medications    Medication Sig Start Date End Date Taking? Authorizing Provider   acetaminophen (Tylenol) 500 mg tablet Take 2 tablets (1,000 mg) by mouth every 8 hours.  Patient not taking: Reported on 4/14/2025 3/31/25   Louise Varma DO   alpha tocopherol (Vitamin E) 268 mg (400 unit) capsule Take by mouth.    Historical Provider, MD   ascorbic acid (Vitamin C) 1,000 mg tablet Take 1 tablet (1,000 mg) by mouth once daily. 10/4/19   Historical Provider, MD   cholecalciferol (Vitamin D-3) 50 MCG (2000 UT) tablet Take 1 tablet (50 mcg) by mouth once daily. 10/4/19   Historical Provider, MD   ciprofloxacin (Cipro) 500 mg tablet Take 1 tablet (500 mg) by mouth 2 times a day for 10 days.  Patient not taking: Reported on 4/14/2025 3/31/25 4/10/25  Louise Varma DO   dilTIAZem CD (Cardizem CD) 120 mg 24 hr capsule TAKE 1 CAPSULE(120 MG) BY MOUTH EVERY DAY 7/19/24   Marie Gee, APRN-CNP   folic acid (Folvite) 800 mcg tablet Take 1 tablet (800 mcg) by mouth once daily. 10/4/19   Historical Provider, MD   iodine, bulk, crystals USE AS DIRECTED. 10/4/19   Historical Provider, MD   lisinopril 20 mg tablet Take 1 tablet (20 mg) by mouth once daily. 6/19/24 6/19/25  Edilma Luna MD   magnesium oxide (Mag-Ox) 400 mg tablet Take 1 tablet (400 mg) by mouth once daily. 10/4/19   Historical Provider, MD   methocarbamol (Robaxin) 500 mg tablet Take 2 tablets (1,000 mg) by mouth every 8 hours.  Patient not taking: Reported on 4/14/2025 3/31/25   Louise Varma DO   oxyCODONE (Roxicodone) 5 mg immediate release tablet Take 1 tablet (5 mg) by mouth every 12 hours if needed for severe pain (7 - 10). 3/31/25   Louise Varma DO   SantyL 250 unit/gram ointment  6/21/24   Historical Provider, MD   selenium 200 mcg tablet Take 1 tablet (200,000 mcg) by mouth once daily. 10/4/19   Historical Provider, MD   sennosides-docusate sodium (Dania-Colace) 8.6-50 mg tablet Take 2 tablets by  mouth 2 times a day. 3/31/25   Senyo Agidi, DO   spironolactone (Aldactone) 25 mg tablet TAKE 1 TABLET BY MOUTH ONCE DAILY 1/15/25   SAMIR Justice-CNP   tamsulosin (Flomax) 0.4 mg 24 hr capsule Take 1 capsule (0.4 mg) by mouth once daily. 3/31/25   Senyo Agidi, DO   warfarin (Coumadin) 1 mg tablet TAKE 1 TABLET BY MOUTH EVERY DAY 12/7/23   Edilma Luna MD   warfarin (Coumadin) 4 mg tablet TAKE 1 TABLET EVERY DAY 3/17/25   Edilma Luna MD   zinc gluconate 100 mg tablet Take 1 tablet (100 mg) by mouth once daily. 5/22/23   Edilma Luna MD        PAT ROS:   Constitutional:   neg    Neuro/Psych:   neg    Eyes:    use of corrective lenses  Ears:   neg    Nose:   Mouth:   Throat:   Neck:   Cardio:   neg    Respiratory:   neg    Endocrine:   GI:   neg    :   neg    Musculoskeletal:   neg    Hematologic:   neg    Skin:      Physical Exam  Vitals reviewed.   Constitutional:       Appearance: Normal appearance. He is obese.   HENT:      Mouth/Throat:      Mouth: Mucous membranes are moist.      Pharynx: Oropharynx is clear.   Eyes:      Pupils: Pupils are equal, round, and reactive to light.   Cardiovascular:      Rate and Rhythm: Normal rate and regular rhythm.      Pulses: Normal pulses.      Heart sounds: Normal heart sounds.   Pulmonary:      Effort: Pulmonary effort is normal.      Breath sounds: Normal breath sounds.   Abdominal:      General: Bowel sounds are normal.      Palpations: Abdomen is soft.   Musculoskeletal:         General: Normal range of motion.      Cervical back: Normal range of motion and neck supple.   Skin:     General: Skin is warm and dry.   Neurological:      General: No focal deficit present.      Mental Status: He is alert and oriented to person, place, and time.   Psychiatric:         Mood and Affect: Mood normal.         Behavior: Behavior normal.          Airway    Testing/Diagnostic:     Patient Specialist/PCP:     Visit Vitals  /73   Pulse 82   Temp 36.6  "°C (97.9 °F)   Resp 18   Ht 1.778 m (5' 10\")   Wt 147 kg (323 lb 3.1 oz)   SpO2 96%   BMI 46.37 kg/m²   Smoking Status Former   BSA 2.69 m²       DASI Risk Score      Flowsheet Row Pre-Admission Testing from 4/14/2025 in [facility]   Can you take care of yourself (eat, dress, bathe, or use toilet)?  2.75 filed at 04/14/2025 1359   Can you walk indoors, such as around your house? 1.75 filed at 04/14/2025 1359   Can you walk a block or two on level ground?  0 filed at 04/14/2025 1359   Can you climb a flight of stairs or walk up a hill? 5.5 filed at 04/14/2025 1359   Can you run a short distance? 0 filed at 04/14/2025 1359   Can you do light work around the house like dusting or washing dishes? 2.7 filed at 04/14/2025 1359   Can you do moderate work around the house like vacuuming, sweeping floors or carrying groceries? 0 filed at 04/14/2025 1359   Can you do heavy work around the house like scrubbing floors or lifting and moving heavy furniture?  0 filed at 04/14/2025 1359   Can you do yard work like raking leaves, weeding or pushing a mower? 0 filed at 04/14/2025 1359   Can you have sexual relations? 0 filed at 04/14/2025 1359   Can you participate in moderate recreational activities like golf, bowling, dancing, doubles tennis or throwing a baseball or football? 0 filed at 04/14/2025 1359   Can you participate in strenous sports like swimming, singles tennis, football, basketball, or skiing? 0 filed at 04/14/2025 1359   DASI SCORE 12.7 filed at 04/14/2025 1359   METS Score (Will be calculated only when all the questions are answered) 4.3 filed at 04/14/2025 1359          Caprini DVT Assessment      Flowsheet Row Pre-Admission Testing from 4/14/2025 in [facility] Admission (Discharged) from 3/29/2025 in [facility] with Louise Varma DO   DVT Score (IF A SCORE IS NOT CALCULATING, MUST SELECT A BMI TO COMPLETE) 6 filed at 04/14/2025 1426 5 filed at 03/29/2025 1138 "   Surgical Factors Minor surgery planned filed at 04/14/2025 1426 --   BMI (BMI MUST BE CHOSEN) 41-50 (Morbid obesity) filed at 04/14/2025 1426 41-50 (Morbid obesity) filed at 03/29/2025 1138          Modified Frailty Index    No data to display       [id number] Stroke Risk Points  Current as of just now        4 0 to 9 Points:      Last Change:           The [id number] risk score (Lip CHRISTIANO, et al. 2009. © 2010 [employer]) quantifies the risk of stroke for a patient with atrial fibrillation. For patients without atrial fibrillation or under the age of 18 this score appears as N/A. Higher score values generally indicate higher risk of stroke.          Points Metrics   1 Has Congestive Heart Failure:  Yes     Patients with congestive heart failure get 1 point.    Current as of just now   1 Has Hypertension:  Yes     Patients with hypertension get 1 point.    Current as of just now   0 Age:  64     Patients 65 to 74 years old get 1 point, or patients 75 years and older get 2 points.    Current as of just now   1 Has Diabetes:  Yes     Patients with diabetes get 1 point.    Current as of just now   0 Had Stroke:  No  Had TIA:  No  Had Thromboembolism:  No     Patients who have had a stroke, TIA, or thromboembolism get 2 points.    Current as of just now   1 Has Vascular Disease:  Yes     Patients with vascular disease get 1 point.    Current as of just now   0 Clinically Relevant Sex:  Male     Patients with a clinically relevant sex of Female get 1 point.    Current as of just now             Revised Cardiac Risk Index      Flowsheet Row Pre-Admission Testing from 4/14/2025 in [facility]   High-Risk Surgery (Intraperitoneal, Intrathoracic,Suprainguinal vascular) 0 filed at 04/14/2025 1451   History of ischemic heart disease (History of MI, History of positive exercuse test, Current chest paint considered due to myocardial ischemia, Use of nitrate therapy, ECG with pathological  Q Waves) 0 filed at 04/14/2025 1451   History of congestive heart failure (pulmonary edemia, bilateral rales or S3 gallop, Paroxysmal nocturnal dyspnea, CXR showing pulmonary vascular redistribution) 0 filed at 04/14/2025 1451   History of cerebrovascular disease (Prior TIA or stroke) 0 filed at 04/14/2025 1451   Pre-operative insulin treatment 0 filed at 04/14/2025 1451   Pre-operative creatinine>2 mg/dl 0 filed at 04/14/2025 1451   Revised Cardiac Risk Calculator 0 filed at 04/14/2025 1451          Apfel Simplified Score      Flowsheet Row Pre-Admission Testing from 4/14/2025 in [facility]   Smoking status 1 filed at 04/14/2025 1427   History of motion sickness or PONV  0 filed at 04/14/2025 1427   Use of postoperative opioids 1 filed at 04/14/2025 1427   Gender - Female 0=No filed at 04/14/2025 1427   Apfel Simplified Score Calculator 2 filed at 04/14/2025 1427          Risk Analysis Index Results This Encounter    No data found in the last 10 encounters.       Stop Bang Score      Flowsheet Row Pre-Admission Testing from 4/14/2025 in [facility] Admission (Discharged) from 11/20/2023 in [facility] OR with Franky Camejo, DO   Do you snore loudly? 1 filed at 04/14/2025 1358 1 filed at 11/20/2023 0846   Do you often feel tired or fatigued after your sleep? 1 filed at 04/14/2025 1358 1 filed at 11/20/2023 0846   Has anyone ever observed you stop breathing in your sleep? 1 filed at 04/14/2025 1358 1 filed at 11/20/2023 0846   Do you have or are you being treated for high blood pressure? 1 filed at 04/14/2025 1358 1 filed at 11/20/2023 0846   Recent BMI (Calculated) 44.8 filed at 04/14/2025 1358 52.4 filed at 11/20/2023 0846   Is BMI greater than 35 kg/m2? 1=Yes filed at 04/14/2025 1358 1=Yes filed at 11/20/2023 0846   Age older than 50 years old? 1=Yes filed at 04/14/2025 1358 1=Yes filed at 11/20/2023 0846   Is your neck circumference greater than 17 inches (Male) or 16  inches (Female)? 1 filed at 04/14/2025 1358 1 filed at 11/20/2023 0846   Gender - Male 1=Yes filed at 04/14/2025 1358 1=Yes filed at 11/20/2023 0846   STOP-BANG Total Score 8 filed at 04/14/2025 1358 8 filed at 11/20/2023 0846          Prodigy: High Risk  Total Score: 19              Prodigy Age Score      Prodigy Gender Score     Prodigy Previous Opioid Use Score           ARISCAT Score for Postoperative Pulmonary Complications      Flowsheet Row Pre-Admission Testing from 4/14/2025 in [facility]   Age Calculated Score 3 filed at 04/14/2025 1453   Preoperative SpO2 0 filed at 04/14/2025 1453   Respiratory infection in the last month Either upper or lower (i.e., URI, bronchitis, pneumonia), with fever and antibiotic treatment 0 filed at 04/14/2025 1453   Preoperative anemia (Hgb less than 10 g/dl) 0 filed at 04/14/2025 1453   Surgical incision  0 filed at 04/14/2025 1453   Duration of surgery  0 filed at 04/14/2025 1453   Emergency Procedure  0 filed at 04/14/2025 1453   ARISCAT Total Score  3 filed at 04/14/2025 1453          Torsten Perioperative Risk for Myocardial Infarction or Cardiac Arrest (EVE)      Flowsheet Row Pre-Admission Testing from 4/14/2025 in [facility]   Calculated Age Score 1.24 filed at 04/14/2025 1453   Functional Status  0 filed at 04/14/2025 1453   ASA Class  -3.29 filed at 04/14/2025 1453   Creatinine 0 filed at 04/14/2025 1453   Type of Procedure  -0.26 filed at 04/14/2025 1453   EVE Total Score  -7.56 filed at 04/14/2025 1453   EVE % 0.05 filed at 04/14/2025 1453                  Assessment and Plan:     HPI 65 y/o male scheduled for cystoscopy w/lithotripsy on 4/24/2024 with  Dr. Taylor secondary to right kidney stone.  PMHX includes HFpEF, CAD, RADHA, HTN PAD s/p stent 11/2023.  PAT is consulted today for perioperative risk stratification and optimization.    Neuro:  No neurologic diagnosis, however, the patient is at increased risk for perioperative delirium  secondary to  age, polypharmacy  Patient is at increased risk for perioperative CVA secondary to  perioperative interruption of anticoagulant, HTN    HEENT:  No HEENT diagnosis or significant findings on chart review or clinical presentation and evaluation. No further preoperative testing/intervention indicated at this time.    Cardiovascular:  Seen by Dr. Medeiros on 1/27/2025 for HFpEF  [facility] 10/2023:  minimal Nonobstructive CAD  Continue diltiazem  Holding lisinopril day of procedure  METS: 4.3  RCRI: 0 points, 3.9%  risk for postoperative MACE       Vascular Surgery  Seen by Dr. Camejo on 3/25/2025 for PAD  Holding coumdin 5 days prior to procedure    Pulmonary:  No pulmonary diagnosis, however patient is at increased risk of perioperative complications secondary to  age > 60, obesity  RADHA but does not use CPAP   Stop Bang score is 8 placing patient at high risk for RADHA  PRODIGY: High risk for opioid induced respiratory depression  Pumonary education discussed, patient also provided deep breathing exerciseswith educational handout    Renal:   No renal diagnosis, however patient is at increase risk for perioperative renal complications secondary to  Age equal to or greater than 56, BMI equal to or greater than 30      Endocrine:  No endocrine diagnosis or significant findings on chart review or clinical presentation and evaluation. No further testing or intervention is indicated at this time.    Hematologic:  No hematologic diagnosis, however patient is at an increased risk for DVT  Caprini Score 6, patient at High risk for perioperative DVT.  Patient provided with VTE education/handout.    Gastrointestinal:   No GI diagnosis or significant findings on chart review or clinical presentation and evaluation.   Apfel 2    Urology  Seen by Dr. Taylor on 4/7/2024 for hydronephrosis of right kidney  Plan for lithotripsy with cystoscopy    Infectious disease:   No infectious diagnosis or significant findings on chart review  or clinical presentation and evaluation.       Musculoskeletal:   No diagnosis or significant findings on chart review or clinical presentation and evaluation.     Anesthesia/Airway:  No anesthesia complications      Medication instructions and NPO guidelines reviewed with the patient.  All questions or concerns discussed and addressed.

## 2025-04-14 NOTE — PATIENT INSTRUCTIONS
Pt does not want to repeat sleep study at this time  Lose weight  Consider repeat PAP titration later    Rtc 6 mo

## 2025-04-14 NOTE — PROGRESS NOTES
Follow-up visit    Visit type: Follow-up    PCP: Edilma Luna MD.    Subjective     Hugo Gauthier is a 64 y.o. year old male here for follow-up. Last seen 8/12/24.     Patient came with spouse.       Sleeping Problem    I first saw him 11/8/23 for RADHA. States he was having some chronic wound issues in [place]. Saw vascular surgery, who referred him to see cardiologist Dr Medeiros. Among other things, he ordered sleep study. Sleep study done, pt referred here. Tired/sleepy during the daytime. Obese, BMI 50+. Able to sleep supine only per pt due to habitus. Can fall asleep while seated at edge of bed sometimes. (+) drowsy driving. Had 1 sleep study done. States he didn't sleep well. When travelling in past usually cannot sleep well first night in the hotel. Pt states leg movements sometimes a problem sometimes not during sleep. Former smoker. No alcohol/no street drug use. Pt diagnosed as having severe RADHA with hypoxemia, with PAP emergent central sleep apnea and unsuccessful PAP titration to bilevel PAP ASV. Last visit, discussed options and serious weight loss. Pt opted to lose more weight first.    Studies so far:  9/28/23 Split night PSG ([place], BMI 56.7, ESS 23), all-supine, severe RADHA, no sig hypoxemia. No optimal pressure up to CPAP 20 cm H20 (all hypopneas), switched to biPAP, centrals emerged (last obs ap at EPAP 14 cm H20--? mixed apnea); no optimal pressure noted. (+) PLMs noted.     12/18/23 BiPAP study ([place], BMI 53, ESS 23) showed unsuccessful titration up to bilevel PAP titration and bilevel PAP S/T titration 28/24 cm H20 with BUR 10 in this all-supine study. Treatment-emergent central sleep apnea noted. No PLMs. Pt couldn't tolerate higher starting pressure.     1/26/24 ASV study ([place], BMI 51, ESS 23).  Unsuccessful titration, all-supine, with HOB elevated up to 20 degrees. Sleep-related hypoxemia present during study. PLMs noted (PLM index 83.5).     Weight 2/2/24 355 lbs, last visit 8/2024 323  lbs. During last visit, he was to try to lose weight some more and revisit possibly retesting for PAP titration.    Here today for follow-up.    Down to 316 lbs, then gained some back. Now at 321 lbs.    Admitted for pyelonephritis end March 2025, stented. Has kidney stone. Will need more procedures. Has pain in R flank preventing some activity. Can't do a lot of exercising right now due to kidney.    Sleeps sometimes sitting up still.    Wants to lose more weight.    Not falling asleep anymore at work.    Usual bedtime: 8p  Usual SOL: very quick  Once asleep, sleeps thru the night, when urinates no problem going back to sleep  Usual wake up time: 4a      Patient Active Problem List   Diagnosis    Atrial fibrillation (Multi)    Anemia due to blood loss    Chronic venous stasis dermatitis of both lower extremities    History of hyperthyroidism    HTN (hypertension)    Chronic bilateral low back pain without sciatica    Lesion of lower extremity    Lymphedema    Onychomycosis of toenail    Ulcer, venous stasis (Multi)    Diet-controlled diabetes mellitus (Multi)    Class 3 severe obesity due to excess calories with serious comorbidity and body mass index (BMI) of 45.0 to 49.9 in adult    Other fatigue    Hypogonadism male    SOBOE (shortness of breath on exertion)    Chronic fatigue    Daytime somnolence    Morbid obesity (Multi)    Urinary frequency    Tinea cruris    Test anxiety    Poison ivy dermatitis    Nevus of scalp    May-Thurner syndrome    Actinic keratosis    Cutaneous horn    Dysuria    Blood in urine    Abnormal nuclear stress test    History of iron deficiency anemia    Anticoagulated by anticoagulation treatment    Coronary artery disease involving native coronary artery of native heart    (HFpEF) heart failure with preserved ejection fraction    Other chest pain    RADHA (obstructive sleep apnea)    Preoperative cardiovascular examination    Central sleep apnea    Periodic limb movements of sleep     Iliac vein stenosis, right    Pubic lice    Low back pain    Candidiasis of intestine    Muscle spasm of right lower extremity    Erectile dysfunction    Lumbar spinal stenosis    Acute urinary tract infection    Cellulitis of lower extremity    Iron deficiency anemia    Local infection of wound    Open wound of hand    Dermatitis    Anemia    Chronic low back pain    Fatigue    Clot    Hypertension    Benign neoplasm of scalp    Contact dermatitis due to poison ivy    Dyspnea on exertion    Anxiety    Skin ulcer (Multi)    Increased frequency of urination    Pyelonephritis    Right kidney stone       No Known Allergies    Current Outpatient Medications:     alpha tocopherol (Vitamin E) 268 mg (400 unit) capsule, Take by mouth., Disp: , Rfl:     ascorbic acid (Vitamin C) 1,000 mg tablet, Take 1 tablet (1,000 mg) by mouth once daily., Disp: , Rfl:     cholecalciferol (Vitamin D-3) 50 MCG (2000 UT) tablet, Take 1 tablet (50 mcg) by mouth once daily., Disp: , Rfl:     dilTIAZem CD (Cardizem CD) 120 mg 24 hr capsule, TAKE 1 CAPSULE(120 MG) BY MOUTH EVERY DAY, Disp: 90 capsule, Rfl: 3    folic acid (Folvite) 800 mcg tablet, Take 1 tablet (800 mcg) by mouth once daily., Disp: , Rfl:     iodine, bulk, crystals, USE AS DIRECTED., Disp: , Rfl:     lisinopril 20 mg tablet, Take 1 tablet (20 mg) by mouth once daily., Disp: 90 tablet, Rfl: 3    magnesium oxide (Mag-Ox) 400 mg tablet, Take 1 tablet (400 mg) by mouth once daily., Disp: , Rfl:     oxyCODONE (Roxicodone) 5 mg immediate release tablet, Take 1 tablet (5 mg) by mouth every 12 hours if needed for severe pain (7 - 10)., Disp: 15 tablet, Rfl: 0    SantyL 250 unit/gram ointment, , Disp: , Rfl:     selenium 200 mcg tablet, Take 1 tablet (200,000 mcg) by mouth once daily., Disp: , Rfl:     sennosides-docusate sodium (Dania-Colace) 8.6-50 mg tablet, Take 2 tablets by mouth 2 times a day., Disp: 120 tablet, Rfl: 0    spironolactone (Aldactone) 25 mg tablet, TAKE 1 TABLET BY  MOUTH ONCE DAILY, Disp: 180 tablet, Rfl: 1    tamsulosin (Flomax) 0.4 mg 24 hr capsule, Take 1 capsule (0.4 mg) by mouth once daily., Disp: 30 capsule, Rfl: 1    warfarin (Coumadin) 1 mg tablet, TAKE 1 TABLET BY MOUTH EVERY DAY, Disp: 30 tablet, Rfl: 0    warfarin (Coumadin) 4 mg tablet, TAKE 1 TABLET EVERY DAY, Disp: 30 tablet, Rfl: 11    zinc gluconate 100 mg tablet, Take 1 tablet (100 mg) by mouth once daily., Disp: , Rfl: 0    acetaminophen (Tylenol) 500 mg tablet, Take 2 tablets (1,000 mg) by mouth every 8 hours. (Patient not taking: Reported on 4/14/2025), Disp: 120 tablet, Rfl: 0    methocarbamol (Robaxin) 500 mg tablet, Take 2 tablets (1,000 mg) by mouth every 8 hours. (Patient not taking: Reported on 4/14/2025), Disp: 60 tablet, Rfl: 0     Objective     Wt 146 kg (321 lb 14.4 oz)   BMI 46.19 kg/m²        Awake, alert, oriented x3, in no distress  Well-nourished, ambulatory independently with cane    Mental status exam as above, conversant   Full EOMs intact, no nystagmus, no ptosis   No facial droop   Hearing grossly intact   No dysarthria    Motor strength is at least antigravity on all extremities  Normal gait      Lab Results   Component Value Date    WBC 8.1 03/31/2025    RBC 3.89 (L) 03/31/2025    HGB 10.6 (L) 03/31/2025    HCT 35.7 (L) 03/31/2025     03/31/2025     03/31/2025    K 4.3 03/31/2025     03/31/2025    BUN 19 03/31/2025    CREATININE 1.09 03/31/2025    EGFR 77 03/31/2025    CALCIUM 8.2 (L) 03/31/2025    ALKPHOS 65 03/28/2025    AST 22 03/28/2025    ALT 24 03/28/2025    MG 1.92 03/31/2025    HGBA1C 5.9 (H) 07/29/2024    LDLCALC 54 07/29/2024    CHOL 154 07/29/2024    HDL 52.7 07/29/2024    TRIG 236 (H) 07/29/2024    TSH 0.86 07/29/2024        Assessment/Plan   RADHA, severe 2023  Central sleep apnea, PAP-emergent 2023  Complex sleep apnea  EF normal  Severe RADHA on diagnostic PSG, no sig hypoxemia noted  No optimal pressure noted even up to BiPAP/BiPAP S/T and ASV titration  (even with HOB up 20 deg)--all supine  BMI 56-->now 46  Pt only able to sleep spine (UNABLE to sleep non-supine due to habitus--per pt, his belly)    Since last visit lost 2 lbs. Hardly changed.    Discussed with pt, repeating sleep study now might not yield a different outcome. He still can sleep supine.     Pt still not done with weight loss. He is determined to lose more weight and wants to wait further before redoing a PAP titration study again.    Discussed, on one hand we want his RADHA controlled. But so far studies have been unsuccessful in getting him a good pressure due to severity/weight.  He is not sleepy.    4. Morbid obesity  Motivated to lose weight and is losing weight    5. Periodic limb movements of sleep  Asymptomatic      Plans:  Pt does not want to repeat sleep study at this time  Lose weight  Consider repeat PAP titration later    Rtc 6 mo    All questions were answered.  Pt knows how to contact my office in case pt has any questions or concerns.    Gerald Hess MD

## 2025-04-14 NOTE — PREPROCEDURE INSTRUCTIONS
Thank you for visiting [facility] ([facility]) today for your pre-procedure evaluation, you were seen by     Sanjana Villalpando CNP  Pre Admission Testing  [facility]  [phone]    This summary includes instructions and information to aid you during your perioperative period.  Please read carefully. If you have any questions about your visit today, please call the number listed above.  If you become ill or have any changes to your health before your surgery, please contact your primary care provider and alert your surgeon.      Preparing for your Surgery       Exercises  Preoperative Deep Breathing Exercises  Why it is important to do deep breathing exercises before my surgery?  Deep breathing exercises strengthen your breathing muscles.  This helps you to recover after your surgery and decreases the chance of breathing complications.  How are the deep breathing exercises done?  Sit straight with your back supported.  Breathe in deeply and slowly through your nose. Your lower rib cage should expand and your abdomen may move forward.  Hold that breath for 3 to 5 seconds.  Breathe out through pursed lips, slowly and completely.  Rest and repeat 10 times every hour while awake.  Rest longer if you become dizzy or lightheaded.       Preoperative Brain Exercises    What are brain exercises?  A brain exercise is any activity that engages your thinking (cognitive) skills.    What types of activities are considered brain exercises?  Jigsaw puzzles, crossword puzzles, word jumble, memory games, word search, and many more.  Many can be found free online or on your phone via a mobile jaja.    Why should I do brain exercises before my surgery?  More recent research has shown brain exercise before surgery can lower the risk of postoperative delirium (confusion) which can be especially important for older adults.  Patients who did brain exercises for 5 to 10 hours the days before surgery, cut their risk  of postoperative delirium in half up to 1 week after surgery.    Sit-to-Stand Exercise    What is the sit-to-stand exercise?  The sit-to-stand exercise strengthens the muscles of your lower body and muscles in the center of your body (core muscles for stability) helping to maintain and improve your strength and mobility.  How do I do the sit-to-stand exercise?  The goal is to do this exercise without using your arms or hands.  If this is too difficult, use your arms and hands or a chair with armrests to help slowly push yourself to the standing position and lower yourself back to the sitting position. As the movement becomes easier use your arms and hands less.    Steps to the sit-to-stand exercise  Sit up tall in a sturdy chair, knees bent, feet flat on the floor shoulder-width apart.  Shift your hips/pelvis forward in the chair to correctly position yourself for the next movement.  Lean forward at your hips.  Stand up straight putting equal weight on both feet.  Check to be sure you are properly aligned with the chair, in a slow controlled movement sit back down.  Repeat this exercise 10-15 times.  If needed you can do it fewer times until your strength improves.  Rest for 1 minute.  Do another 10-15 sit-to-stand exercises.  Try to do this in the morning and evening.        Instructions    Preoperative Fasting Guidelines    Why must I stop eating and drinking near surgery time?  With sedation, food or liquid in your stomach can enter your lungs causing serious complications  Food can increase nausea and vomiting  When do I need to stop eating and drinking before my surgery?      Do not eat any food or drink any liquids after midnight the night before your surgery/procedure.  You may have sips of water to take medications.            Simple things you can do to help prevent blood clots     Blood clots are blockages that can form in the body's veins. When a blood clot forms in your deep veins, it may be called a  deep vein thrombosis, or DVT for short. Blood clots can happen in any part of the body where blood flows, but they are most common in the arms and legs. If a piece of a blood clot breaks free and travels to the lungs, it is called a pulmonary embolus (PE). A PE can be a very serious problem.         Being in the hospital or having surgery can raise your chances of getting a blood clot because you may not be well enough to move around as much as you normally do.         Ways you can help prevent blood clots in the hospital       Wearing SCDs  SCDs stands for Sequential Compression Devices.   SCDs are special sleeves that wrap around your legs. They attach to a pump that fills them with air to gently squeeze your legs every few minutes.  This helps return the blood in your legs to your heart.   SCDs should only be taken off when walking or bathing. SCDs may not be comfortable, but they can help save your life.              Pump SCD leg sleeves  Wearing compression stockings - if your doctor orders them. These special snug-fitting stockings gently squeeze your legs to help blood flow.       Walking. Walking helps move the blood in your legs.   If your doctor says it is ok, try walking the halls at least   5 times a day. Ask us to help you get up, so you don't fall.      Taking any blood-thinning medicines your doctor orders.              Ways you can help prevent blood clots at home         Wearing compression stockings - if your doctor orders them.   Walking - to help move the blood in your legs.    Taking any blood-thinning medicines your doctor orders.      Signs of a blood clot or PE    Tell your doctor or nurse right away if you have any of the problems listed below.         If you are at home, seek medical care right away. Call 911 for chest pain or problems breathing.            Signs of a blood clot (DVT) - such as pain, swelling, redness, or warmth in your arm or legs.  Signs of a pulmonary embolism (PE) -  such as chest pain or feeling short of breath      Tobacco and Alcohol;  Do not drink alcohol or smoke within 24 hours of surgery.  It is best to quit smoking for as long as possible before any surgery or procedure.        The Week before Surgery        Seven days before Surgery  Check your PAT medication instructions  Do the exercises provided to you by PAT  Arrange for a responsible, adult licensed  to take you home after surgery and stay with you for 24 hours.  You will not be permitted to drive yourself home if you have received any anesthetic/sedation  Six days before surgery  Check your PAT medication instructions  Do the exercises provided to you by PAT  Start using Chlorhexidene (CHG) body wash if prescribed  Five days before surgery  Check your PAT medication instructions  Do the exercises provided to you by PAT  Continue to use CHG body wash if prescribed  Three days before surgery  Check your PAT medication instructions  Do the exercises provided to you by PAT  Continue to use CHG body wash if prescribed  Two days before surgery  Check your PAT medication instructions  Do the exercises provided to you by PAT  Continue to use CHG body wash if prescribed    The Day before Surgery       Check your PAT medication and all other PAT instructions including when to stop eating and drinking  You will be called with your arrival time for surgery in the late afternoon.  If you do not receive a call please reach out to [facility]. [phone]  Do not smoke or drink 24 hours before surgery  Prepare items to bring with you to the hospital  Shower with your chlorhexidine wash if prescribed  Brush your teeth and use your chlorhexidine dental rinse if prescribed    The Day of Surgery       Check your PAT medication instructions  Ensure you follow the instructions for when to stop eating and drinking  Shower, if prescribed use CHG.  Do not apply any lotions, creams, moisturizers, perfume or deodorant  Brush your  teeth and use your CHG dental rinse if prescribed  Wear loose comfortable clothing  Avoid make-up  Remove  jewelry and piercings, consider professional piercing removal with a plastic spacer if needed  Bring photo ID and Insurance card  Bring an accurate medication list that includes medication dose, frequency and allergies  Bring a copy of your advanced directives (will, health care power of )  Bring any devices and controllers as well as medical devices you have been provided with for surgery (CPAP, slings, braces, etc.)  Dentures, eyeglasses, and contacts will be removed before surgery, please bring cases for contacts or glasses

## 2025-04-14 NOTE — CPM/PAT H&P
CPM/PAT Evaluation       Name: Hugo Gauthier (Hugo Gauthier)  /Age: 1960/64 y.o.     Visit Type:   In-Person       Chief Complaint: right kidney stone    HPI 63 y/o male scheduled for cystoscopy w/lithotripsy on 2024 with  Dr. Taylor secondary to right kidney stone.  PMHX includes HFpEF, CAD, RADHA, HTN PAD s/p stent 2023.  PAT is consulted today for perioperative risk stratification and optimization.      Past Medical History:   Diagnosis Date    (HFpEF) heart failure with preserved ejection fraction     Arrhythmia     Chronic venous stasis dermatitis     Clotting disorder (Multi)     Coronary artery disease involving native coronary artery of native heart 10/20/2023    Disease of thyroid gland     DVT (deep venous thrombosis) (Multi)     LLE    Hydronephrosis     Hypertension     Hypogonadism in male     Lymphedema     May-Thurner syndrome     Morbid obesity (Multi)     Paroxysmal A-fib (Multi)     pt denies    Personal history of diseases of the blood and blood-forming organs and certain disorders involving the immune mechanism 10/19/2021    History of iron deficiency anemia    Personal history of diseases of the blood and blood-forming organs and certain disorders involving the immune mechanism 10/19/2021    History of anemia    PVD (peripheral vascular disease) (CMS-[facility])     Pyelonephritis     Right kidney stone     Sleep apnea     does not wear CPAP    Type 2 diabetes mellitus     Use of cane as ambulatory aid     Venous insufficiency     Wears glasses     Wound of left leg        Past Surgical History:   Procedure Laterality Date    CARDIAC CATHETERIZATION N/A 10/13/2023    Procedure: Left Heart Cath;  Surgeon: Jude Lamas MD;  Location: [facility];  Service: Cardiovascular;  Laterality: N/A;  BLAINE ESQUEDA / MORALES DOING    CYSTOSCOPY      with stent    ILIAC VEIN ANGIOPLASTY / STENTING Left        Patient Sexual activity questions deferred to the physician.    No family history  on file.    No Known Allergies    Prior to Admission medications    Medication Sig Start Date End Date Taking? Authorizing Provider   acetaminophen (Tylenol) 500 mg tablet Take 2 tablets (1,000 mg) by mouth every 8 hours.  Patient not taking: Reported on 4/14/2025 3/31/25   Louise Varma DO   alpha tocopherol (Vitamin E) 268 mg (400 unit) capsule Take by mouth.    Historical Provider, MD   ascorbic acid (Vitamin C) 1,000 mg tablet Take 1 tablet (1,000 mg) by mouth once daily. 10/4/19   Historical Provider, MD   cholecalciferol (Vitamin D-3) 50 MCG (2000 UT) tablet Take 1 tablet (50 mcg) by mouth once daily. 10/4/19   Historical Provider, MD   ciprofloxacin (Cipro) 500 mg tablet Take 1 tablet (500 mg) by mouth 2 times a day for 10 days.  Patient not taking: Reported on 4/14/2025 3/31/25 4/10/25  Louise Varma DO   dilTIAZem CD (Cardizem CD) 120 mg 24 hr capsule TAKE 1 CAPSULE(120 MG) BY MOUTH EVERY DAY 7/19/24   Marie Gee, APRN-CNP   folic acid (Folvite) 800 mcg tablet Take 1 tablet (800 mcg) by mouth once daily. 10/4/19   Historical Provider, MD   iodine, bulk, crystals USE AS DIRECTED. 10/4/19   Historical Provider, MD   lisinopril 20 mg tablet Take 1 tablet (20 mg) by mouth once daily. 6/19/24 6/19/25  Edilma Luna MD   magnesium oxide (Mag-Ox) 400 mg tablet Take 1 tablet (400 mg) by mouth once daily. 10/4/19   Historical Provider, MD   methocarbamol (Robaxin) 500 mg tablet Take 2 tablets (1,000 mg) by mouth every 8 hours.  Patient not taking: Reported on 4/14/2025 3/31/25   Louise Varma DO   oxyCODONE (Roxicodone) 5 mg immediate release tablet Take 1 tablet (5 mg) by mouth every 12 hours if needed for severe pain (7 - 10). 3/31/25   Louise Varma DO   SantyL 250 unit/gram ointment  6/21/24   Historical Provider, MD   selenium 200 mcg tablet Take 1 tablet (200,000 mcg) by mouth once daily. 10/4/19   Historical Provider, MD   sennosides-docusate sodium (Dania-Colace) 8.6-50 mg tablet Take 2 tablets by  mouth 2 times a day. 3/31/25   Senyo Agidi, DO   spironolactone (Aldactone) 25 mg tablet TAKE 1 TABLET BY MOUTH ONCE DAILY 1/15/25   SAMIR Justice-CNP   tamsulosin (Flomax) 0.4 mg 24 hr capsule Take 1 capsule (0.4 mg) by mouth once daily. 3/31/25   Senyo Agidi, DO   warfarin (Coumadin) 1 mg tablet TAKE 1 TABLET BY MOUTH EVERY DAY 12/7/23   Edilma Luna MD   warfarin (Coumadin) 4 mg tablet TAKE 1 TABLET EVERY DAY 3/17/25   Edilma Luna MD   zinc gluconate 100 mg tablet Take 1 tablet (100 mg) by mouth once daily. 5/22/23   Edilma Luna MD        PAT ROS:   Constitutional:   neg    Neuro/Psych:   neg    Eyes:    use of corrective lenses  Ears:   neg    Nose:   Mouth:   Throat:   Neck:   Cardio:   neg    Respiratory:   neg    Endocrine:   GI:   neg    :   neg    Musculoskeletal:   neg    Hematologic:   neg    Skin:      Physical Exam  Vitals reviewed.   Constitutional:       Appearance: Normal appearance. He is obese.   HENT:      Mouth/Throat:      Mouth: Mucous membranes are moist.      Pharynx: Oropharynx is clear.   Eyes:      Pupils: Pupils are equal, round, and reactive to light.   Cardiovascular:      Rate and Rhythm: Normal rate and regular rhythm.      Pulses: Normal pulses.      Heart sounds: Normal heart sounds.   Pulmonary:      Effort: Pulmonary effort is normal.      Breath sounds: Normal breath sounds.   Abdominal:      General: Bowel sounds are normal.      Palpations: Abdomen is soft.   Musculoskeletal:         General: Normal range of motion.      Cervical back: Normal range of motion and neck supple.   Skin:     General: Skin is warm and dry.   Neurological:      General: No focal deficit present.      Mental Status: He is alert and oriented to person, place, and time.   Psychiatric:         Mood and Affect: Mood normal.         Behavior: Behavior normal.          Airway    Testing/Diagnostic:     Patient Specialist/PCP:     Visit Vitals  /73   Pulse 82   Temp 36.6  "°C (97.9 °F)   Resp 18   Ht 1.778 m (5' 10\")   Wt 147 kg (323 lb 3.1 oz)   SpO2 96%   BMI 46.37 kg/m²   Smoking Status Former   BSA 2.69 m²       DASI Risk Score      Flowsheet Row Pre-Admission Testing from 4/14/2025 in [facility]   Can you take care of yourself (eat, dress, bathe, or use toilet)?  2.75 filed at 04/14/2025 1359   Can you walk indoors, such as around your house? 1.75 filed at 04/14/2025 1359   Can you walk a block or two on level ground?  0 filed at 04/14/2025 1359   Can you climb a flight of stairs or walk up a hill? 5.5 filed at 04/14/2025 1359   Can you run a short distance? 0 filed at 04/14/2025 1359   Can you do light work around the house like dusting or washing dishes? 2.7 filed at 04/14/2025 1359   Can you do moderate work around the house like vacuuming, sweeping floors or carrying groceries? 0 filed at 04/14/2025 1359   Can you do heavy work around the house like scrubbing floors or lifting and moving heavy furniture?  0 filed at 04/14/2025 1359   Can you do yard work like raking leaves, weeding or pushing a mower? 0 filed at 04/14/2025 1359   Can you have sexual relations? 0 filed at 04/14/2025 1359   Can you participate in moderate recreational activities like golf, bowling, dancing, doubles tennis or throwing a baseball or football? 0 filed at 04/14/2025 1359   Can you participate in strenous sports like swimming, singles tennis, football, basketball, or skiing? 0 filed at 04/14/2025 1359   DASI SCORE 12.7 filed at 04/14/2025 1359   METS Score (Will be calculated only when all the questions are answered) 4.3 filed at 04/14/2025 1359          Caprini DVT Assessment      Flowsheet Row Pre-Admission Testing from 4/14/2025 in [facility] Admission (Discharged) from 3/29/2025 in [facility] with Louise Varma DO   DVT Score (IF A SCORE IS NOT CALCULATING, MUST SELECT A BMI TO COMPLETE) 6 filed at 04/14/2025 1426 5 filed at 03/29/2025 1138 "   Surgical Factors Minor surgery planned filed at 04/14/2025 1426 --   BMI (BMI MUST BE CHOSEN) 41-50 (Morbid obesity) filed at 04/14/2025 1426 41-50 (Morbid obesity) filed at 03/29/2025 1138          Modified Frailty Index    No data to display       [id number] Stroke Risk Points  Current as of just now        4 0 to 9 Points:      Last Change:           The [id number] risk score (Lip CHRISTIANO, et al. 2009. © 2010 American [employer]) quantifies the risk of stroke for a patient with atrial fibrillation. For patients without atrial fibrillation or under the age of 18 this score appears as N/A. Higher score values generally indicate higher risk of stroke.          Points Metrics   1 Has Congestive Heart Failure:  Yes     Patients with congestive heart failure get 1 point.    Current as of just now   1 Has Hypertension:  Yes     Patients with hypertension get 1 point.    Current as of just now   0 Age:  64     Patients 65 to 74 years old get 1 point, or patients 75 years and older get 2 points.    Current as of just now   1 Has Diabetes:  Yes     Patients with diabetes get 1 point.    Current as of just now   0 Had Stroke:  No  Had TIA:  No  Had Thromboembolism:  No     Patients who have had a stroke, TIA, or thromboembolism get 2 points.    Current as of just now   1 Has Vascular Disease:  Yes     Patients with vascular disease get 1 point.    Current as of just now   0 Clinically Relevant Sex:  Male     Patients with a clinically relevant sex of Female get 1 point.    Current as of just now             Revised Cardiac Risk Index      Flowsheet Row Pre-Admission Testing from 4/14/2025 in [facility]   High-Risk Surgery (Intraperitoneal, Intrathoracic,Suprainguinal vascular) 0 filed at 04/14/2025 1451   History of ischemic heart disease (History of MI, History of positive exercuse test, Current chest paint considered due to myocardial ischemia, Use of nitrate therapy, ECG with pathological  Q Waves) 0 filed at 04/14/2025 1451   History of congestive heart failure (pulmonary edemia, bilateral rales or S3 gallop, Paroxysmal nocturnal dyspnea, CXR showing pulmonary vascular redistribution) 0 filed at 04/14/2025 1451   History of cerebrovascular disease (Prior TIA or stroke) 0 filed at 04/14/2025 1451   Pre-operative insulin treatment 0 filed at 04/14/2025 1451   Pre-operative creatinine>2 mg/dl 0 filed at 04/14/2025 1451   Revised Cardiac Risk Calculator 0 filed at 04/14/2025 1451          Apfel Simplified Score      Flowsheet Row Pre-Admission Testing from 4/14/2025 in [facility]   Smoking status 1 filed at 04/14/2025 1427   History of motion sickness or PONV  0 filed at 04/14/2025 1427   Use of postoperative opioids 1 filed at 04/14/2025 1427   Gender - Female 0=No filed at 04/14/2025 1427   Apfel Simplified Score Calculator 2 filed at 04/14/2025 1427          Risk Analysis Index Results This Encounter    No data found in the last 10 encounters.       Stop Bang Score      Flowsheet Row Pre-Admission Testing from 4/14/2025 in [facility] Admission (Discharged) from 11/20/2023 in [facility] OR with Franky Camejo, DO   Do you snore loudly? 1 filed at 04/14/2025 1358 1 filed at 11/20/2023 0846   Do you often feel tired or fatigued after your sleep? 1 filed at 04/14/2025 1358 1 filed at 11/20/2023 0846   Has anyone ever observed you stop breathing in your sleep? 1 filed at 04/14/2025 1358 1 filed at 11/20/2023 0846   Do you have or are you being treated for high blood pressure? 1 filed at 04/14/2025 1358 1 filed at 11/20/2023 0846   Recent BMI (Calculated) 44.8 filed at 04/14/2025 1358 52.4 filed at 11/20/2023 0846   Is BMI greater than 35 kg/m2? 1=Yes filed at 04/14/2025 1358 1=Yes filed at 11/20/2023 0846   Age older than 50 years old? 1=Yes filed at 04/14/2025 1358 1=Yes filed at 11/20/2023 0846   Is your neck circumference greater than 17 inches (Male) or 16  inches (Female)? 1 filed at 04/14/2025 1358 1 filed at 11/20/2023 0846   Gender - Male 1=Yes filed at 04/14/2025 1358 1=Yes filed at 11/20/2023 0846   STOP-BANG Total Score 8 filed at 04/14/2025 1358 8 filed at 11/20/2023 0846          Prodigy: High Risk  Total Score: 19              Prodigy Age Score      Prodigy Gender Score     Prodigy Previous Opioid Use Score           ARISCAT Score for Postoperative Pulmonary Complications      Flowsheet Row Pre-Admission Testing from 4/14/2025 in [facility] [place]   Age Calculated Score 3 filed at 04/14/2025 1453   Preoperative SpO2 0 filed at 04/14/2025 1453   Respiratory infection in the last month Either upper or lower (i.e., URI, bronchitis, pneumonia), with fever and antibiotic treatment 0 filed at 04/14/2025 1453   Preoperative anemia (Hgb less than 10 g/dl) 0 filed at 04/14/2025 1453   Surgical incision  0 filed at 04/14/2025 1453   Duration of surgery  0 filed at 04/14/2025 1453   Emergency Procedure  0 filed at 04/14/2025 1453   ARISCAT Total Score  3 filed at 04/14/2025 1453          Torsten Perioperative Risk for Myocardial Infarction or Cardiac Arrest (EVE)      Flowsheet Row Pre-Admission Testing from 4/14/2025 in [facility] [place]   Calculated Age Score 1.24 filed at 04/14/2025 1453   Functional Status  0 filed at 04/14/2025 1453   ASA Class  -3.29 filed at 04/14/2025 1453   Creatinine 0 filed at 04/14/2025 1453   Type of Procedure  -0.26 filed at 04/14/2025 1453   EVE Total Score  -7.56 filed at 04/14/2025 1453   EVE % 0.05 filed at 04/14/2025 1453                  Assessment and Plan:     HPI 65 y/o male scheduled for cystoscopy w/lithotripsy on 4/24/2024 with  Dr. Taylor secondary to right kidney stone.  PMHX includes HFpEF, CAD, RADHA, HTN PAD s/p stent 11/2023.  PAT is consulted today for perioperative risk stratification and optimization.    Neuro:  No neurologic diagnosis, however, the patient is at increased risk for perioperative delirium  secondary to  age, polypharmacy  Patient is at increased risk for perioperative CVA secondary to  perioperative interruption of anticoagulant, HTN    HEENT:  No HEENT diagnosis or significant findings on chart review or clinical presentation and evaluation. No further preoperative testing/intervention indicated at this time.    Cardiovascular:  Seen by Dr. Medeiros on 1/27/2025 for HFpEF  [facility] 10/2023:  minimal Nonobstructive CAD  Continue diltiazem  Holding lisinopril day of procedure  METS: 4.3  RCRI: 0 points, 3.9%  risk for postoperative MACE       Vascular Surgery  Seen by Dr. Camejo on 3/25/2025 for PAD  Holding coumdin 5 days prior to procedure    Pulmonary:  No pulmonary diagnosis, however patient is at increased risk of perioperative complications secondary to  age > 60, obesity  RADHA but does not use CPAP   Stop Bang score is 8 placing patient at high risk for RADHA  PRODIGY: High risk for opioid induced respiratory depression  Pumonary education discussed, patient also provided deep breathing exerciseswith educational handout    Renal:   No renal diagnosis, however patient is at increase risk for perioperative renal complications secondary to  Age equal to or greater than 56, BMI equal to or greater than 30      Endocrine:  No endocrine diagnosis or significant findings on chart review or clinical presentation and evaluation. No further testing or intervention is indicated at this time.    Hematologic:  No hematologic diagnosis, however patient is at an increased risk for DVT  Caprini Score 6, patient at High risk for perioperative DVT.  Patient provided with VTE education/handout.    Gastrointestinal:   No GI diagnosis or significant findings on chart review or clinical presentation and evaluation.   Apfel 2    Urology  Seen by Dr. Taylor on 4/7/2024 for hydronephrosis of right kidney  Plan for lithotripsy with cystoscopy    Infectious disease:   No infectious diagnosis or significant findings on chart review  or clinical presentation and evaluation.       Musculoskeletal:   No diagnosis or significant findings on chart review or clinical presentation and evaluation.     Anesthesia/Airway:  No anesthesia complications      Medication instructions and NPO guidelines reviewed with the patient.  All questions or concerns discussed and addressed.

## 2025-04-15 ENCOUNTER — PATIENT OUTREACH (OUTPATIENT)
Dept: PRIMARY CARE | Facility: CLINIC | Age: 65
End: 2025-04-15
Payer: COMMERCIAL

## 2025-04-15 NOTE — PROGRESS NOTES
No contact on 2 wk call back attempt. Message left. No follow up with PCP made within 14 day window

## 2025-04-20 ENCOUNTER — PREP FOR PROCEDURE (OUTPATIENT)
Dept: UROLOGY | Facility: HOSPITAL | Age: 65
End: 2025-04-20
Payer: COMMERCIAL

## 2025-04-21 ENCOUNTER — ANESTHESIA EVENT (OUTPATIENT)
Dept: OPERATING ROOM | Facility: HOSPITAL | Age: 65
End: 2025-04-21
Payer: COMMERCIAL

## 2025-04-21 ENCOUNTER — APPOINTMENT (OUTPATIENT)
Dept: PHARMACY | Facility: HOSPITAL | Age: 65
End: 2025-04-21
Payer: COMMERCIAL

## 2025-04-21 DIAGNOSIS — I50.32 CHRONIC HEART FAILURE WITH PRESERVED EJECTION FRACTION: ICD-10-CM

## 2025-04-21 DIAGNOSIS — E11.9 DIET-CONTROLLED DIABETES MELLITUS (MULTI): ICD-10-CM

## 2025-04-21 NOTE — PROGRESS NOTES
"Pharmacy Clinic Note    Hugo Gauthier is a 64 y.o. male was referred to Clinical Pharmacy Team for diabetes management.     Referring Provider: Edilma Luna MD    Subjective   Allergies[1]    [facility] [address]  [address] [address]  Phone: [phone] Fax: [phone]    [id number] - [facility] access hospital [address] AT [address]  [address] [facility] [address]  Phone: [phone] Fax: [phone]    [facility]  [address] [address]  Phone: [phone] Fax: [phone]      HPI    Objective     There were no vitals taken for this visit.     LAB  Lab Results   Component Value Date    BILITOT 1.1 03/28/2025    CALCIUM 8.2 (L) 03/31/2025    CO2 25 03/31/2025     03/31/2025    CREATININE 1.09 03/31/2025    GLUCOSE 157 (H) 03/31/2025    ALKPHOS 65 03/28/2025    K 4.3 03/31/2025    PROT 6.8 03/28/2025     03/31/2025    AST 22 03/28/2025    ALT 24 03/28/2025    BUN 19 03/31/2025    ANIONGAP 13 03/31/2025    MG 1.92 03/31/2025    PHOS 4.2 03/31/2025    ALBUMIN 2.9 (L) 03/31/2025    LIPASE 47 03/28/2025    GFRMALE >90 08/21/2023     Lab Results   Component Value Date    TRIG 236 (H) 07/29/2024    CHOL 154 07/29/2024    LDLCALC 54 07/29/2024    HDL 52.7 07/29/2024     Lab Results   Component Value Date    HGBA1C 5.9 (H) 07/29/2024     Estimated body mass index is 46.37 kg/m² as calculated from the following:    Height as of 4/14/25: 1.778 m (5' 10\").    Weight as of 4/14/25: 147 kg (323 lb 3.1 oz).    Medications Ordered Prior to Encounter[2]     DRUG INTERACTIONS  - No significant drug-drug interactions exist that require change in therapy  _______________________________________________________________________  PATIENT EDUCATION/GOALS    1. Patient reports that he does not have diabetes or heart failure and does not wish to be followed by the clinical pharmacy team.     2. Answered " all patient questions and concerns to the best of my ability.  _______________________________________________________________________    Assessment/Plan   Problem List Items Addressed This Visit    None     Continue all meds under the continuation of care with the referring provider and clinical pharmacy team.    Alyx Ibanez PharmD     Verbal consent to manage patient's drug therapy was obtained from [the patient and/or an individual authorized to act on behalf of a patient]. They were informed they may decline to participate or withdraw from participation in pharmacy services at any time.         [1] No Known Allergies  [2]   Current Outpatient Medications on File Prior to Visit   Medication Sig Dispense Refill    acetaminophen (Tylenol) 500 mg tablet Take 2 tablets (1,000 mg) by mouth every 8 hours. (Patient not taking: Reported on 4/14/2025) 120 tablet 0    alpha tocopherol (Vitamin E) 268 mg (400 unit) capsule Take by mouth. (Patient not taking: Reported on 4/14/2025)      ascorbic acid (Vitamin C) 1,000 mg tablet Take 1 tablet (1,000 mg) by mouth once daily. (Patient not taking: Reported on 4/14/2025)      cholecalciferol (Vitamin D-3) 50 MCG (2000 UT) tablet Take 1 tablet (50 mcg) by mouth once daily. (Patient not taking: Reported on 4/14/2025)      dilTIAZem CD (Cardizem CD) 120 mg 24 hr capsule TAKE 1 CAPSULE(120 MG) BY MOUTH EVERY DAY 90 capsule 3    folic acid (Folvite) 800 mcg tablet Take 1 tablet (800 mcg) by mouth once daily. (Patient not taking: Reported on 4/14/2025)      iodine, bulk, crystals USE AS DIRECTED. (Patient not taking: Reported on 4/14/2025)      lisinopril 20 mg tablet TAKE 1 TABLET(20 MG) BY MOUTH DAILY 90 tablet 0    magnesium oxide (Mag-Ox) 400 mg tablet Take 1 tablet (400 mg) by mouth once daily.      methocarbamol (Robaxin) 500 mg tablet Take 2 tablets (1,000 mg) by mouth every 8 hours. (Patient not taking: Reported on 4/14/2025) 60 tablet 0    oxyCODONE (Roxicodone) 5 mg  immediate release tablet Take 1 tablet (5 mg) by mouth every 12 hours if needed for severe pain (7 - 10). (Patient not taking: Reported on 4/14/2025) 15 tablet 0    SantyL 250 unit/gram ointment  (Patient not taking: Reported on 4/14/2025)      selenium 200 mcg tablet Take 1 tablet (200,000 mcg) by mouth once daily. (Patient not taking: Reported on 4/14/2025)      sennosides-docusate sodium (Dania-Colace) 8.6-50 mg tablet Take 2 tablets by mouth 2 times a day. 120 tablet 0    spironolactone (Aldactone) 25 mg tablet TAKE 1 TABLET BY MOUTH ONCE DAILY 180 tablet 1    tamsulosin (Flomax) 0.4 mg 24 hr capsule Take 1 capsule (0.4 mg) by mouth once daily. 30 capsule 1    warfarin (Coumadin) 1 mg tablet TAKE 1 TABLET BY MOUTH EVERY DAY (Patient taking differently: Take 0.5 tablets (0.5 mg) by mouth.) 30 tablet 0    warfarin (Coumadin) 4 mg tablet TAKE 1 TABLET EVERY DAY 30 tablet 11    zinc gluconate 100 mg tablet Take 1 tablet (100 mg) by mouth once daily. (Patient not taking: Reported on 4/14/2025)  0     No current facility-administered medications on file prior to visit.

## 2025-04-22 ENCOUNTER — TELEPHONE (OUTPATIENT)
Dept: PRIMARY CARE | Facility: CLINIC | Age: 65
End: 2025-04-22
Payer: COMMERCIAL

## 2025-04-22 NOTE — TELEPHONE ENCOUNTER
Patient said she has been chills and cold sweats and wasn't sure if this is common for kidney stones and should he go to ED

## 2025-04-23 NOTE — PROGRESS NOTES
I reviewed the progress note and agree with the resident’s findings and plans as written. Case discussed with resident.    Song Mac, PharmD

## 2025-04-24 ENCOUNTER — APPOINTMENT (OUTPATIENT)
Dept: RADIOLOGY | Facility: HOSPITAL | Age: 65
End: 2025-04-24
Payer: COMMERCIAL

## 2025-04-24 ENCOUNTER — HOSPITAL ENCOUNTER (OUTPATIENT)
Facility: HOSPITAL | Age: 65
Setting detail: OUTPATIENT SURGERY
Discharge: HOME | End: 2025-04-24
Attending: STUDENT IN AN ORGANIZED HEALTH CARE EDUCATION/TRAINING PROGRAM | Admitting: STUDENT IN AN ORGANIZED HEALTH CARE EDUCATION/TRAINING PROGRAM
Payer: COMMERCIAL

## 2025-04-24 ENCOUNTER — PHARMACY VISIT (OUTPATIENT)
Dept: PHARMACY | Facility: CLINIC | Age: 65
End: 2025-04-24
Payer: COMMERCIAL

## 2025-04-24 ENCOUNTER — ANESTHESIA (OUTPATIENT)
Dept: OPERATING ROOM | Facility: HOSPITAL | Age: 65
End: 2025-04-24
Payer: COMMERCIAL

## 2025-04-24 VITALS
RESPIRATION RATE: 13 BRPM | SYSTOLIC BLOOD PRESSURE: 145 MMHG | TEMPERATURE: 96.8 F | HEART RATE: 81 BPM | BODY MASS INDEX: 43.52 KG/M2 | DIASTOLIC BLOOD PRESSURE: 69 MMHG | WEIGHT: 304.01 LBS | HEIGHT: 70 IN | OXYGEN SATURATION: 98 %

## 2025-04-24 DIAGNOSIS — N20.0 RIGHT KIDNEY STONE: Primary | ICD-10-CM

## 2025-04-24 LAB — GLUCOSE BLD MANUAL STRIP-MCNC: 101 MG/DL (ref 74–99)

## 2025-04-24 PROCEDURE — 7100000010 HC PHASE TWO TIME - EACH INCREMENTAL 1 MINUTE: Performed by: STUDENT IN AN ORGANIZED HEALTH CARE EDUCATION/TRAINING PROGRAM

## 2025-04-24 PROCEDURE — A52356 PR CYSTO/URETERO W/LITHOTRIPSY  AND INDWELL STENT INSRT: Performed by: ANESTHESIOLOGY

## 2025-04-24 PROCEDURE — 74420 UROGRAPHY RTRGR +-KUB: CPT

## 2025-04-24 PROCEDURE — 7100000009 HC PHASE TWO TIME - INITIAL BASE CHARGE: Performed by: STUDENT IN AN ORGANIZED HEALTH CARE EDUCATION/TRAINING PROGRAM

## 2025-04-24 PROCEDURE — 2500000004 HC RX 250 GENERAL PHARMACY W/ HCPCS (ALT 636 FOR OP/ED): Mod: JZ | Performed by: ANESTHESIOLOGY

## 2025-04-24 PROCEDURE — RXMED WILLOW AMBULATORY MEDICATION CHARGE

## 2025-04-24 PROCEDURE — 7100000001 HC RECOVERY ROOM TIME - INITIAL BASE CHARGE: Performed by: STUDENT IN AN ORGANIZED HEALTH CARE EDUCATION/TRAINING PROGRAM

## 2025-04-24 PROCEDURE — 7100000002 HC RECOVERY ROOM TIME - EACH INCREMENTAL 1 MINUTE: Performed by: STUDENT IN AN ORGANIZED HEALTH CARE EDUCATION/TRAINING PROGRAM

## 2025-04-24 PROCEDURE — 2720000007 HC OR 272 NO HCPCS: Performed by: STUDENT IN AN ORGANIZED HEALTH CARE EDUCATION/TRAINING PROGRAM

## 2025-04-24 PROCEDURE — 3600000003 HC OR TIME - INITIAL BASE CHARGE - PROCEDURE LEVEL THREE: Performed by: STUDENT IN AN ORGANIZED HEALTH CARE EDUCATION/TRAINING PROGRAM

## 2025-04-24 PROCEDURE — C1894 INTRO/SHEATH, NON-LASER: HCPCS | Performed by: STUDENT IN AN ORGANIZED HEALTH CARE EDUCATION/TRAINING PROGRAM

## 2025-04-24 PROCEDURE — 2780000003 HC OR 278 NO HCPCS: Performed by: STUDENT IN AN ORGANIZED HEALTH CARE EDUCATION/TRAINING PROGRAM

## 2025-04-24 PROCEDURE — C1769 GUIDE WIRE: HCPCS | Performed by: STUDENT IN AN ORGANIZED HEALTH CARE EDUCATION/TRAINING PROGRAM

## 2025-04-24 PROCEDURE — C1758 CATHETER, URETERAL: HCPCS | Performed by: STUDENT IN AN ORGANIZED HEALTH CARE EDUCATION/TRAINING PROGRAM

## 2025-04-24 PROCEDURE — 2500000004 HC RX 250 GENERAL PHARMACY W/ HCPCS (ALT 636 FOR OP/ED): Mod: JZ | Performed by: NURSE ANESTHETIST, CERTIFIED REGISTERED

## 2025-04-24 PROCEDURE — 2500000004 HC RX 250 GENERAL PHARMACY W/ HCPCS (ALT 636 FOR OP/ED): Mod: JW | Performed by: STUDENT IN AN ORGANIZED HEALTH CARE EDUCATION/TRAINING PROGRAM

## 2025-04-24 PROCEDURE — 3700000002 HC GENERAL ANESTHESIA TIME - EACH INCREMENTAL 1 MINUTE: Performed by: STUDENT IN AN ORGANIZED HEALTH CARE EDUCATION/TRAINING PROGRAM

## 2025-04-24 PROCEDURE — 52356 CYSTO/URETERO W/LITHOTRIPSY: CPT | Performed by: STUDENT IN AN ORGANIZED HEALTH CARE EDUCATION/TRAINING PROGRAM

## 2025-04-24 PROCEDURE — C2617 STENT, NON-COR, TEM W/O DEL: HCPCS | Performed by: STUDENT IN AN ORGANIZED HEALTH CARE EDUCATION/TRAINING PROGRAM

## 2025-04-24 PROCEDURE — 82947 ASSAY GLUCOSE BLOOD QUANT: CPT

## 2025-04-24 PROCEDURE — 82365 CALCULUS SPECTROSCOPY: CPT | Performed by: STUDENT IN AN ORGANIZED HEALTH CARE EDUCATION/TRAINING PROGRAM

## 2025-04-24 PROCEDURE — 3700000001 HC GENERAL ANESTHESIA TIME - INITIAL BASE CHARGE: Performed by: STUDENT IN AN ORGANIZED HEALTH CARE EDUCATION/TRAINING PROGRAM

## 2025-04-24 PROCEDURE — A52356 PR CYSTO/URETERO W/LITHOTRIPSY  AND INDWELL STENT INSRT: Performed by: NURSE ANESTHETIST, CERTIFIED REGISTERED

## 2025-04-24 PROCEDURE — 74420 UROGRAPHY RTRGR +-KUB: CPT | Performed by: STUDENT IN AN ORGANIZED HEALTH CARE EDUCATION/TRAINING PROGRAM

## 2025-04-24 PROCEDURE — 3600000008 HC OR TIME - EACH INCREMENTAL 1 MINUTE - PROCEDURE LEVEL THREE: Performed by: STUDENT IN AN ORGANIZED HEALTH CARE EDUCATION/TRAINING PROGRAM

## 2025-04-24 DEVICE — IMAJIN™ DOUBLE LOOP URETERAL STENT SILICONE HYDRO-COATED OPEN/OPEN CH FR 06 LENGTH 26 CM WITH STEERABLE PUSHER
Type: IMPLANTABLE DEVICE | Site: URETER | Status: FUNCTIONAL
Brand: PORGES COLOPLAST

## 2025-04-24 RX ORDER — ALBUTEROL SULFATE 0.83 MG/ML
2.5 SOLUTION RESPIRATORY (INHALATION) ONCE AS NEEDED
Status: DISCONTINUED | OUTPATIENT
Start: 2025-04-24 | End: 2025-04-24 | Stop reason: HOSPADM

## 2025-04-24 RX ORDER — CEFAZOLIN 1 G/1
INJECTION, POWDER, FOR SOLUTION INTRAVENOUS AS NEEDED
Status: DISCONTINUED | OUTPATIENT
Start: 2025-04-24 | End: 2025-04-24

## 2025-04-24 RX ORDER — PROPOFOL 10 MG/ML
INJECTION, EMULSION INTRAVENOUS AS NEEDED
Status: DISCONTINUED | OUTPATIENT
Start: 2025-04-24 | End: 2025-04-24

## 2025-04-24 RX ORDER — SUCCINYLCHOLINE CHLORIDE 20 MG/ML
INJECTION INTRAMUSCULAR; INTRAVENOUS AS NEEDED
Status: DISCONTINUED | OUTPATIENT
Start: 2025-04-24 | End: 2025-04-24

## 2025-04-24 RX ORDER — DIPHENHYDRAMINE HYDROCHLORIDE 50 MG/ML
12.5 INJECTION, SOLUTION INTRAMUSCULAR; INTRAVENOUS ONCE AS NEEDED
Status: DISCONTINUED | OUTPATIENT
Start: 2025-04-24 | End: 2025-04-24 | Stop reason: HOSPADM

## 2025-04-24 RX ORDER — GENTAMICIN 40 MG/ML
INJECTION, SOLUTION INTRAMUSCULAR; INTRAVENOUS AS NEEDED
Status: DISCONTINUED | OUTPATIENT
Start: 2025-04-24 | End: 2025-04-24

## 2025-04-24 RX ORDER — ONDANSETRON HYDROCHLORIDE 2 MG/ML
INJECTION, SOLUTION INTRAVENOUS AS NEEDED
Status: DISCONTINUED | OUTPATIENT
Start: 2025-04-24 | End: 2025-04-24

## 2025-04-24 RX ORDER — ONDANSETRON HYDROCHLORIDE 2 MG/ML
4 INJECTION, SOLUTION INTRAVENOUS ONCE AS NEEDED
Status: DISCONTINUED | OUTPATIENT
Start: 2025-04-24 | End: 2025-04-24 | Stop reason: HOSPADM

## 2025-04-24 RX ORDER — PHENAZOPYRIDINE HYDROCHLORIDE 200 MG/1
200 TABLET, FILM COATED ORAL 3 TIMES DAILY PRN
Qty: 6 TABLET | Refills: 0 | Status: SHIPPED | OUTPATIENT
Start: 2025-04-24

## 2025-04-24 RX ORDER — OXYCODONE HYDROCHLORIDE 5 MG/1
5 TABLET ORAL EVERY 4 HOURS PRN
Status: DISCONTINUED | OUTPATIENT
Start: 2025-04-24 | End: 2025-04-24 | Stop reason: HOSPADM

## 2025-04-24 RX ORDER — MEPERIDINE HYDROCHLORIDE 25 MG/ML
12.5 INJECTION INTRAMUSCULAR; INTRAVENOUS; SUBCUTANEOUS EVERY 10 MIN PRN
Status: DISCONTINUED | OUTPATIENT
Start: 2025-04-24 | End: 2025-04-24 | Stop reason: HOSPADM

## 2025-04-24 RX ORDER — FENTANYL CITRATE 50 UG/ML
INJECTION, SOLUTION INTRAMUSCULAR; INTRAVENOUS AS NEEDED
Status: DISCONTINUED | OUTPATIENT
Start: 2025-04-24 | End: 2025-04-24

## 2025-04-24 RX ORDER — CEFAZOLIN SODIUM 2 G/100ML
2 INJECTION, SOLUTION INTRAVENOUS ONCE
Status: DISCONTINUED | OUTPATIENT
Start: 2025-04-24 | End: 2025-04-24 | Stop reason: HOSPADM

## 2025-04-24 RX ORDER — SODIUM CHLORIDE, SODIUM LACTATE, POTASSIUM CHLORIDE, CALCIUM CHLORIDE 600; 310; 30; 20 MG/100ML; MG/100ML; MG/100ML; MG/100ML
100 INJECTION, SOLUTION INTRAVENOUS CONTINUOUS
Status: DISCONTINUED | OUTPATIENT
Start: 2025-04-24 | End: 2025-04-24 | Stop reason: HOSPADM

## 2025-04-24 RX ORDER — DROPERIDOL 2.5 MG/ML
0.62 INJECTION, SOLUTION INTRAMUSCULAR; INTRAVENOUS ONCE AS NEEDED
Status: DISCONTINUED | OUTPATIENT
Start: 2025-04-24 | End: 2025-04-24 | Stop reason: HOSPADM

## 2025-04-24 RX ORDER — SODIUM CHLORIDE, SODIUM LACTATE, POTASSIUM CHLORIDE, CALCIUM CHLORIDE 600; 310; 30; 20 MG/100ML; MG/100ML; MG/100ML; MG/100ML
20 INJECTION, SOLUTION INTRAVENOUS CONTINUOUS
Status: DISCONTINUED | OUTPATIENT
Start: 2025-04-24 | End: 2025-04-24 | Stop reason: HOSPADM

## 2025-04-24 RX ORDER — LIDOCAINE HCL/PF 100 MG/5ML
SYRINGE (ML) INTRAVENOUS AS NEEDED
Status: DISCONTINUED | OUTPATIENT
Start: 2025-04-24 | End: 2025-04-24

## 2025-04-24 RX ORDER — MIDAZOLAM HYDROCHLORIDE 1 MG/ML
INJECTION, SOLUTION INTRAMUSCULAR; INTRAVENOUS AS NEEDED
Status: DISCONTINUED | OUTPATIENT
Start: 2025-04-24 | End: 2025-04-24

## 2025-04-24 RX ORDER — TRAMADOL HYDROCHLORIDE 50 MG/1
50 TABLET ORAL EVERY 6 HOURS PRN
Qty: 12 TABLET | Refills: 0 | Status: SHIPPED | OUTPATIENT
Start: 2025-04-24

## 2025-04-24 RX ORDER — HYDROMORPHONE HYDROCHLORIDE 2 MG/ML
INJECTION, SOLUTION INTRAMUSCULAR; INTRAVENOUS; SUBCUTANEOUS AS NEEDED
Status: DISCONTINUED | OUTPATIENT
Start: 2025-04-24 | End: 2025-04-24

## 2025-04-24 RX ADMIN — SODIUM CHLORIDE, POTASSIUM CHLORIDE, SODIUM LACTATE AND CALCIUM CHLORIDE: 600; 310; 30; 20 INJECTION, SOLUTION INTRAVENOUS at 13:55

## 2025-04-24 RX ADMIN — PROPOFOL 180 MG: 10 INJECTION, EMULSION INTRAVENOUS at 12:39

## 2025-04-24 RX ADMIN — SODIUM CHLORIDE, POTASSIUM CHLORIDE, SODIUM LACTATE AND CALCIUM CHLORIDE: 600; 310; 30; 20 INJECTION, SOLUTION INTRAVENOUS at 12:27

## 2025-04-24 RX ADMIN — SODIUM CHLORIDE, POTASSIUM CHLORIDE, SODIUM LACTATE AND CALCIUM CHLORIDE 20 ML/HR: 600; 310; 30; 20 INJECTION, SOLUTION INTRAVENOUS at 11:39

## 2025-04-24 RX ADMIN — ONDANSETRON 4 MG: 2 INJECTION, SOLUTION INTRAMUSCULAR; INTRAVENOUS at 13:56

## 2025-04-24 RX ADMIN — SUCCINYLCHOLINE CHLORIDE 160 MG: 20 INJECTION, SOLUTION INTRAMUSCULAR; INTRAVENOUS at 12:31

## 2025-04-24 RX ADMIN — HYDROMORPHONE HYDROCHLORIDE 0.4 MG: 2 INJECTION, SOLUTION INTRAMUSCULAR; INTRAVENOUS; SUBCUTANEOUS at 12:57

## 2025-04-24 RX ADMIN — LIDOCAINE HYDROCHLORIDE 100 MG: 20 INJECTION INTRAVENOUS at 12:31

## 2025-04-24 RX ADMIN — DEXAMETHASONE SODIUM PHOSPHATE 4 MG: 4 INJECTION INTRA-ARTICULAR; INTRALESIONAL; INTRAMUSCULAR; INTRAVENOUS; SOFT TISSUE at 12:37

## 2025-04-24 RX ADMIN — FENTANYL CITRATE 50 MCG: 50 INJECTION, SOLUTION INTRAMUSCULAR; INTRAVENOUS at 12:31

## 2025-04-24 RX ADMIN — MIDAZOLAM 2 MG: 1 INJECTION INTRAMUSCULAR; INTRAVENOUS at 12:26

## 2025-04-24 RX ADMIN — PROPOFOL 20 MG: 10 INJECTION, EMULSION INTRAVENOUS at 12:55

## 2025-04-24 RX ADMIN — FENTANYL CITRATE 50 MCG: 50 INJECTION, SOLUTION INTRAMUSCULAR; INTRAVENOUS at 12:49

## 2025-04-24 RX ADMIN — GENTAMICIN SULFATE 160 MG: 40 INJECTION, SOLUTION INTRAMUSCULAR; INTRAVENOUS at 12:37

## 2025-04-24 RX ADMIN — CEFAZOLIN 3 G: 1 INJECTION, POWDER, FOR SOLUTION INTRAMUSCULAR; INTRAVENOUS at 12:31

## 2025-04-24 SDOH — HEALTH STABILITY: MENTAL HEALTH: CURRENT SMOKER: 0

## 2025-04-24 ASSESSMENT — PAIN - FUNCTIONAL ASSESSMENT
PAIN_FUNCTIONAL_ASSESSMENT: 0-10

## 2025-04-24 ASSESSMENT — PAIN SCALES - GENERAL
PAINLEVEL_OUTOF10: 0 - NO PAIN
PAINLEVEL_OUTOF10: 2
PAINLEVEL_OUTOF10: 2

## 2025-04-24 NOTE — ANESTHESIA PROCEDURE NOTES
Airway  Date/Time: 4/24/2025 12:34 PM  Reason: elective    Airway not difficult    Staffing  Performed: CRNA   Authorized by: Dar Peñaloza MD    Performed by: SAMIR Yarbrough-KECIA  Patient location during procedure: OR    Patient Condition  Indications for airway management: anesthesia  Patient position: sniffing  Sedation level: deep     Final Airway Details   Preoxygenated: yes  Final airway type: endotracheal airway  Successful airway: ETT  Cuffed: yes   Successful intubation technique: video laryngoscopy  Adjuncts used in placement: intubating stylet  Endotracheal tube insertion site: oral  Blade type: DNA Direct.  Blade size: #4  ETT size (mm): 7.5  Cormack-Lehane Classification: grade IIb - view of arytenoids or posterior of glottis only  Placement verified by: chest auscultation, capnometry and palpation of cuff   Cuff volume (mL): 10  Measured from: lips  ETT to lips (cm): 23  Number of attempts at approach: 1    Additional Comments  Lips and teeth remain in pre-anesthetic condition s/p intubation.    RSI with cricoid

## 2025-04-24 NOTE — OP NOTE
CYSTOSCOPY AND URETEROSCOPY WITH LASER LITHOTRIPSY AND STENT EXCHANGE, RETROGRADE PYELOGRAM (R) Operative Note     Date: 2025  OR Location: [facility]    Name: Hugo Gauthier, : 1960, Age: 64 y.o., MRN: [id number], Sex: male    Diagnosis  Pre-op Diagnosis      * Right kidney stone [N20.0] Post-op Diagnosis     * Right kidney stone [N20.0]     Procedures  CYSTOSCOPY AND URETEROSCOPY WITH LASER LITHOTRIPSY AND STENT EXCHANGE, RETROGRADE PYELOGRAM  05957 - [place] CYSTO/URETERO W/LITHOTRIPSY &INDWELL STENT INSRT    CHG UROGRAPHY RETROGRADE WITH/WO KUB [65788]  Surgeons      * Maged Taylor - Primary    Resident/Fellow/Other Assistant:  Surgeons and Role:  * No surgeons found with a matching role *    Staff:   Relief Circulator: Sheree  Scrub Person: Gayla  Circulator: Audra  Scrub Person: Lsia Peters Scrub: Jose Rafael    Anesthesia Staff: Anesthesiologist: Dar Peñaloza MD  CRNA: SAMIR Yarbrough-KECIA    Procedure Summary  Anesthesia: General  ASA: III  Estimated Blood Loss: 2 mL  Intra-op Medications:   Administrations occurring from 1205 to 1335 on 25:   Medication Name Total Dose   lactated Ringer's infusion Cannot be calculated   ceFAZolin (Ancef) vial 1 g 3 g   dexAMETHasone (Decadron) injection 4 mg/mL 4 mg   fentaNYL (Sublimaze) injection 50 mcg/mL 100 mcg   gentamicin (Garamycin) injection 40 mg/mL 160 mg   HYDROmorphone (Dilaudid) injection 2 mg/mL 0.4 mg   lidocaine (cardiac) injection 2% prefilled syringe 100 mg   midazolam (Versed) injection 1 mg/mL 2 mg   propofol (Diprivan) injection 10 mg/mL 200 mg   succinylcholine 20 mg/mL VIAL 160 mg              Anesthesia Record               Intraprocedure I/O Totals          Intake    lactated Ringer's infusion 1100.00 mL    Total Intake 1100 mL          Specimen:   ID Type Source Tests Collected by Time   A : RIGHT KIDNEY STONES Calculus Urine, Clean Catch CALCULI (STONE) ANALYSIS Maged Taylor MD 2025 1330                  Drains and/or Catheters: * None in log *    Tourniquet Times:         Implants:  Implants       Type Name Action Serial No.      Implant STENT KIT, IMAJIN HYDRO, 6FR X 26CM, POSITIONER, NO GUIDEWIRE - [id number] Implanted               Findings: several large stones in the right kidney, completely fragmented    Indications: Hugo Gauthier is an 64 y.o. male who is having surgery for Right kidney stone [N20.0]. Prior pyelonephritis secondary to obstructive stone, had a stent inserted and now presenting for stone management.    The patient was seen in the preoperative area. The risks, benefits, complications, treatment options, non-operative alternatives, expected recovery and outcomes were discussed with the patient. The possibilities of reaction to medication, pulmonary aspiration, injury to surrounding structures, bleeding, recurrent infection, the need for additional procedures, failure to diagnose a condition, and creating a complication requiring transfusion or operation were discussed with the patient. The patient concurred with the proposed plan, giving informed consent.  The site of surgery was properly noted/marked if necessary per policy. The patient has been actively warmed in preoperative area. Preoperative antibiotics have been ordered and given within 1 hours of incision. Venous thrombosis prophylaxis have been ordered including bilateral sequential compression devices    Procedure Details: Patient was consented and antibiotics were started on call to OR.  In the operating room, under general anesthesia, with the patient in dorsolithotomy position, genitalia was prepped and draped in the usual manner.  #22 cystoscope was inserted down the urethra into the bladder, and cystoscopy revealed no stones or tumors. The ureteral orifices were identified in the normal orthotopic position with a stent arising from the right ureteral orifice.  The stent grasper was used to pull the stent and a guidewire was  inserted through the stent into the right ureter up to the level of the renal pelvis.  The stent was removed, a double-lumen catheter was inserted over the wire, then a Super Stiff catheter was advanced through the double-lumen catheter up to the renal pelvis.  Double-lumen catheter was removed, then a 12-14 x 46cm rigid access sheath was advanced over the Super Stiff wire.  The inner sheath was removed along with the Super Stiff wire.  The flexible ureteroscope was inserted through and pyeloscopy was performed revealing multiple stones, each ranging between 1 and 1.5 cm in the different calyces.  Using the 365 µm laser fiber, the stone was fragmented into small pieces.  We continued working on the stone until there were no large fragments, and some of the stone fragments were extracted using the basket.    Contrast was injected through the ureteroscope, showing mild residual hydronephrosis, and no filling defects.    Flexible ureteroscope and the ureteral access sheath were extracted under vision.  The cystoscope was reinserted over the wire.  A XXX double-J stent was inserted over the wire and position was adjusted by the pusher.  The proximal curl was seen by fluoroscopy to be in the topography of the kidney, while the distal curl was visualized directly in the bladder.    The bladder was emptied.  This concluded the procedure.  Patient tolerated the procedure well, was awakened and transferred to PACU in stable condition    Evidence of Infection: No   Complications:  None; patient tolerated the procedure well.    Disposition: PACU - hemodynamically stable.  Condition: stable       Attending Attestation: I performed the procedure.    Maged Taylor  Phone Number: [phone]

## 2025-04-24 NOTE — ANESTHESIA POSTPROCEDURE EVALUATION
Patient: Hugo Gauthier    Procedure Summary       Date: 04/24/25 Room / Location: [facility] OR 01 / [facility]    Anesthesia Start: 1227 Anesthesia Stop: 1417    Procedure: CYSTOSCOPY AND URETEROSCOPY WITH LASER LITHOTRIPSY AND STENT EXCHANGE, RETROGRADE PYELOGRAM (Right) Diagnosis:       Right kidney stone      (Right kidney stone [N20.0])    Surgeons: Maged Taylor MD Responsible Provider: Dar Peñaloza MD    Anesthesia Type: general ASA Status: 3            Anesthesia Type: general    Vitals Value Taken Time   BP See vitals 04/24/25 14:18   Temp  04/24/25 14:18   Pulse  04/24/25 14:18   Resp  04/24/25 14:18   SpO2  04/24/25 14:18       Anesthesia Post Evaluation    Patient location during evaluation: PACU  Patient participation: complete - patient participated  Level of consciousness: awake  Pain management: adequate  Multimodal analgesia pain management approach  Airway patency: patent  Two or more strategies used to mitigate risk of obstructive sleep apnea  Cardiovascular status: acceptable  Respiratory status: acceptable  Hydration status: acceptable  Postoperative Nausea and Vomiting: none        There were no known notable events for this encounter.

## 2025-04-24 NOTE — DISCHARGE INSTRUCTIONS
DEPARTMENT OF UROLOGY  DISCHARGE INSTRUCTIONS ES  Outpatient Surgery    C O N F I D E N T I A L   I N F O R M A T I O N    Call [phone] during regular daytime business hours (8:00 am - 5:00 pm) and after 5:00 pm ask for the Urology resident with any questions or concerns.    If it is a life-threatening situation, proceed to the nearest emergency department.        Follow-up appointment:  Follow up with Dr. Taylor as scheduled or call office for appointment  Thank you for the opportunity to care for you today.  Your health and healing are very important to us.  We hope we made you feel as comfortable as possible and are committed to your recovery and continued well-being.      The following is a brief overview of your procedure today. Some of the information contained on this summary may be confidential.  This information should be kept in your records and should be shared with your regular doctor.    Physicians:   Dr. Taylor    Procedure performed: Extracorporeal Shockwave Lithotripsy    What to Expect During your Recovery and Home Care  Anesthesia Side Effects   You received anesthesia today.  You may feel sleepy, tired, or have a sore throat.   You may also feel drowsiness, dizziness, or inability to think clearly.  For your safety, do not drive, drink alcoholic beverages, take any unprescribed medication or make any important decisions for 24 hours.  A responsible adult should be with you for 24 hours.        Activity and Recovery    Rest and take it easy today, you may return to your normal activities tomorrow. Expect to be sore for 1-2 weeks.     Pain Control  Unfortunately, you may experience pain after your procedure.  Adequate management can include alternative measures to help ease your pain and can include heating pad to your lower back, over the counter Tylenol. Do not take more than 4,000 mg of Tylenol in 24 hours. You may have also been prescribed tramadol and pyridium for pain control if you  had a stent placed today. If prescribed pyridium this will turn your urine bright orange.     Nausea/Vomiting   Clear liquids are best tolerated at first. Start slow, advance your diet as tolerated to normal foods. Avoid spicy, greasy, heavy foods at first. Also, you may feel nauseous or like you need to vomit if you take any type of medication on an empty stomach.  Call your physician if you are unable to eat or drink and have persistent vomiting.          Treatment/wound care:   You may observe redness over the area of your skin that the shockwave machine was used. This will diminish over the next 12-24 hours.   It is okay to shower 24 hours after time of surgery.     Signs of Infection:  Fever of 100.4 F(38C) or higher, chills, blood in your urine and pain with passing urine    Additional Instructions:   Pieces of your kidney stone may pass in the urine for a few days and may cause some mild pain. Drink 8-10 glasses of water each day. This will help flush out the broken kidney stones. You may be asked to strain our urine using a filter, to collect stone pieces that could be used for testing.    You may have had a stent placed today from your kidney down into your bladder. This helps keep the urinary tract open and prevents blockages of urine flow. The stent can be irritating and can cause some frequency, urgency and blood in your urine when you go to the bathroom. It is important to drink plenty of water and take medications as prescribed. You may be sent home with pyridium which turns your urine bright orange. Applying a heating pad to your back will also help with this kind of pain.

## 2025-04-24 NOTE — ANESTHESIA PREPROCEDURE EVALUATION
Patient: Hugo Gauthier    Procedure Information       Date/Time: 04/24/25 1205    Procedure: LITHOTRIPSY, WITH CYSTOSCOPY (Right)    Location: [facility] 08 / [facility]    Surgeons: Maged Taylor MD          Vitals:    04/24/25 1104   BP: 138/67   Pulse: 88   Resp: 18   Temp: 36.8 °C (98.2 °F)   SpO2: 97%       Surgical History[1]  Medical History[2]  Current Medications[3]  Prior to Admission medications    Medication Sig Start Date End Date Taking? Authorizing Provider   dilTIAZem CD (Cardizem CD) 120 mg 24 hr capsule TAKE 1 CAPSULE(120 MG) BY MOUTH EVERY DAY 7/19/24   SHERLEY Justice   lisinopril 20 mg tablet TAKE 1 TABLET(20 MG) BY MOUTH DAILY 4/14/25   Edilma Luna MD   magnesium oxide (Mag-Ox) 400 mg tablet Take 1 tablet (400 mg) by mouth once daily. 10/4/19   Historical Provider, MD tiradonosides-docusate sodium (Dania-Colace) 8.6-50 mg tablet Take 2 tablets by mouth 2 times a day. 3/31/25   Louise Varma DO   spironolactone (Aldactone) 25 mg tablet TAKE 1 TABLET BY MOUTH ONCE DAILY 1/15/25   SHERLEY Justice   tamsulosin (Flomax) 0.4 mg 24 hr capsule Take 1 capsule (0.4 mg) by mouth once daily. 3/31/25   Louise Varma DO   warfarin (Coumadin) 1 mg tablet TAKE 1 TABLET BY MOUTH EVERY DAY  Patient taking differently: Take 0.5 tablets (0.5 mg) by mouth. 12/7/23   Edilma Luna MD   warfarin (Coumadin) 4 mg tablet TAKE 1 TABLET EVERY DAY 3/17/25   Edilma Luna MD   acetaminophen (Tylenol) 500 mg tablet Take 2 tablets (1,000 mg) by mouth every 8 hours.  Patient not taking: Reported on 4/14/2025 3/31/25 4/22/25  Louise Varma DO   alpha tocopherol (Vitamin E) 268 mg (400 unit) capsule Take by mouth.  Patient not taking: Reported on 4/14/2025 4/22/25  Historical Provider, MD   ascorbic acid (Vitamin C) 1,000 mg tablet Take 1 tablet (1,000 mg) by mouth once daily.  Patient not taking: Reported on 4/14/2025 10/4/19 4/22/25  Historical Provider, MD   cholecalciferol (Vitamin  D-3) 50 MCG (2000 UT) tablet Take 1 tablet (50 mcg) by mouth once daily.  Patient not taking: Reported on 4/14/2025 10/4/19 4/22/25  Historical Provider, MD   folic acid (Folvite) 800 mcg tablet Take 1 tablet (800 mcg) by mouth once daily.  Patient not taking: Reported on 4/14/2025 10/4/19 4/22/25  Historical Provider, MD   iodine, bulk, crystals USE AS DIRECTED.  Patient not taking: Reported on 4/14/2025 10/4/19 4/22/25  Historical Provider, MD   methocarbamol (Robaxin) 500 mg tablet Take 2 tablets (1,000 mg) by mouth every 8 hours.  Patient not taking: Reported on 4/14/2025 3/31/25 4/22/25  Louise Varma DO   oxyCODONE (Roxicodone) 5 mg immediate release tablet Take 1 tablet (5 mg) by mouth every 12 hours if needed for severe pain (7 - 10).  Patient not taking: Reported on 4/14/2025 3/31/25 4/22/25  Louise Varma DO   SantyL 250 unit/gram ointment  6/21/24 4/22/25  Historical Provider, MD   selenium 200 mcg tablet Take 1 tablet (200,000 mcg) by mouth once daily.  Patient not taking: Reported on 4/14/2025 10/4/19 4/22/25  Historical Provider, MD   zinc gluconate 100 mg tablet Take 1 tablet (100 mg) by mouth once daily.  Patient not taking: Reported on 4/14/2025 5/22/23 4/22/25  Edilma Luna MD     RX Allergies[4]  Social History     Tobacco Use   • Smoking status: Former     Average packs/day: 1 pack/day for 20.0 years (20.0 ttl pk-yrs)     Types: Cigarettes     Start date: 1996     Passive exposure: Never   • Smokeless tobacco: Never   Substance Use Topics   • Alcohol use: Yes     Comment: rare         Chemistry    Lab Results   Component Value Date/Time     03/31/2025 0629     01/27/2025 1259    K 4.3 03/31/2025 0629    K 4.6 01/27/2025 1259     03/31/2025 0629     01/27/2025 1259    CO2 25 03/31/2025 0629    CO2 26 01/27/2025 1259    BUN 19 03/31/2025 0629    BUN 20 01/27/2025 1259    CREATININE 1.09 03/31/2025 0629    CREATININE 0.86 01/27/2025 1259    Lab Results   Component Value  Date/Time    CALCIUM 8.2 (L) 03/31/2025 0629    CALCIUM 9.9 01/27/2025 1259    ALKPHOS 65 03/28/2025 2148    AST 22 03/28/2025 2148    ALT 24 03/28/2025 2148    BILITOT 1.1 03/28/2025 2148          Lab Results   Component Value Date/Time    WBC 8.1 03/31/2025 0629    HGB 10.6 (L) 03/31/2025 0629    HCT 35.7 (L) 03/31/2025 0629     03/31/2025 0629     Lab Results   Component Value Date/Time    PROTIME 13.1 (H) 03/31/2025 0629    PROTIME 20.4 (A) 02/10/2025 0946    INR 1.2 (H) 03/31/2025 0629    INR 1.7 (A) 02/10/2025 0946     Encounter Date: 03/28/25   ECG 12 lead   Result Value    Ventricular Rate 94    Atrial Rate 93    MS Interval 164    QRS Duration 97    QT Interval 320    QTC Calculation(Bazett) 401    P Axis 61    R Axis 28    T Axis 26    QRS Count 15    Q Onset 251    T Offset 411    QTC Fredericia 371    Narrative    Sinus rhythm  Minimal ST elevation, anterior leads    See ED provider note for full interpretation and clinical correlation  Confirmed by Bisi Coley ([id number]) on 4/2/2025 1:28:44 PM     No results found for this or any previous visit from the past 1095 days.      Relevant Problems   Cardiac   (+) Atrial fibrillation (Multi)   (+) Coronary artery disease involving native coronary artery of native heart   (+) HTN (hypertension)   (+) Hypertension   (+) Other chest pain      Pulmonary   (+) Dyspnea on exertion   (+) RADHA (obstructive sleep apnea)   (+) SOBOE (shortness of breath on exertion)      Neuro   (+) Anxiety   (+) Test anxiety      /Renal   (+) Acute urinary tract infection   (+) Pyelonephritis   (+) Right kidney stone      Endocrine   (+) Class 3 severe obesity due to excess calories with serious comorbidity and body mass index (BMI) of 45.0 to 49.9 in adult   (+) Morbid obesity (Multi)      Hematology   (+) Anemia   (+) Anemia due to blood loss   (+) Iron deficiency anemia      Musculoskeletal   (+) Chronic bilateral low back pain without sciatica   (+) Chronic low back  pain   (+) Lumbar spinal stenosis      ID   (+) Acute urinary tract infection   (+) Candidiasis of intestine   (+) Local infection of wound   (+) Onychomycosis of toenail   (+) Pubic lice   (+) Pyelonephritis   (+) Tinea cruris       Clinical information reviewed:                   NPO Detail:  No data recorded     Physical Exam    Airway  Mallampati: III     Cardiovascular - normal exam   Dental    Pulmonary    Abdominal          Anesthesia Plan    History of general anesthesia?: yes  History of complications of general anesthesia?: no    ASA 3     general     The patient is not a current smoker.  Patient was not previously instructed to abstain from smoking on day of procedure.  Patient did not smoke on day of procedure.  Education provided regarding risk of obstructive sleep apnea.  intravenous induction   Anesthetic plan and risks discussed with patient.    Plan discussed with CRNA.           [1]  Past Surgical History:  Procedure Laterality Date   • CARDIAC CATHETERIZATION N/A 10/13/2023    Procedure: Left Heart Cath;  Surgeon: Jude Lamas MD;  Location: [facility] Cardiac Cath Lab;  Service: Cardiovascular;  Laterality: N/A;  PACE ORDERING / MORALES DOING   • CYSTOSCOPY      with stent   • ILIAC VEIN ANGIOPLASTY / STENTING Left    [2]  Past Medical History:  Diagnosis Date   • (HFpEF) heart failure with preserved ejection fraction    • Arrhythmia    • Chronic venous stasis dermatitis    • Clotting disorder (Multi)    • Coronary artery disease involving native coronary artery of native heart 10/20/2023   • Disease of thyroid gland    • DVT (deep venous thrombosis) (Multi)     LLE   • Hydronephrosis    • Hypertension    • Hypogonadism in male    • Lymphedema    • May-Thurner syndrome    • Morbid obesity (Multi)    • RADHA (obstructive sleep apnea)     does not wear CPAP   • Paroxysmal A-fib (Multi)     pt denies   • Personal history of diseases of the blood and blood-forming organs and certain disorders involving the  immune mechanism 10/19/2021    History of iron deficiency anemia   • Personal history of diseases of the blood and blood-forming organs and certain disorders involving the immune mechanism 10/19/2021    History of anemia   • PVD (peripheral vascular disease) (CMS-HCC)    • Pyelonephritis    • Right kidney stone    • Type 2 diabetes mellitus    • Use of cane as ambulatory aid    • Venous insufficiency    • Wears glasses    • Wound of left leg    [3]    Current Facility-Administered Medications:   •  ceFAZolin (Ancef) 2 g in dextrose (iso)  mL, 2 g, intravenous, Once, Maged Taylor MD  •  lactated Ringer's infusion, 20 mL/hr, intravenous, Continuous, Dar Peñaloza MD  [4]  No Known Allergies

## 2025-04-28 ENCOUNTER — PATIENT OUTREACH (OUTPATIENT)
Dept: PRIMARY CARE | Facility: CLINIC | Age: 65
End: 2025-04-28
Payer: COMMERCIAL

## 2025-04-29 ENCOUNTER — TELEPHONE (OUTPATIENT)
Dept: UROLOGY | Facility: CLINIC | Age: 65
End: 2025-04-29
Payer: COMMERCIAL

## 2025-04-29 LAB
APPEARANCE STONE: NORMAL
COMPN STONE: NORMAL
SPECIMEN WT: 91 MG

## 2025-04-29 NOTE — LETTER
Date: 2025  RE:  Hugo Gauthier  :  1960      To Whom It May Concern:    Our patient, Hugo, has been under our care and now may return back to work without restrictions.    Their return to work date is:  25 with no restrictions     If you have questions concerning this patient's immediate care, please feel free to contact our office at [phone].    Sincerely,      Gabriella Thomas LPN  Supervising Provider: Dr. Maged Taylor

## 2025-04-30 ENCOUNTER — TELEPHONE (OUTPATIENT)
Dept: PRIMARY CARE | Facility: CLINIC | Age: 65
End: 2025-04-30
Payer: COMMERCIAL

## 2025-04-30 NOTE — TELEPHONE ENCOUNTER
Patient called to tell us [place] papers will be faxed over to us for for dates of 5/9/2025 to 3/24/2026 for intermittent leave.

## 2025-05-02 DIAGNOSIS — I48.0 PAROXYSMAL ATRIAL FIBRILLATION (MULTI): ICD-10-CM

## 2025-05-05 ENCOUNTER — OFFICE VISIT (OUTPATIENT)
Dept: WOUND CARE | Facility: CLINIC | Age: 65
End: 2025-05-05
Payer: COMMERCIAL

## 2025-05-05 ENCOUNTER — APPOINTMENT (OUTPATIENT)
Dept: WOUND CARE | Facility: CLINIC | Age: 65
End: 2025-05-05
Payer: COMMERCIAL

## 2025-05-05 PROCEDURE — 11045 DBRDMT SUBQ TISS EACH ADDL: CPT

## 2025-05-05 PROCEDURE — 11042 DBRDMT SUBQ TIS 1ST 20SQCM/<: CPT

## 2025-05-21 NOTE — PROGRESS NOTES
Patient ID: Hugo Gauthier is a 64 y.o. year old male patient       PROCEDURE NOTE:     PREOPERATIVE DIAGNOSIS:  Right ureteral stone  Underwent a cystoscopy and ureteroscopy with lithotripsy,   stent exchange and retrograde pyelogram.     POSTOPERATIVE DIAGNOSIS:  Same     OPERATION:  Flexible Cystourethroscopy  Removal of right double J stent     SURGEON:  Maged Taylor MD     ANESTHESIA:  2%  lidocaine jelly     COMPLICATIONS:  None     EBL: Minimal     SPECIMEN:  Voided urine was not collected and submitted for culture.     DISPOSITION:  The patient was discharged home after the procedure, per routine.     INDICATIONS: :  Patient with a history of right ureteral stone who underwent right ureteroscopy and laser stone fragmentation with stent placement,  who presents today for Cystoscopy and stent removal.      The indications, risks and benefits of this procedure were discussed with the patient, consent was obtained prior to the procedure, and to the best of my judgement the patient seemed to understand and agree to the procedure.     PROCEDURE:  The patient  was brought into the procedure suite and informed consent was reviewed and confirmed. Vital signs were obtained prior to the procedure: There were no vitals taken for this visit.  The patient was escorted onto the stretcher, placed supine, prepped with betadine and draped in the usual standard surgical fashion.  Intraurethral 2% viscous lidocaine jelly was used for local analgesia.  A 16 Kinyarwanda flexible cystourethroscope was inserted into the urethra.   The urethra was normal.  Upon entering the bladder the entire bladder was surveyed in a 360 degree fashion.  The left and right ureteral orifices were in normal orthotopic position with the stent protruding out of the right ureteral orifice.   Using the stent grasper, the right double J stent was grasped and pulled out of the urethra.   The patient tolerated the procedure well.  Vitals were stable after  the procedure.  The patient was able to void and was discharged home.  Verbal and written Post procedure instructions were reviewed with the patient.     IMPRESSION:  Right ureteral stent removed smoothly     PLAN:  Encourage hydration  Follow up as needed        Calculi Composition    Comment: Calculi composed primarily of:  60% magnesium ammonium phosphate (struvite), and  40% calcium phosphate (hydroxy- and carbonate- apatite).

## 2025-05-23 ENCOUNTER — APPOINTMENT (OUTPATIENT)
Dept: UROLOGY | Facility: CLINIC | Age: 65
End: 2025-05-23
Payer: COMMERCIAL

## 2025-05-23 DIAGNOSIS — N40.1 BENIGN PROSTATIC HYPERPLASIA WITH LOWER URINARY TRACT SYMPTOMS, SYMPTOM DETAILS UNSPECIFIED: ICD-10-CM

## 2025-05-23 DIAGNOSIS — Z79.2 PROPHYLACTIC ANTIBIOTIC: ICD-10-CM

## 2025-05-23 LAB
POC APPEARANCE, URINE: CLEAR
POC BILIRUBIN, URINE: NEGATIVE
POC BLOOD, URINE: ABNORMAL
POC COLOR, URINE: YELLOW
POC GLUCOSE, URINE: NEGATIVE MG/DL
POC KETONES, URINE: NEGATIVE MG/DL
POC LEUKOCYTES, URINE: ABNORMAL
POC NITRITE,URINE: POSITIVE
POC PH, URINE: 6.5 PH
POC PROTEIN, URINE: NEGATIVE MG/DL
POC SPECIFIC GRAVITY, URINE: 1.02
POC UROBILINOGEN, URINE: 0.2 EU/DL

## 2025-05-23 PROCEDURE — 52310 CYSTOSCOPY AND TREATMENT: CPT | Performed by: STUDENT IN AN ORGANIZED HEALTH CARE EDUCATION/TRAINING PROGRAM

## 2025-05-23 RX ORDER — LEVOFLOXACIN 500 MG/1
500 TABLET, FILM COATED ORAL ONCE
Status: COMPLETED | OUTPATIENT
Start: 2025-05-23 | End: 2025-05-23

## 2025-05-23 RX ADMIN — LEVOFLOXACIN 500 MG: 500 TABLET, FILM COATED ORAL at 11:52

## 2025-05-23 ASSESSMENT — PAIN SCALES - GENERAL: PAINLEVEL_OUTOF10: 4

## 2025-05-23 NOTE — LETTER
May 23, 2025     Patient: Hugo Gauthier   YOB: 1960   Date of Visit: 5/23/2025       To Whom It May Concern:    It is my medical opinion that Hugo Gauthier may return to work on 5/27/25 with no restrictions.    If you have any questions or concerns, please don't hesitate to call.         Sincerely,        Maged Taylor MD

## 2025-05-25 LAB — BACTERIA UR CULT: NORMAL

## 2025-05-27 LAB — BACTERIA UR CULT: ABNORMAL

## 2025-05-30 DIAGNOSIS — I48.0 PAROXYSMAL ATRIAL FIBRILLATION (MULTI): ICD-10-CM

## 2025-06-02 ENCOUNTER — HOSPITAL ENCOUNTER (OUTPATIENT)
Dept: VASCULAR MEDICINE | Facility: HOSPITAL | Age: 65
Discharge: HOME | End: 2025-06-02
Payer: COMMERCIAL

## 2025-06-02 ENCOUNTER — OFFICE VISIT (OUTPATIENT)
Dept: WOUND CARE | Facility: CLINIC | Age: 65
End: 2025-06-02
Payer: COMMERCIAL

## 2025-06-02 ENCOUNTER — APPOINTMENT (OUTPATIENT)
Dept: WOUND CARE | Facility: CLINIC | Age: 65
End: 2025-06-02
Payer: COMMERCIAL

## 2025-06-02 DIAGNOSIS — R60.0 LOCALIZED EDEMA: ICD-10-CM

## 2025-06-02 DIAGNOSIS — I87.2 VENOUS INSUFFICIENCY: ICD-10-CM

## 2025-06-02 DIAGNOSIS — I87.1 COMPRESSION OF VEIN: ICD-10-CM

## 2025-06-02 DIAGNOSIS — I73.9 PVD (PERIPHERAL VASCULAR DISEASE): ICD-10-CM

## 2025-06-02 PROCEDURE — 93922 UPR/L XTREMITY ART 2 LEVELS: CPT | Performed by: INTERNAL MEDICINE

## 2025-06-02 PROCEDURE — 93922 UPR/L XTREMITY ART 2 LEVELS: CPT

## 2025-06-02 PROCEDURE — 93978 VASCULAR STUDY: CPT | Performed by: INTERNAL MEDICINE

## 2025-06-02 PROCEDURE — 93971 EXTREMITY STUDY: CPT

## 2025-06-02 PROCEDURE — 11042 DBRDMT SUBQ TIS 1ST 20SQCM/<: CPT

## 2025-06-02 PROCEDURE — 93978 VASCULAR STUDY: CPT

## 2025-06-02 PROCEDURE — 93971 EXTREMITY STUDY: CPT | Performed by: INTERNAL MEDICINE

## 2025-06-05 ENCOUNTER — TELEPHONE (OUTPATIENT)
Dept: PRIMARY CARE | Facility: CLINIC | Age: 65
End: 2025-06-05
Payer: COMMERCIAL

## 2025-06-05 DIAGNOSIS — N39.0 URINARY TRACT INFECTION WITHOUT HEMATURIA, SITE UNSPECIFIED: ICD-10-CM

## 2025-06-05 DIAGNOSIS — B37.2 CUTANEOUS CANDIDIASIS: Primary | ICD-10-CM

## 2025-06-05 RX ORDER — FLUCONAZOLE 100 MG/1
100 TABLET ORAL DAILY
Qty: 10 TABLET | Refills: 0 | Status: SHIPPED | OUTPATIENT
Start: 2025-06-05 | End: 2025-06-15

## 2025-06-05 RX ORDER — NYSTATIN 100000 U/G
CREAM TOPICAL 2 TIMES DAILY
Qty: 60 G | Refills: 0 | Status: SHIPPED | OUTPATIENT
Start: 2025-06-05 | End: 2025-06-12

## 2025-06-05 RX ORDER — CIPROFLOXACIN 500 MG/1
500 TABLET, FILM COATED ORAL 2 TIMES DAILY
Qty: 20 TABLET | Refills: 0 | Status: SHIPPED | OUTPATIENT
Start: 2025-06-05 | End: 2025-06-15

## 2025-06-05 NOTE — TELEPHONE ENCOUNTER
Patient is complaining of itching around thighs and testicles since he has been having to wear diapers for kidney stones and thinks he has a yeast infection.  Wanted rx. Please advise  [phone] (cell) or [phone] (work) till 4:30

## 2025-06-10 ENCOUNTER — APPOINTMENT (OUTPATIENT)
Dept: VASCULAR SURGERY | Facility: HOSPITAL | Age: 65
End: 2025-06-10
Payer: COMMERCIAL

## 2025-06-11 DIAGNOSIS — Z12.12 SCREENING FOR COLORECTAL CANCER: ICD-10-CM

## 2025-06-11 DIAGNOSIS — Z12.11 SCREENING FOR COLORECTAL CANCER: ICD-10-CM

## 2025-06-27 ENCOUNTER — PATIENT OUTREACH (OUTPATIENT)
Dept: PRIMARY CARE | Facility: CLINIC | Age: 65
End: 2025-06-27
Payer: COMMERCIAL

## 2025-06-27 DIAGNOSIS — I48.0 PAROXYSMAL ATRIAL FIBRILLATION (MULTI): ICD-10-CM

## 2025-06-30 ENCOUNTER — OFFICE VISIT (OUTPATIENT)
Dept: WOUND CARE | Facility: CLINIC | Age: 65
End: 2025-06-30
Payer: COMMERCIAL

## 2025-06-30 PROCEDURE — 11042 DBRDMT SUBQ TIS 1ST 20SQCM/<: CPT

## 2025-06-30 PROCEDURE — 11045 DBRDMT SUBQ TISS EACH ADDL: CPT

## 2025-07-03 ENCOUNTER — APPOINTMENT (OUTPATIENT)
Dept: VASCULAR SURGERY | Facility: HOSPITAL | Age: 65
End: 2025-07-03
Payer: COMMERCIAL

## 2025-07-05 PROBLEM — I73.9 PERIPHERAL ARTERIAL DISEASE: Status: ACTIVE | Noted: 2025-07-05

## 2025-07-05 NOTE — PROGRESS NOTES
[facility]    Patient Name: Hugo Gauthier  Patient : 1960    Scribe Attestation  By signing my name below, Maine GOMEZ, Scribmaria l attest that this documentation has been prepared under the direction and in the presence of Gorge Medeiros DO.    Physician Attestation  Gogre GOMEZ DO, personally performed the services described in the documentation as scribed by Maine Griggs in my presence, and confirm it is both accurate and complete.    Reason for visit:  This is a 64-year-old male here for follow-up regarding HFpEF, coronary artery disease as seen on cardiac catheterization done in 2023, sleep apnea/chronic fatigue, hypertension, chronic venous stasis dermatitis/peripheral arterial disease, and morbid obesity.     HPI:  This is a 64-year-old male here for follow-up regarding HFpEF, coronary artery disease as seen on cardiac catheterization done in 2023, sleep apnea/chronic fatigue, hypertension, chronic venous stasis dermatitis/peripheral arterial disease, and morbid obesity.  The patient was last evaluated by me in 2025.  At that visit I referred him to Vascular Surgery, ordered blood work including BMP/BNP/magnesium, ordered additional blood work including CMP/lipid/magnesium/BMP to be drawn in 6 months, and asked the patient to follow-up in 6 months and sooner if necessary.  Patient subsequently had a hospitalization in 2025 secondary to OLIMPIA and pyelonephritis.  Patient underwent cystoscopy with ureteral stent placement in 2025.  Patient underwent repeat cystoscopy with laser lithotripsy and 2025.  BMP done in 2025 showed normal serum sodium and potassium with a serum creatinine of 1.09, serum magnesium was 1.92, CRP was 11.64, CBC showed a hemoglobin of 10.6.  Lower extremity venous ultrasound done in 2025 showed right lower extremity venous insufficiency.  CHRISTIANO done in 2025 showed no evidence  of arterial occlusive disease bilaterally. ECG done today showed ***            Assessment/Plan:   1. HFpEF  The patient has a history of HFpEF.  Echocardiogram done in September 2023 showed normal left ventricular systolic function with an ejection fraction of 60%, grade 1 diastolic dysfunction, and difficult imaging with poorly visualized anatomic structures.    He does not appear significantly volume overloaded on exam today.  He should continue his current cardiac medications.  Recent lab works as noted in the HPI.  Echocardiogram and lab works as noted below will be done in 6 months prior to his next visit.   I discussed with him the importance of following a low-sodium heart healthy diet as well as weight loss.   Follow up in 6 months and sooner if necessary.      2. Coronary artery disease  The patient had a history of minimal nonobstructive coronary artery disease as seen on cardiac catheterization done in October 2023.  ECG done today showed ***  He denies anginal chest discomfort or shortness of breath on exertion.  Blood pressure appears controlled on exam today.  He should continue his current antihypertensive medications and antiplatelet therapy.  Echocardiogram done in September 2023 showed normal left ventricular systolic function with an ejection fraction of 60%, grade 1 diastolic dysfunction, and difficult imaging with poorly visualized anatomic structures.    Recent lab works as noted in the HPI.  Lipid panel done 7/29/2024 showed an LDL cholesterol 54 and triglycerides of 236 not currently on any lipid-lowering medical therapy. ***  Echocardiogram and lab works as noted below will be done in 6 months prior to his next visit.   Please see lifestyle recommendations below.  Follow up in 6 months and sooner if necessary.      3. Sleep apnea/Chronic fatigue  The patient has a history of obstructive sleep apnea.   The previously reported chronic fatigue had remained stable since the last visit.   He  should continue to follow with Neurology.     4. Hypertension  The patient has a history of hypertension which appears controlled on exam today.  He should continue his current antihypertensive medications and monitor his blood pressure at home.      5. Chronic venous stasis dermatitis/Peripheral arterial disease  The patient has a history of chronic venous stasis dermatitis.  He underwent a bilateral lower venogram with left iliac vein stent on 11/20/2023.   Warfarin dosing and INR monitoring is being managed by his PCP.  Lower extremity venous ultrasound done in June 2025 showed right lower extremity venous insufficiency.    CHRISTIANO done in June 2025 showed no evidence of arterial occlusive disease bilaterally.   He should continue to follow with Vascular Surgery.     6. Morbid obesity  Please see lifestyle recommendations below.            Orders:   CMP/lipid/magnesium/BNP results?,  ***  BMP/BNP/magnesium in 6 months,   Echocardiogram in 6 months,   Follow-up in 6 months    Lifestyle Recommendations  I recommend a whole-food plant-based diet, an eating pattern that encourages the consumption of unrefined plant foods (such as fruits, vegetables, tubers, whole grains, legumes, nuts and seeds) and discourages meats, dairy products, eggs and processed foods.     The AHA/ACC recommends that the patient consume a dietary pattern that emphasizes intake of vegetables, fruits, and whole grains; includes low-fat dairy products, poultry, fish, legumes, non-tropical vegetable oils, and nuts; and limits intake of sodium, sweets, sugar-sweetened beverages, and red meats.  Adapt this dietary pattern to appropriate calorie requirements (a 500-750 kcal/day deficit to loose weight), personal and cultural food preferences, and nutrition therapy for other medical conditions (including diabetes).  Achieve this pattern by following plans such as the [place] Mediterranean, DASH dietary pattern, or AHA diet.     Engage in 2 hours and 30  minutes per week of moderate-intensity physical activity, or 1 hour and 15 minutes (75 minutes) per week of vigorous-intensity aerobic physical activity, or an equivalent combination of moderate and vigorous-intensity aerobic physical activity. Aerobic activity should be performed in episodes of at least 10 minutes preferably spread throughout the week.     Adhering to a heart healthy diet, regular exercise habits, avoidance of tobacco products, and maintenance of a healthy weight are crucial components of their heart disease risk reduction.     Any positive review of systems not specifically addressed in the office visit today should be evaluated and treated by the patients primary care physician or in an emergency department if necessary     Patient was notified that results from ordered tests will be called to the patient if it changes current management; it will otherwise be discussed at a future appointment and available on  [employer].     Thank you for allowing me to participate in the care of this patient.        This document was generated using the assistance of voice recognition software. If there are any errors of spelling, grammar, syntax, or meaning; please feel free to contact me directly for clarification.    Past Medical History:  He has a past medical history of (HFpEF) heart failure with preserved ejection fraction, Arrhythmia, Chronic venous stasis dermatitis, Clotting disorder (Multi), Coronary artery disease involving native coronary artery of native heart (10/20/2023), Disease of thyroid gland, DVT (deep venous thrombosis) (Multi), History of anemia (10/19/2021), History of iron deficiency anemia (10/19/2021), Hydronephrosis, Hypertension, Hypogonadism in male, Lymphedema, May-Thurner syndrome, Morbid obesity (Multi), RADHA (obstructive sleep apnea), Paroxysmal A-fib (Multi), PVD (peripheral vascular disease), Pyelonephritis, Right kidney stone, Type 2 diabetes mellitus, Use of cane as ambulatory  aid, Venous insufficiency, Wears glasses, and Wound of left leg.    He has no past medical history of AAA (abdominal aortic aneurysm), Cancer (Multi), Carotid artery occlusion, or Heart murmur.    Past Surgical History:  He has a past surgical history that includes Cardiac catheterization (N/A, 10/13/2023); Cystoscopy; and Iliac vein angioplasty / stenting (Left).      Social History:  He reports that he has quit smoking. His smoking use included cigarettes. He started smoking about 29 years ago. He has a 20 pack-year smoking history. He has never been exposed to tobacco smoke. He has never used smokeless tobacco. He reports current alcohol use. He reports that he does not currently use drugs.    Family History:  No family history on file.     Allergies:  Patient has no known allergies.    Outpatient Medications:  Current Outpatient Medications   Medication Instructions    dilTIAZem CD (Cardizem CD) 120 mg 24 hr capsule TAKE 1 CAPSULE(120 MG) BY MOUTH EVERY DAY    lisinopril 20 mg, oral, Daily    magnesium oxide (Mag-Ox) 400 mg tablet 1 tablet, Daily    phenazopyridine (PYRIDIUM) 200 mg, oral, 3 times daily PRN, Take with food    sennosides-docusate sodium (Dania-Colace) 8.6-50 mg tablet 2 tablets, oral, 2 times daily    spironolactone (ALDACTONE) 25 mg, oral, Daily    traMADol (ULTRAM) 50 mg, oral, Every 6 hours PRN    warfarin (Coumadin) 4 mg tablet TAKE 1 TABLET EVERY DAY    warfarin (COUMADIN) 1 mg, oral        ROS:  A 14 point review of systems was done and is negative other than as stated in HPI    Vitals:      3/31/2025     5:27 AM 3/31/2025     8:18 AM 4/14/2025     8:03 AM 4/14/2025     2:00 PM 4/24/2025    11:04 AM 4/24/2025     2:14 PM 4/24/2025     2:29 PM   Vitals   Systolic 149 147 118 124 138 135 145   Diastolic 79 82 69 73 67 69 69   BP Location Right arm     Right arm Right arm   Heart Rate 66 70 78 82 88 79 81   Temp 36.3 °C (97.3 °F) 36.5 °C (97.7 °F)  36.6 °C (97.9 °F) 36.8 °C (98.2 °F) 36 °C  "(96.8 °F)    Resp 18   18 18 24 13   Height    1.778 m (5' 10\") 1.778 m (5' 10\")     Weight (lb)   321.9 323.19 304.01     BMI   46.19 kg/m2 46.37 kg/m2 43.62 kg/m2     BSA (m2)   2.69 m2 2.69 m2 2.61 m2          Physical Exam:   ***  Constitutional: Cooperative, in no acute distress, alert, appears stated age.   Skin: Skin color, texture, turgor normal. No rashes or lesions.   Head: Normocephalic. No masses, lesions, tenderness or abnormalities   Eyes: Extraocular movements are grossly intact.   Mouth and throat: Mucous membranes moist   Neck: Neck supple, no carotid bruits, no JVD   Respiratory: Lungs clear to auscultation, no wheezing or rhonchi, no use of accessory muscles   Chest wall: No scars, normal excursion with respiration   Cardiovascular: Regular rhythm without murmur  Gastrointestinal: Abdomen soft, nontender. Bowel sounds normal. Morbidly obese.  Musculoskeletal: Strength equal in upper extremities   Extremities: Trace to 1+ pitting edema  Neurologic: Sensation grossly intact, alert and oriented ×3     Intake/Output:   No intake/output data recorded.    Outpatient Medications  Current Outpatient Medications on File Prior to Visit   Medication Sig Dispense Refill    dilTIAZem CD (Cardizem CD) 120 mg 24 hr capsule TAKE 1 CAPSULE(120 MG) BY MOUTH EVERY DAY 90 capsule 3    lisinopril 20 mg tablet TAKE 1 TABLET(20 MG) BY MOUTH DAILY 90 tablet 0    magnesium oxide (Mag-Ox) 400 mg tablet Take 1 tablet (400 mg) by mouth once daily.      phenazopyridine (Pyridium) 200 mg tablet Take 1 tablet (200 mg) by mouth 3 times a day as needed for bladder spasms. Take with food 6 tablet 0    sennosides-docusate sodium (Dania-Colace) 8.6-50 mg tablet Take 2 tablets by mouth 2 times a day. 120 tablet 0    spironolactone (Aldactone) 25 mg tablet TAKE 1 TABLET BY MOUTH ONCE DAILY 180 tablet 1    traMADol (Ultram) 50 mg tablet Take 1 tablet (50 mg) by mouth every 6 hours if needed for severe pain (7 - 10). 12 tablet 0    " warfarin (Coumadin) 1 mg tablet TAKE 1 TABLET BY MOUTH EVERY DAY (Patient taking differently: Take 0.5 tablets (0.5 mg) by mouth.) 30 tablet 0    warfarin (Coumadin) 4 mg tablet TAKE 1 TABLET EVERY DAY 30 tablet 11     No current facility-administered medications on file prior to visit.       Labs: (past 26 weeks)  Recent Results (from the past 26 weeks)   ECG 12 lead (Clinic Performed)    Collection Time: 01/27/25 10:00 AM   Result Value Ref Range    Ventricular Rate 68 BPM    Atrial Rate 68 BPM    MN Interval 188 ms    QRS Duration 94 ms    QT Interval 360 ms    QTC Calculation(Bazett) 382 ms    P Axis 63 degrees    R Axis 28 degrees    T Axis 34 degrees    QRS Count 11 beats    Q Onset 222 ms    P Onset 128 ms    P Offset 193 ms    T Offset 402 ms    QTC Fredericia 375 ms   Basic Metabolic Panel    Collection Time: 01/27/25 12:59 PM   Result Value Ref Range    GLUCOSE 91 65 - 99 mg/dL    UREA NITROGEN (BUN) 20 7 - 25 mg/dL    CREATININE 0.86 0.70 - 1.35 mg/dL    EGFR 98 > OR = 60 mL/min/1.73m2    BUN/CREATININE RATIO SEE NOTE: 6 - 22 (calc)    SODIUM 140 135 - 146 mmol/L    POTASSIUM 4.6 3.5 - 5.3 mmol/L    CHLORIDE 104 98 - 110 mmol/L    CARBON DIOXIDE 26 20 - 32 mmol/L    ELECTROLYTE BALANCE 10 7 - 17 mmol/L (calc)    CALCIUM 9.9 8.6 - 10.3 mg/dL   B-Type Natriuretic Peptide    Collection Time: 01/27/25 12:59 PM   Result Value Ref Range    B TYPE NATRIURETIC PEPTIDE (BNP) 22 <100 pg/mL   Magnesium    Collection Time: 01/27/25 12:59 PM   Result Value Ref Range    MAGNESIUM 2.2 1.5 - 2.5 mg/dL   Protime-INR    Collection Time: 01/27/25  1:02 PM   Result Value Ref Range    INR 1.2 (H)     PT 12.4 (H) 9.0 - 11.5 sec   POCT INR manually resulted    Collection Time: 02/10/25  9:46 AM   Result Value Ref Range    POC INR 1.7 (A) 0.9 - 1.1    POC Prothrombin Time 20.4 (A) 9.3 - 12.5   ECG 12 lead    Collection Time: 03/28/25  9:40 PM   Result Value Ref Range    Ventricular Rate 94 BPM    Atrial Rate 93 BPM    MN  Interval 164 ms    QRS Duration 97 ms    QT Interval 320 ms    QTC Calculation(Bazett) 401 ms    P Axis 61 degrees    R Axis 28 degrees    T Axis 26 degrees    QRS Count 15 beats    Q Onset 251 ms    T Offset 411 ms    QTC Fredericia 371 ms   CBC and Auto Differential    Collection Time: 03/28/25  9:48 PM   Result Value Ref Range    WBC 12.9 (H) 4.4 - 11.3 x10*3/uL    nRBC 0.0 0.0 - 0.0 /100 WBCs    RBC 4.13 (L) 4.50 - 5.90 x10*6/uL    Hemoglobin 11.2 (L) 13.5 - 17.5 g/dL    Hematocrit 35.3 (L) 41.0 - 52.0 %    MCV 86 80 - 100 fL    MCH 27.1 26.0 - 34.0 pg    MCHC 31.7 (L) 32.0 - 36.0 g/dL    RDW 17.1 (H) 11.5 - 14.5 %    Platelets 186 150 - 450 x10*3/uL    Neutrophils % 80.3 40.0 - 80.0 %    Immature Granulocytes %, Automated 0.5 0.0 - 0.9 %    Lymphocytes % 7.0 13.0 - 44.0 %    Monocytes % 11.9 2.0 - 10.0 %    Eosinophils % 0.1 0.0 - 6.0 %    Basophils % 0.2 0.0 - 2.0 %    Neutrophils Absolute 10.38 (H) 1.20 - 7.70 x10*3/uL    Immature Granulocytes Absolute, Automated 0.07 0.00 - 0.70 x10*3/uL    Lymphocytes Absolute 0.91 (L) 1.20 - 4.80 x10*3/uL    Monocytes Absolute 1.54 (H) 0.10 - 1.00 x10*3/uL    Eosinophils Absolute 0.01 0.00 - 0.70 x10*3/uL    Basophils Absolute 0.02 0.00 - 0.10 x10*3/uL   Magnesium    Collection Time: 03/28/25  9:48 PM   Result Value Ref Range    Magnesium 2.01 1.60 - 2.40 mg/dL   Comprehensive metabolic panel    Collection Time: 03/28/25  9:48 PM   Result Value Ref Range    Glucose 121 (H) 74 - 99 mg/dL    Sodium 130 (L) 136 - 145 mmol/L    Potassium 3.9 3.5 - 5.3 mmol/L    Chloride 98 98 - 107 mmol/L    Bicarbonate 23 21 - 32 mmol/L    Anion Gap 13 10 - 20 mmol/L    Urea Nitrogen 22 6 - 23 mg/dL    Creatinine 1.36 (H) 0.50 - 1.30 mg/dL    eGFR 59 (L) >60 mL/min/1.73m*2    Calcium 8.9 8.6 - 10.3 mg/dL    Albumin 3.1 (L) 3.4 - 5.0 g/dL    Alkaline Phosphatase 65 33 - 136 U/L    Total Protein 6.8 6.4 - 8.2 g/dL    AST 22 9 - 39 U/L    Bilirubin, Total 1.1 0.0 - 1.2 mg/dL    ALT 24 10 - 52  U/L   Lipase    Collection Time: 03/28/25  9:48 PM   Result Value Ref Range    Lipase 47 9 - 82 U/L   Lactate    Collection Time: 03/28/25  9:48 PM   Result Value Ref Range    Lactate 0.8 0.4 - 2.0 mmol/L   Troponin I, High Sensitivity    Collection Time: 03/28/25  9:48 PM   Result Value Ref Range    Troponin I, High Sensitivity 10 0 - 20 ng/L   Protime-INR    Collection Time: 03/28/25  9:48 PM   Result Value Ref Range    Protime 16.6 (H) 9.8 - 12.4 seconds    INR 1.5 (H) 0.9 - 1.1   Sars-CoV-2 PCR    Collection Time: 03/28/25  9:48 PM   Result Value Ref Range    Coronavirus 2019, PCR Not Detected Not Detected   Influenza A, and B PCR    Collection Time: 03/28/25  9:48 PM   Result Value Ref Range    Flu A Result Not Detected Not Detected    Flu B Result Not Detected Not Detected   Urinalysis with Reflex Culture and Microscopic    Collection Time: 03/28/25 11:46 PM   Result Value Ref Range    Color, Urine Light-Yellow Light-Yellow, Yellow, Dark-Yellow    Appearance, Urine Clear Clear    Specific Gravity, Urine 1.015 1.005 - 1.035    pH, Urine 8.0 5.0, 5.5, 6.0, 6.5, 7.0, 7.5, 8.0    Protein, Urine 30 (1+) (A) NEGATIVE, 10 (TRACE), 20 (TRACE) mg/dL    Glucose, Urine Normal Normal mg/dL    Blood, Urine 0.2 (2+) (A) NEGATIVE mg/dL    Ketones, Urine NEGATIVE NEGATIVE mg/dL    Bilirubin, Urine NEGATIVE NEGATIVE mg/dL    Urobilinogen, Urine Normal Normal mg/dL    Nitrite, Urine 2+ (A) NEGATIVE    Leukocyte Esterase, Urine 500 Roberto Carlos/uL (A) NEGATIVE   Extra Urine Gray Tube    Collection Time: 03/28/25 11:46 PM   Result Value Ref Range    Extra Tube Hold for add-ons.    Microscopic Only, Urine    Collection Time: 03/28/25 11:46 PM   Result Value Ref Range    WBC, Urine 11-20 (A) 1-5, NONE /HPF    RBC, Urine NONE NONE, 1-2, 3-5 /HPF    Squamous Epithelial Cells, Urine 1-9 (SPARSE) Reference range not established. /HPF    Bacteria, Urine 1+ (A) NONE SEEN /HPF    Mucus, Urine FEW Reference range not established. /LPF    Amorphous  Crystals, Urine 1+ NONE, 1+, 2+ /HPF   Urine Culture    Collection Time: 03/28/25 11:46 PM    Specimen: Clean Catch/Voided; Urine   Result Value Ref Range    Urine Culture >=100,000 CFU/mL Proteus mirabilis (A)        Susceptibility    Proteus mirabilis - MICROSCAN     Ampicillin  Susceptible ug/ml     Cefazolin  Susceptible ug/ml     Cefazolin (uncomplicated UTIs only)  Susceptible ug/ml     Piperacillin/Tazobactam  Susceptible ug/ml     Nitrofurantoin  Resistant ug/ml     Trimethoprim/Sulfamethoxazole  Susceptible ug/ml     Ciprofloxacin  Susceptible ug/ml     Tetracycline  Resistant ug/ml     Gentamicin  Susceptible ug/ml   Renal Function Panel    Collection Time: 03/29/25 12:38 PM   Result Value Ref Range    Glucose 104 (H) 74 - 99 mg/dL    Sodium 131 (L) 136 - 145 mmol/L    Potassium 4.1 3.5 - 5.3 mmol/L    Chloride 97 (L) 98 - 107 mmol/L    Bicarbonate 25 21 - 32 mmol/L    Anion Gap 13 10 - 20 mmol/L    Urea Nitrogen 21 6 - 23 mg/dL    Creatinine 1.34 (H) 0.50 - 1.30 mg/dL    eGFR 60 (L) >60 mL/min/1.73m*2    Calcium 9.1 8.6 - 10.3 mg/dL    Phosphorus 2.9 2.5 - 4.9 mg/dL    Albumin 3.5 3.4 - 5.0 g/dL   Magnesium    Collection Time: 03/29/25 12:38 PM   Result Value Ref Range    Magnesium 2.11 1.60 - 2.40 mg/dL   Procalcitonin    Collection Time: 03/29/25 12:38 PM   Result Value Ref Range    Procalcitonin 1.68 (H) <=0.07 ng/mL   C-reactive protein    Collection Time: 03/29/25 12:38 PM   Result Value Ref Range    C-Reactive Protein 24.09 (H) <1.00 mg/dL   Protime-INR    Collection Time: 03/29/25 12:38 PM   Result Value Ref Range    Protime 15.7 (H) 9.8 - 12.4 seconds    INR 1.4 (H) 0.9 - 1.1   Blood Culture    Collection Time: 03/29/25 12:38 PM    Specimen: Peripheral Venipuncture; Blood culture   Result Value Ref Range    Blood Culture No growth at 4 days -  FINAL REPORT    Blood Culture    Collection Time: 03/29/25 12:38 PM    Specimen: Peripheral Venipuncture; Blood culture   Result Value Ref Range    Blood  Culture No growth at 4 days -  FINAL REPORT    CBC and Auto Differential    Collection Time: 03/29/25 12:39 PM   Result Value Ref Range    WBC 13.4 (H) 4.4 - 11.3 x10*3/uL    nRBC 0.0 0.0 - 0.0 /100 WBCs    RBC 4.10 (L) 4.50 - 5.90 x10*6/uL    Hemoglobin 11.6 (L) 13.5 - 17.5 g/dL    Hematocrit 36.4 (L) 41.0 - 52.0 %    MCV 89 80 - 100 fL    MCH 28.3 26.0 - 34.0 pg    MCHC 31.9 (L) 32.0 - 36.0 g/dL    RDW 17.2 (H) 11.5 - 14.5 %    Platelets 195 150 - 450 x10*3/uL    Neutrophils % 75.6 40.0 - 80.0 %    Immature Granulocytes %, Automated 1.0 (H) 0.0 - 0.9 %    Lymphocytes % 10.1 13.0 - 44.0 %    Monocytes % 12.4 2.0 - 10.0 %    Eosinophils % 0.7 0.0 - 6.0 %    Basophils % 0.2 0.0 - 2.0 %    Neutrophils Absolute 10.13 (H) 1.20 - 7.70 x10*3/uL    Immature Granulocytes Absolute, Automated 0.13 0.00 - 0.70 x10*3/uL    Lymphocytes Absolute 1.36 1.20 - 4.80 x10*3/uL    Monocytes Absolute 1.67 (H) 0.10 - 1.00 x10*3/uL    Eosinophils Absolute 0.10 0.00 - 0.70 x10*3/uL    Basophils Absolute 0.03 0.00 - 0.10 x10*3/uL   Lactate    Collection Time: 03/29/25 12:39 PM   Result Value Ref Range    Lactate 0.8 0.4 - 2.0 mmol/L   Urine Culture    Collection Time: 03/29/25  2:18 PM    Specimen: Indwelling ([place]avera) Catheter; Urine   Result Value Ref Range    Urine Culture       Growth indicates contamination with mixed bacterial fide. Repeat culture if clinically indicated.   POCT GLUCOSE    Collection Time: 03/29/25  2:53 PM   Result Value Ref Range    POCT Glucose 108 (H) 74 - 99 mg/dL   Renal Function Panel    Collection Time: 03/30/25  6:41 AM   Result Value Ref Range    Glucose 160 (H) 74 - 99 mg/dL    Sodium 133 (L) 136 - 145 mmol/L    Potassium 4.0 3.5 - 5.3 mmol/L    Chloride 100 98 - 107 mmol/L    Bicarbonate 24 21 - 32 mmol/L    Anion Gap 13 10 - 20 mmol/L    Urea Nitrogen 20 6 - 23 mg/dL    Creatinine 1.21 0.50 - 1.30 mg/dL    eGFR 68 >60 mL/min/1.73m*2    Calcium 8.4 (L) 8.6 - 10.3 mg/dL    Phosphorus 3.5 2.5 - 4.9 mg/dL     Albumin 3.0 (L) 3.4 - 5.0 g/dL   Magnesium    Collection Time: 03/30/25  6:41 AM   Result Value Ref Range    Magnesium 2.03 1.60 - 2.40 mg/dL   CBC and Auto Differential    Collection Time: 03/30/25  6:41 AM   Result Value Ref Range    WBC 10.2 4.4 - 11.3 x10*3/uL    nRBC 0.0 0.0 - 0.0 /100 WBCs    RBC 3.82 (L) 4.50 - 5.90 x10*6/uL    Hemoglobin 10.5 (L) 13.5 - 17.5 g/dL    Hematocrit 34.6 (L) 41.0 - 52.0 %    MCV 91 80 - 100 fL    MCH 27.5 26.0 - 34.0 pg    MCHC 30.3 (L) 32.0 - 36.0 g/dL    RDW 17.2 (H) 11.5 - 14.5 %    Platelets 204 150 - 450 x10*3/uL    Neutrophils % 73.1 40.0 - 80.0 %    Immature Granulocytes %, Automated 1.1 (H) 0.0 - 0.9 %    Lymphocytes % 11.9 13.0 - 44.0 %    Monocytes % 11.6 2.0 - 10.0 %    Eosinophils % 1.9 0.0 - 6.0 %    Basophils % 0.4 0.0 - 2.0 %    Neutrophils Absolute 7.48 1.20 - 7.70 x10*3/uL    Immature Granulocytes Absolute, Automated 0.11 0.00 - 0.70 x10*3/uL    Lymphocytes Absolute 1.22 1.20 - 4.80 x10*3/uL    Monocytes Absolute 1.19 (H) 0.10 - 1.00 x10*3/uL    Eosinophils Absolute 0.19 0.00 - 0.70 x10*3/uL    Basophils Absolute 0.04 0.00 - 0.10 x10*3/uL   Procalcitonin    Collection Time: 03/30/25  6:41 AM   Result Value Ref Range    Procalcitonin 0.78 (H) <=0.07 ng/mL   C-reactive protein    Collection Time: 03/30/25  6:41 AM   Result Value Ref Range    C-Reactive Protein 18.63 (H) <1.00 mg/dL   Protime-INR    Collection Time: 03/30/25  6:41 AM   Result Value Ref Range    Protime 14.0 (H) 9.8 - 12.4 seconds    INR 1.3 (H) 0.9 - 1.1   Renal Function Panel    Collection Time: 03/31/25  6:29 AM   Result Value Ref Range    Glucose 157 (H) 74 - 99 mg/dL    Sodium 138 136 - 145 mmol/L    Potassium 4.3 3.5 - 5.3 mmol/L    Chloride 104 98 - 107 mmol/L    Bicarbonate 25 21 - 32 mmol/L    Anion Gap 13 10 - 20 mmol/L    Urea Nitrogen 19 6 - 23 mg/dL    Creatinine 1.09 0.50 - 1.30 mg/dL    eGFR 77 >60 mL/min/1.73m*2    Calcium 8.2 (L) 8.6 - 10.3 mg/dL    Phosphorus 4.2 2.5 - 4.9 mg/dL     Albumin 2.9 (L) 3.4 - 5.0 g/dL   Magnesium    Collection Time: 03/31/25  6:29 AM   Result Value Ref Range    Magnesium 1.92 1.60 - 2.40 mg/dL   CBC and Auto Differential    Collection Time: 03/31/25  6:29 AM   Result Value Ref Range    WBC 8.1 4.4 - 11.3 x10*3/uL    nRBC 0.0 0.0 - 0.0 /100 WBCs    RBC 3.89 (L) 4.50 - 5.90 x10*6/uL    Hemoglobin 10.6 (L) 13.5 - 17.5 g/dL    Hematocrit 35.7 (L) 41.0 - 52.0 %    MCV 92 80 - 100 fL    MCH 27.2 26.0 - 34.0 pg    MCHC 29.7 (L) 32.0 - 36.0 g/dL    RDW 17.2 (H) 11.5 - 14.5 %    Platelets 247 150 - 450 x10*3/uL    Neutrophils % 63.8 40.0 - 80.0 %    Immature Granulocytes %, Automated 1.0 (H) 0.0 - 0.9 %    Lymphocytes % 21.6 13.0 - 44.0 %    Monocytes % 9.2 2.0 - 10.0 %    Eosinophils % 3.8 0.0 - 6.0 %    Basophils % 0.6 0.0 - 2.0 %    Neutrophils Absolute 5.18 1.20 - 7.70 x10*3/uL    Immature Granulocytes Absolute, Automated 0.08 0.00 - 0.70 x10*3/uL    Lymphocytes Absolute 1.76 1.20 - 4.80 x10*3/uL    Monocytes Absolute 0.75 0.10 - 1.00 x10*3/uL    Eosinophils Absolute 0.31 0.00 - 0.70 x10*3/uL    Basophils Absolute 0.05 0.00 - 0.10 x10*3/uL   Procalcitonin    Collection Time: 03/31/25  6:29 AM   Result Value Ref Range    Procalcitonin 0.48 (H) <=0.07 ng/mL   C-reactive protein    Collection Time: 03/31/25  6:29 AM   Result Value Ref Range    C-Reactive Protein 11.64 (H) <1.00 mg/dL   Protime-INR    Collection Time: 03/31/25  6:29 AM   Result Value Ref Range    Protime 13.1 (H) 9.8 - 12.4 seconds    INR 1.2 (H) 0.9 - 1.1   Morphology    Collection Time: 03/31/25  6:29 AM   Result Value Ref Range    RBC Morphology See Below     Hypochromia Mild    Urine Culture    Collection Time: 04/07/25  2:00 PM    Specimen: Clean Catch/Voided; Urine   Result Value Ref Range    CULTURE, URINE, ROUTINE SEE NOTE    Calculi (Stone) Analysis    Collection Time: 04/24/25  1:30 PM   Result Value Ref Range    Calculi Mass 91 mg    Calculi Description See Note     Calculi Composition See Note     POCT GLUCOSE    Collection Time: 04/24/25  2:21 PM   Result Value Ref Range    POCT Glucose 101 (H) 74 - 99 mg/dL   POCT UA Automated manually resulted    Collection Time: 05/23/25 11:08 AM   Result Value Ref Range    POC Color, Urine Yellow Straw, Yellow, Light-Yellow    POC Appearance, Urine Clear Clear    POC Glucose, Urine NEGATIVE NEGATIVE mg/dl    POC Bilirubin, Urine NEGATIVE NEGATIVE    POC Ketones, Urine NEGATIVE NEGATIVE mg/dl    POC Specific Gravity, Urine 1.020 1.005 - 1.035    POC Blood, Urine TRACE-Intact (A) NEGATIVE    POC PH, Urine 6.5 No Reference Range Established PH    POC Protein, Urine NEGATIVE NEGATIVE mg/dl    POC Urobilinogen, Urine 0.2 0.2, 1.0 EU/DL    Poc Nitrite, Urine POSITIVE (A) NEGATIVE    POC Leukocytes, Urine LARGE (3+) (A) NEGATIVE   Urine Culture    Collection Time: 05/23/25 11:17 AM    Specimen: Clean Catch/Voided; Urine   Result Value Ref Range    CULTURE, URINE, ROUTINE SEE NOTE (A)        ECG  Encounter Date: 03/28/25   ECG 12 lead   Result Value    Ventricular Rate 94    Atrial Rate 93    ND Interval 164    QRS Duration 97    QT Interval 320    QTC Calculation(Bazett) 401    P Axis 61    R Axis 28    T Axis 26    QRS Count 15    Q Onset 251    T Offset 411    QTC Fredericia 371    Narrative    Sinus rhythm  Minimal ST elevation, anterior leads    See ED provider note for full interpretation and clinical correlation  Confirmed by Bisi Coley ([id number]) on 4/2/2025 1:28:44 PM       Echocardiogram  No results found for this or any previous visit from the past 1095 days.      CV Studies:  EKG:  Encounter Date: 03/28/25   ECG 12 lead   Result Value    Ventricular Rate 94    Atrial Rate 93    ND Interval 164    QRS Duration 97    QT Interval 320    QTC Calculation(Bazett) 401    P Axis 61    R Axis 28    T Axis 26    QRS Count 15    Q Onset 251    T Offset 411    QTC Fredericia 371    Narrative    Sinus rhythm  Minimal ST elevation, anterior leads    See ED provider note  for full interpretation and clinical correlation  Confirmed by Bisi Coley ([id number]) on 4/2/2025 1:28:44 PM     Echocardiogram:   [place] [address]  Phone [phone] Fax [phone]    TRANSTHORACIC ECHOCARDIOGRAM REPORT      Patient Name:     POLINA MCCORMACK Reading Physician:   [id number] Latricia Moseley MD  Study Date:       9/25/2023     Referring Physician: FLOR VALLADARES  MRN/PID:          [id number]      PCP:  Accession/Order#: [id number]  Department Location: [facility]  YOB: 1960     Fellow:  Gender:           M             Nurse:               Malu Freeman RN  Admit Date:                     Sonographer:         Ronda Mae [facility]  Admission Status: Outpatient    Additional Staff:  Height:           177.80 cm     CC Report to:  Weight:           169.19 kg     Study Type:          Echocardiogram  BSA:              2.72 m2  Blood Pressure: 170 /84 mmHg    Diagnosis/ICD: I10-Essential (primary) hypertension; R06.02-Shortness of breath  Indication:    Dyspnea on Exertion  Procedure/CPT: Echo Complete w Full Doppler-32956    Patient History:  Pertinent History: HTN.    Study Detail: The following Echo studies were performed: 2D, M-Mode, Doppler and  color flow. Technically challenging study due to body habitus and  prominent lung artifact. Optison used as a contrast agent for  endocardial border definition. Total contrast used for this  procedure was 3 mL via IV push.      PHYSICIAN INTERPRETATION:  Left Ventricle: Left ventricular systolic function is normal, with an estimated ejection fraction of 60-65%. There are no regional wall motion abnormalities. The left ventricular cavity size is normal. Spectral Doppler shows an impaired relaxation pattern of left ventricular diastolic filling.  Left Atrium: The left atrium is normal in size.  Right Ventricle: The right ventricle is normal in size. There is  normal right ventricular global systolic function.  Right Atrium: The right atrium is normal in size.  Aortic Valve: The aortic valve was not well visualized. There is no evidence of aortic valve regurgitation. The peak instantaneous gradient of the aortic valve is 12.7 mmHg. The mean gradient of the aortic valve is 6.9 mmHg.  Mitral Valve: The mitral valve is normal in structure. There is no evidence of mitral valve regurgitation.  Tricuspid Valve: The tricuspid valve is structurally normal. No evidence of tricuspid regurgitation.  Pulmonic Valve: The pulmonic valve is structurally normal. There is no indication of pulmonic valve regurgitation.  Pericardium: There is no pericardial effusion noted.  Aorta: The aortic root is normal.      CONCLUSIONS:  1. Left ventricular systolic function is normal with a 60-65% estimated ejection fraction.  2. Poorly visualized anatomical structures due to suboptimal image quality.  3. Spectral Doppler shows an impaired relaxation pattern of left ventricular diastolic filling.    QUANTITATIVE DATA SUMMARY:  2D MEASUREMENTS:  Normal Ranges:  LAs:           4.45 cm   (2.7-4.0cm)  IVSd:          0.75 cm   (0.6-1.1cm)  LVPWd:         0.82 cm   (0.6-1.1cm)  LVIDd:         5.83 cm   (3.9-5.9cm)  LVIDs:         4.67 cm  LV Mass Index: 63.6 g/m2  LV % FS        19.9 %    LA VOLUME:  Normal Ranges:  LA Vol A4C:        79.5 ml    (22+/-6mL/m2)  LA Vol A2C:        67.4 ml  LA Vol BP:         77.9 ml  LA Vol Index A4C:  29.2 ml/m2  LA Vol Index A2C:  24.8 ml/m2  LA Vol Index BP:   28.6 ml/m2  LA Area A4C:       25.4 cm2  LA Area A2C:       22.0 cm2  LA Major Axis A4C: 6.9 cm  LA Major Axis A2C: 6.1 cm  LA Volume Index:   28.6 ml/m2  LA Vol A4C:        74.6 ml  LA Vol A2C:        60.4 ml    RA VOLUME BY A/L METHOD:  Normal Ranges:  RA Area A4C: 14.9 cm2    AORTA MEASUREMENTS:  Normal Ranges:  Ao Sinus, d: 3.50 cm (2.1-3.5cm)  Ao STJ, d:   2.70 cm (1.7-3.4cm)  Asc Ao, d:   3.50 cm  (2.1-3.4cm)    LV SYSTOLIC FUNCTION BY 2D PLANIMETRY (MOD):  Normal Ranges:  EF-A4C View: 64.4 % (>=55%)  EF-A2C View: 57.6 %  EF-Biplane:  59.7 %    LV DIASTOLIC FUNCTION:  Normal Ranges:  MV Peak E:    0.82 m/s    (0.7-1.2 m/s)  MV Peak A:    1.13 m/s    (0.42-0.7 m/s)  E/A Ratio:    0.73        (1.0-2.2)  MV e'         0.10 m/s    (>8.0)  MV lateral e' 0.11 m/s  MV medial e'  0.10 m/s  MV A Dur:     131.30 msec  E/e' Ratio:   7.85        (<8.0)    MITRAL VALVE:  Normal Ranges:  MV DT: 205 msec (150-240msec)    AORTIC VALVE:  Normal Ranges:  AoV Vmax:                1.78 m/s  (<=1.7m/s)  AoV Peak P.7 mmHg (<20mmHg)  AoV Mean P.9 mmHg  (1.7-11.5mmHg)  LVOT Max Franck:            1.02 m/s  (<=1.1m/s)  AoV VTI:                 35.30 cm  (18-25cm)  LVOT VTI:                21.21 cm  LVOT Diameter:           2.22 cm   (1.8-2.4cm)  AoV Area, VTI:           2.32 cm2  (2.5-5.5cm2)  AoV Area,Vmax:           2.21 cm2  (2.5-4.5cm2)  AoV Dimensionless Index: 0.60      RIGHT VENTRICLE:  RV Basal 3.00 cm  RV Mid   2.40 cm  RV Major 8.4 cm  TAPSE:   22.2 mm  RV s'    0.13 m/s    AORTA:  Asc Ao Diam 3.54 cm      [id number] Latricia Moseley MD  Electronically signed on 2023 at 11:37:05 AM         Final     Stress Testing IMGRESULT(HUG2362:1:1825): No results found for this or any previous visit from the past  days.    Cardiac Catheterization: No results found for this or any previous visit from the past 1825 days.  No results found for this or any previous visit from the past 3650 days.     Cardiac Scoring:   CT heart calcium scoring wo IV contrast 2022    Narrative  MRN: [id number]  Patient Name: POLINA MCCORMACK    STUDY:  CT CARDIAC SCORING;  2022 10:25 am    INDICATION:  cardiac screening  Z13.6: Screening for cardiovascular condition.    COMPARISON:  None.    ACCESSION NUMBER(S):  [id number]    ORDERING CLINICIAN:  ALYSON RAMIREZ    TECHNIQUE:  Using prospective ECG gating, CT scan of  "the coronary arteries was  performed without intravenous contrast. Coronary calcium scoring  was  performed according to the method of Agatston.    FINDINGS:  The score and distribution of calcium in the coronary arteries is as  follows:    LM 0,  LAD 20.27,  LCx 0,  RCA 0,    Total 20.27    The visualized mid/lower ascending thoracic aorta measures 3.7 cm in  diameter. The heart is normal in size. No pericardial effusion is  present.    No gross evidence of mediastinal or hilar lymphadenopathy or masses  is identified. The visualized segments of the lungs are normally  expanded.    The visualized subdiaphragmatic structures appear intact. Fatty liver.    Impression  1. Coronary artery calcium score of 20.27*.  2. Fatty liver.    *Coronary artery calcium scoring may be helpful in predicting the  risk for future coronary heart disease events.  According to the  American College of Cardiology Foundation Clinical Expert Consensus  Task Force, such testing provides important prognostic information in  patients with more than one coronary heart disease risk factor. The  coronary artery calcium score correlates with the annual risk of a  non-fatal myocardial infarction or coronary heart disease death.    Coronary artery score            Annual Risk    0-99                             0.4%  100-399                        1.3%  >400                            2.4%    These three \"breakpoints\" correspond to lower, intermediate and high  risk states for future coronary events.  Such information should be  used, along with appropriate clinical judgment, to make decisions  regarding the intensity of risk factor management strategies to treat  blood lipids and to modify other non-lipid coronary risk factors.    Reference: Barnet P et al. Circulation.  2007; 115:402-426    AAA : No results found for this or any previous visit from the past 1825 days.    OTHER: No results found for this or any previous visit from the past 1825 " days.    LAST IMAGING RESULTS  Vascular US Aorta Iliac Duplex Complete                [address] [address]       Phone [phone] Fax [phone]       Vascular Lab Report     VASC US AORTA ILIAC DUPLEX COMPLETE    Patient Name:      POLINA Stacy Physician:  [id number] Latricia Moseley MD  Study Date:        6/2/2025             Ordering Provider:  [id number] CADET  MRN/PID:           [id number]             Fellow:  Accession#:        [id number]         Technologist:       Isabel Martinez RVT  Date of Birth/Age: 1960 / 64 years Technologist 2:  Gender:            M                    Encounter#:         [id number]  Admission Status:  Outpatient           Location Performed: [facility]       Diagnosis/ICD: Compression of vein-I87.1  CPT Codes:     18369 Duplex Aorta/IVC/Iliac/Bypass Graft       Pertinent History: Left iliac vein stenting.       CONCLUSIONS:  Aorta/Common Iliac Arteries/IVC: The inferior vena cava appears patent with no evidence of thrombosis. Stent walls are not visualized(body habitus). The IVC and left iliac vein appear widely patent.     Imaging & Doppler Findings:     [id number] Moseley MD  Electronically signed by Maria Esther Moseley MD on 6/3/2025 at 10:46:00 AM       ** Final **  Vascular US lower extremity venous insufficiency right                [address] [address]       Phone [phone] Fax [phone]       Vascular Lab Report     VASC US LOWER EXTREMITY VENOUS INSUFFICIENCY RIGHT    Patient Name:      POLINA Stacy Physician:  [id number] Latricia Moseley MD  Study Date:        6/2/2025             Ordering Provider:  [id number] CADET  MRN/PID:           [id number]             Fellow:  Accession#:        [id number]          Technologist:       Isabel Martinez RVT  Date of Birth/Age: 1960 / 64 years Technologist 2:  Gender:            M                    Encounter#:         [id number]  Admission Status:  Outpatient           Location Performed: [facility]       Diagnosis/ICD:    Localized (leg) edema-R60.0  CPT Codes:        37511 Venous reflux study VV VI Limited  Patient Position: Study performed in a reverse Trendelenburg position.       Pertinent History: H/O bilateral RFA's.       **CRITICAL RESULT**  Critical Result: DVT of RT Gastroc  Notification called to Dr Camejo on 6/2/2025 at 1:11:39 PM. Acknowledged critical results notification communicated via Epic chat by Isabel Martinez RVT.     CONCLUSIONS:  Right Lower Venous Insufficiency: Reflux is noted in the saphenofemoral junction vein. There is reflux noted in the common femoral and popliteal veins. The GSV is not seen in the thigh(RFA).  There are varicosities noted in the mid thigh with SVT noted. Unable to determine where they arise.  SVT is noted in the GSV in the mid and distal calf and in varicosites of the calf. SVT is also noted in the SSV/Vein of Giacomini.  Right Lower Venous: No evidence of acute deep vein thrombus visualized in the right lower extremity. There is age indeterminate deep vein thrombosis visualized in the gastrocnemius veins in the calf vein. Cannot rule out thrombus in non-visualized peroneal vein due to edema and body habitus.     Imaging & Doppler Findings:     Right          Compress     Thrombus       Diam   Depth    Time  SFJ              Yes          None        7.8 mm 31.2 mm 2.20 sec  Prox Calf GSV    Yes          None  Mid Calf GSV     Yes    Age indeterminate  Dist Calf GSV    Yes    Age indeterminate  SPJ              Yes          None  SSV Prox         Yes    Age Indeterminate  SSV Mid          Yes    Age Indeterminate  SSV Distal       Yes          None  Common FV                                                2.49  sec  Popliteal Vein                                           2.61 sec       Right                 Compressible Thrombus        Flow  Distal External Iliac                None  CFV                       Yes        None         Reflux  PFV                       Yes        None  FV Proximal               Yes        None   Spontaneous/Phasic  FV Mid                    Yes        None  FV Distal                 Yes        None  Popliteal                 Yes        None         Reflux  PTV                       Yes        None  Gastroc                 Partial     ? Age   dilated/echogenic       [id number] Moseley MD  Electronically signed by Maria Esther Moseley MD on 6/3/2025 at 10:44:55 AM       ** Final **  Vascular US ankle brachial index (CHRISTIANO) [place] [address]       Phone [phone] Fax [phone]       Vascular Lab Report     [facility] US ANKLE BRACHIAL INDEX (CHRISTIANO) WITHOUT EXERCISE    Patient Name:      POLINA Stacy Physician:  [id number] Moseley MD  Study Date:        6/2/2025             Ordering Provider:  [id number] LUIS MANUEL CADET  MRN/PID:           [id number]             Fellow:  Accession#:        [id number]         Technologist:       Isabel Martinez RVT  Date of Birth/Age: 1960 / 64 years Technologist 2:  Gender:            M                    Encounter#:         [id number]  Admission Status:  Outpatient           Location Performed: [facility]       Diagnosis/ICD: Peripheral vascular disease, unspecified-I73.9  CPT Codes:     71990 Peripheral artery CHRISTIANO Only       CONCLUSIONS:  Right Lower PVR: No evidence of arterial occlusive disease in the right lower extremity at rest. Normal digital perfusion noted. Multiphasic flow is noted in the right common femoral artery, right posterior tibial artery and right dorsalis pedis  artery.  Left Lower PVR: No evidence of arterial occlusive disease in the left lower extremity at rest. Normal digital perfusion noted. Multiphasic flow is noted in the left common femoral artery and left dorsalis pedis artery. Unable to obtain PTA due to dry/hardened skin.     Comparison:  Compared with study from 5/15/2023, no significant change.     Imaging & Doppler Findings:     RIGHT Lower PVR                Pressures Ratios  Right Posterior Tibial (Ankle) 182 mmHg  1.27  Right Dorsalis Pedis (Ankle)   167 mmHg  1.17  Right Digit (Great Toe)        109 mmHg  0.76          LEFT Lower PVR              Pressures Ratios  Left Dorsalis Pedis (Ankle) 183 mmHg  1.28  Left Digit (Great Toe)      98 mmHg   0.69                             Right     Left  Brachial Pressure 141 mmHg 143 mmHg          [id number] Latricia Moseley MD  Electronically signed by [id number] Latricia Moseley MD on 6/3/2025 at 10:36:35 AM       ** Final **      Problem List Items Addressed This Visit       Chronic venous stasis dermatitis of both lower extremities    HTN (hypertension)    Chronic fatigue    Morbid obesity (Multi)    Coronary artery disease involving native coronary artery of native heart    (HFpEF) heart failure with preserved ejection fraction - Primary    RADHA (obstructive sleep apnea)    Overview   9/28/23 Split night PSG ([place], BMI 56.7, ESS 23), all-supine, no optimal pressure up to CPAP 20 cm H20 (all hypopneas), switched to biPAP, centrals emerged (last obs ap at EPAP 14 cm H20--? mixed apnea); no optimal pressure noted. (+) PLMs noted.         Peripheral arterial disease          Gorge Medeiros DO, FACC, FACOI

## 2025-07-10 ENCOUNTER — TELEPHONE (OUTPATIENT)
Dept: PRIMARY CARE | Facility: CLINIC | Age: 65
End: 2025-07-10
Payer: COMMERCIAL

## 2025-07-10 DIAGNOSIS — I48.0 PAROXYSMAL ATRIAL FIBRILLATION (MULTI): Primary | ICD-10-CM

## 2025-07-14 NOTE — TELEPHONE ENCOUNTER
Gege is the [facility] right at his work they need a lab order stating to check his PTINR however often you would like and they will do it for free as an accommodation in his benefits at work. He will not go to [facility] ever again they can not use the [facility] order in chart  Once written I can fax it over to them and we can start getting his INR's regularly DAVID

## 2025-07-15 NOTE — TELEPHONE ENCOUNTER
Watson liu# is [phone] fyi the clinic nurse  INR will be done later today and will return in 24-72 hours ?? FYI

## 2025-07-18 DIAGNOSIS — R53.82 CHRONIC FATIGUE: ICD-10-CM

## 2025-07-18 DIAGNOSIS — E66.01 MORBID OBESITY (MULTI): ICD-10-CM

## 2025-07-18 DIAGNOSIS — I50.32 CHRONIC HEART FAILURE WITH PRESERVED EJECTION FRACTION: ICD-10-CM

## 2025-07-18 DIAGNOSIS — R06.02 SOBOE (SHORTNESS OF BREATH ON EXERTION): ICD-10-CM

## 2025-07-18 DIAGNOSIS — G47.33 OSA (OBSTRUCTIVE SLEEP APNEA): ICD-10-CM

## 2025-07-18 DIAGNOSIS — R07.89 OTHER CHEST PAIN: ICD-10-CM

## 2025-07-18 DIAGNOSIS — R53.83 OTHER FATIGUE: ICD-10-CM

## 2025-07-18 DIAGNOSIS — I87.2 CHRONIC VENOUS STASIS DERMATITIS OF BOTH LOWER EXTREMITIES: ICD-10-CM

## 2025-07-18 DIAGNOSIS — I25.10 CORONARY ARTERY DISEASE INVOLVING NATIVE CORONARY ARTERY OF NATIVE HEART WITHOUT ANGINA PECTORIS: ICD-10-CM

## 2025-07-18 DIAGNOSIS — I10 PRIMARY HYPERTENSION: ICD-10-CM

## 2025-07-18 DIAGNOSIS — R40.0 DAYTIME SOMNOLENCE: ICD-10-CM

## 2025-07-18 RX ORDER — LISINOPRIL 20 MG/1
20 TABLET ORAL DAILY
Qty: 90 TABLET | Refills: 3 | Status: SHIPPED | OUTPATIENT
Start: 2025-07-18

## 2025-07-22 RX ORDER — DILTIAZEM HYDROCHLORIDE 120 MG/1
120 CAPSULE, COATED, EXTENDED RELEASE ORAL
Qty: 90 CAPSULE | Refills: 3 | Status: SHIPPED | OUTPATIENT
Start: 2025-07-22

## 2025-07-23 LAB — NONINV COLON CA DNA+OCC BLD SCRN STL QL: NORMAL

## 2025-07-27 DIAGNOSIS — R53.82 CHRONIC FATIGUE: ICD-10-CM

## 2025-07-27 DIAGNOSIS — R07.89 OTHER CHEST PAIN: ICD-10-CM

## 2025-07-27 DIAGNOSIS — E66.01 MORBID OBESITY (MULTI): ICD-10-CM

## 2025-07-27 DIAGNOSIS — I10 PRIMARY HYPERTENSION: ICD-10-CM

## 2025-07-27 DIAGNOSIS — R40.0 DAYTIME SOMNOLENCE: ICD-10-CM

## 2025-07-27 DIAGNOSIS — R06.02 SOBOE (SHORTNESS OF BREATH ON EXERTION): ICD-10-CM

## 2025-07-27 DIAGNOSIS — I25.10 CORONARY ARTERY DISEASE INVOLVING NATIVE CORONARY ARTERY OF NATIVE HEART WITHOUT ANGINA PECTORIS: ICD-10-CM

## 2025-07-27 DIAGNOSIS — I50.32 CHRONIC HEART FAILURE WITH PRESERVED EJECTION FRACTION: ICD-10-CM

## 2025-07-27 DIAGNOSIS — I87.2 CHRONIC VENOUS STASIS DERMATITIS OF BOTH LOWER EXTREMITIES: ICD-10-CM

## 2025-07-27 DIAGNOSIS — G47.33 OSA (OBSTRUCTIVE SLEEP APNEA): ICD-10-CM

## 2025-07-28 ENCOUNTER — OFFICE VISIT (OUTPATIENT)
Dept: WOUND CARE | Facility: CLINIC | Age: 65
End: 2025-07-28
Payer: COMMERCIAL

## 2025-07-28 ENCOUNTER — APPOINTMENT (OUTPATIENT)
Dept: CARDIOLOGY | Facility: HOSPITAL | Age: 65
End: 2025-07-28
Payer: COMMERCIAL

## 2025-07-28 DIAGNOSIS — G47.33 OSA (OBSTRUCTIVE SLEEP APNEA): ICD-10-CM

## 2025-07-28 DIAGNOSIS — I50.32 CHRONIC HEART FAILURE WITH PRESERVED EJECTION FRACTION: Primary | ICD-10-CM

## 2025-07-28 DIAGNOSIS — I73.9 PERIPHERAL ARTERIAL DISEASE: ICD-10-CM

## 2025-07-28 DIAGNOSIS — I87.2 CHRONIC VENOUS STASIS DERMATITIS OF BOTH LOWER EXTREMITIES: ICD-10-CM

## 2025-07-28 DIAGNOSIS — I10 PRIMARY HYPERTENSION: ICD-10-CM

## 2025-07-28 DIAGNOSIS — E66.01 MORBID OBESITY (MULTI): ICD-10-CM

## 2025-07-28 DIAGNOSIS — I25.10 CORONARY ARTERY DISEASE INVOLVING NATIVE CORONARY ARTERY OF NATIVE HEART WITHOUT ANGINA PECTORIS: ICD-10-CM

## 2025-07-28 DIAGNOSIS — R53.82 CHRONIC FATIGUE: ICD-10-CM

## 2025-07-28 PROCEDURE — 11042 DBRDMT SUBQ TIS 1ST 20SQCM/<: CPT

## 2025-08-02 DIAGNOSIS — I25.118 CORONARY ARTERY DISEASE OF NATIVE ARTERY OF NATIVE HEART WITH STABLE ANGINA PECTORIS: ICD-10-CM

## 2025-08-02 DIAGNOSIS — I50.32 CHRONIC HEART FAILURE WITH PRESERVED EJECTION FRACTION: ICD-10-CM

## 2025-08-02 DIAGNOSIS — Z79.01 ANTICOAGULATED BY ANTICOAGULATION TREATMENT: ICD-10-CM

## 2025-08-04 RX ORDER — WARFARIN 1 MG/1
1 TABLET ORAL EVERY EVENING
Qty: 30 TABLET | Refills: 11 | Status: SHIPPED | OUTPATIENT
Start: 2025-08-04 | End: 2026-08-04

## 2025-08-06 DIAGNOSIS — Z12.12 SCREENING FOR COLORECTAL CANCER: ICD-10-CM

## 2025-08-06 DIAGNOSIS — Z12.11 SCREENING FOR COLORECTAL CANCER: ICD-10-CM

## 2025-08-08 DIAGNOSIS — I48.0 PAROXYSMAL ATRIAL FIBRILLATION (MULTI): ICD-10-CM

## 2025-08-11 ENCOUNTER — APPOINTMENT (OUTPATIENT)
Dept: PRIMARY CARE | Facility: CLINIC | Age: 65
End: 2025-08-11
Payer: COMMERCIAL

## 2025-08-11 VITALS
DIASTOLIC BLOOD PRESSURE: 72 MMHG | HEART RATE: 84 BPM | RESPIRATION RATE: 16 BRPM | OXYGEN SATURATION: 98 % | WEIGHT: 308 LBS | BODY MASS INDEX: 44.09 KG/M2 | SYSTOLIC BLOOD PRESSURE: 122 MMHG | HEIGHT: 70 IN

## 2025-08-11 DIAGNOSIS — I48.0 PAROXYSMAL ATRIAL FIBRILLATION (MULTI): ICD-10-CM

## 2025-08-11 DIAGNOSIS — B37.2 CUTANEOUS CANDIDIASIS: Primary | ICD-10-CM

## 2025-08-11 PROCEDURE — 3078F DIAST BP <80 MM HG: CPT | Performed by: FAMILY MEDICINE

## 2025-08-11 PROCEDURE — 3074F SYST BP LT 130 MM HG: CPT | Performed by: FAMILY MEDICINE

## 2025-08-11 PROCEDURE — 3008F BODY MASS INDEX DOCD: CPT | Performed by: FAMILY MEDICINE

## 2025-08-11 PROCEDURE — 99396 PREV VISIT EST AGE 40-64: CPT | Performed by: FAMILY MEDICINE

## 2025-08-11 PROCEDURE — 4010F ACE/ARB THERAPY RXD/TAKEN: CPT | Performed by: FAMILY MEDICINE

## 2025-08-11 RX ORDER — NYSTATIN 100000 U/G
CREAM TOPICAL 2 TIMES DAILY
Qty: 60 G | Refills: 3 | Status: SHIPPED | OUTPATIENT
Start: 2025-08-11

## 2025-08-11 RX ORDER — NYSTATIN 100000 U/G
CREAM TOPICAL 2 TIMES DAILY
COMMUNITY
End: 2025-08-11 | Stop reason: SDUPTHER

## 2025-08-11 ASSESSMENT — ENCOUNTER SYMPTOMS
FLANK PAIN: 1
OCCASIONAL FEELINGS OF UNSTEADINESS: 0
UNEXPECTED WEIGHT CHANGE: 1
DIFFICULTY URINATING: 1
DEPRESSION: 0
HEMATURIA: 1
LOSS OF SENSATION IN FEET: 0

## 2025-08-11 ASSESSMENT — ANXIETY QUESTIONNAIRES
1. FEELING NERVOUS, ANXIOUS, OR ON EDGE: NOT AT ALL
4. TROUBLE RELAXING: NOT AT ALL
7. FEELING AFRAID AS IF SOMETHING AWFUL MIGHT HAPPEN: NOT AT ALL
6. BECOMING EASILY ANNOYED OR IRRITABLE: NOT AT ALL
2. NOT BEING ABLE TO STOP OR CONTROL WORRYING: NOT AT ALL
5. BEING SO RESTLESS THAT IT IS HARD TO SIT STILL: NOT AT ALL
3. WORRYING TOO MUCH ABOUT DIFFERENT THINGS: NOT AT ALL
GAD7 TOTAL SCORE: 0
IF YOU CHECKED OFF ANY PROBLEMS ON THIS QUESTIONNAIRE, HOW DIFFICULT HAVE THESE PROBLEMS MADE IT FOR YOU TO DO YOUR WORK, TAKE CARE OF THINGS AT HOME, OR GET ALONG WITH OTHER PEOPLE: NOT DIFFICULT AT ALL

## 2025-08-25 ENCOUNTER — OFFICE VISIT (OUTPATIENT)
Dept: WOUND CARE | Facility: CLINIC | Age: 65
End: 2025-08-25
Payer: COMMERCIAL

## 2025-08-25 PROCEDURE — 11042 DBRDMT SUBQ TIS 1ST 20SQCM/<: CPT

## 2026-03-02 ENCOUNTER — APPOINTMENT (OUTPATIENT)
Dept: PRIMARY CARE | Facility: CLINIC | Age: 66
End: 2026-03-02
Payer: COMMERCIAL

## (undated) DEVICE — GUIDEWIRE, ULTRA TRACK, HYBRID, 0.035 IN X 150CM

## (undated) DEVICE — TUBING, SUCTION, CONNECTING, NON-CONDUCTIVE, SURE GRIP CONNECTORS, 3/16 IN X 10 FT

## (undated) DEVICE — DEVICE, INFLATION, PRESTO ID40ATM 30CC

## (undated) DEVICE — TR BAND, RADIAL COMPRESSION, LONG, 29CM

## (undated) DEVICE — APPLICATOR, CHLORAPREP, W/ORANGE TINT, 26ML

## (undated) DEVICE — IRRIGATION KIT, PUMPING, SINGLE ACTION, SYRINGE, 10 CC

## (undated) DEVICE — SET-UP PACK, BASIC, MAYO STAND COVER, SUTURE BAG, TABLE COVER, LF

## (undated) DEVICE — TOWEL PACK, STERILE, 4/PACK, BLUE

## (undated) DEVICE — COUNTER, FOAM STRIP & NEEDLE

## (undated) DEVICE — CATHETER, URETHRAL, FOLEY, 2 WAY, 18 FR, 5 CC, SILICONE

## (undated) DEVICE — DRAPE, SHEET, LAPAROTOMY, TRANSVERSE, W/FENESTRATION, 77 X 122 IN, DISPOSABLE, LF, STERILE

## (undated) DEVICE — BAG, DRAINAGE, ANTI-REFLUX CHAMBER, 2000ML

## (undated) DEVICE — BALLOON, ATLAS GOLD, 8F 16 X 4 X 80

## (undated) DEVICE — Device

## (undated) DEVICE — CATHETER, DUAL LUMEN, UDC/10/50 M

## (undated) DEVICE — SOLUTION, IRRIGATION, STERILE WATER, 1000 ML, POUR BOTTLE

## (undated) DEVICE — DRESSING, TRANSPARENT, TEGADERM, 4 X 4-3/4 IN

## (undated) DEVICE — DRAPE, SHEET, THREE QUARTER, FAN FOLD, 57 X 77 IN

## (undated) DEVICE — SOLUTION, INJECTION, CONTRAST, IOTHALAMATE MEGLUMINE 60%, CONRAY, 50 CC, VIAL

## (undated) DEVICE — SYRINGE, 20 CC, LUER LOCK

## (undated) DEVICE — DRAPE, SORBX, PERIPHERAL SHIELD, SINGLE LAYER

## (undated) DEVICE — SHEATH, URETERAL ACCESS, NAVIGATOR 12/14FR X 46CM

## (undated) DEVICE — SOLUTION, IRRIGATION, STERILE WATER, 1000 ML, HANG BOTTLE

## (undated) DEVICE — CATHETER, VISIONS PV 8.2 (IVUS)

## (undated) DEVICE — LABELING SYSTEM, CORRECT MEDICATION, CATH LAB

## (undated) DEVICE — SYRINGE, 5 CC, LUER LOCK

## (undated) DEVICE — COLLECTION BAG, FLUID, NON-STERILE

## (undated) DEVICE — PREP TRAY, SKIN, DRY, W/GLOVES

## (undated) DEVICE — GUIDEWIRE, ULTRA TRACK, HYBRID, 0.038 IN STRAIGHT TIP

## (undated) DEVICE — GUIDEWIRE, INQWIRE, 3MM J, .035 X 210CM, FIXED

## (undated) DEVICE — MARKER, SKIN, DUAL TIP INK W/9 LABEL AND REMOVABLE TIME OUT SLEEVE

## (undated) DEVICE — CATHETER, DIAGNOSTIC, DXTERITY, 6FR JR 4.0, 100CM

## (undated) DEVICE — SPONGE, GAUZE, XRAY DECT, 16 PLY, 4 X 4, W/MASTER WDMT,STERILE

## (undated) DEVICE — CATHETER, OPTITORQUE, 6FR 100CM, TIGER RADIAL 4.0

## (undated) DEVICE — SYRINGE, 30 CC, LUER LOCK

## (undated) DEVICE — SOLUTION, IRRIGATION, STERILE WATER, USP, 3000ML, TITAN XL

## (undated) DEVICE — IRRIGATION SET, CYSTOSCOPY, TURP, Y, CONTINUOUS, 81 IN

## (undated) DEVICE — WIPE, INSTRUMENT, POLYVINYL ALCOHOL, 2 1/2 X 2 1/2

## (undated) DEVICE — DRESSING, GAUZE, SPONGE, VERSALON, ALL PURPOSE, 4 X 4 IN, SOFT

## (undated) DEVICE — CATHETER, URETERAL, POLLACK, OPEN END, 5.5 FR, 70 CM

## (undated) DEVICE — GLOVE, RADIATION, ATTENUATION, SIZE-7.5, PF

## (undated) DEVICE — GUIDEWIRE, ANGLE TIP,  .035 DIA, 180 CM, 3 CM TIP"

## (undated) DEVICE — INTRODUCER, SHEATH, AVANTI PLUS, W/MINI GUIDEWIRE, 9 FR X 11 CM

## (undated) DEVICE — SHEATH, GLIDESHEATH, SLENDER, 6FR 10CM

## (undated) DEVICE — GOWN, ASTOUND, XL

## (undated) DEVICE — SOLUTION, IRRIGATION, USP, 0.9% SODIUM CHL, 3000ML, TITAN XL

## (undated) DEVICE — DRAPE, X-RAY, CASSETTE, LARGE

## (undated) DEVICE — DRAPE, C-ARM IMAGE

## (undated) DEVICE — STOPCOCK, SAN ANTONIO, W/MODIFIED FITTING

## (undated) DEVICE — BAG, PRESSURE INFUSER, 3000 CC, LF

## (undated) DEVICE — GUIDEWIRE, STRAIGHT, AMPLATZ SUPER STIFF, 0.035 IN X 180 CM